# Patient Record
Sex: FEMALE | Race: WHITE | Employment: OTHER | URBAN - METROPOLITAN AREA
[De-identification: names, ages, dates, MRNs, and addresses within clinical notes are randomized per-mention and may not be internally consistent; named-entity substitution may affect disease eponyms.]

---

## 2017-02-27 ENCOUNTER — OFFICE VISIT (OUTPATIENT)
Dept: FAMILY MEDICINE CLINIC | Age: 57
End: 2017-02-27

## 2017-02-27 VITALS
HEART RATE: 74 BPM | BODY MASS INDEX: 32.24 KG/M2 | DIASTOLIC BLOOD PRESSURE: 85 MMHG | SYSTOLIC BLOOD PRESSURE: 138 MMHG | RESPIRATION RATE: 14 BRPM | HEIGHT: 62 IN | TEMPERATURE: 98.1 F | WEIGHT: 175.2 LBS

## 2017-02-27 DIAGNOSIS — E11.9 TYPE 2 DIABETES MELLITUS WITHOUT COMPLICATION, WITHOUT LONG-TERM CURRENT USE OF INSULIN (HCC): Primary | ICD-10-CM

## 2017-02-27 DIAGNOSIS — F17.208 NICOTINE DEPENDENCE WITH OTHER NICOTINE-INDUCED DISORDER, UNSPECIFIED NICOTINE PRODUCT TYPE: ICD-10-CM

## 2017-02-27 DIAGNOSIS — Z71.6 ENCOUNTER FOR SMOKING CESSATION COUNSELING: ICD-10-CM

## 2017-02-27 DIAGNOSIS — R11.0 NAUSEA: ICD-10-CM

## 2017-02-27 DIAGNOSIS — J44.9 COPD, SEVERE (HCC): ICD-10-CM

## 2017-02-27 LAB — HBA1C MFR BLD: 5.7 %

## 2017-02-27 PROCEDURE — 83036 HEMOGLOBIN GLYCOSYLATED A1C: CPT | Performed by: NURSE PRACTITIONER

## 2017-02-27 PROCEDURE — 99214 OFFICE O/P EST MOD 30 MIN: CPT | Performed by: NURSE PRACTITIONER

## 2017-02-27 RX ORDER — GABAPENTIN 300 MG/1
300 CAPSULE ORAL NIGHTLY PRN
Qty: 90 CAPSULE | Refills: 0 | Status: SHIPPED | OUTPATIENT
Start: 2017-02-27 | End: 2017-05-31 | Stop reason: SDUPTHER

## 2017-02-27 RX ORDER — ONDANSETRON 4 MG/1
4 TABLET, ORALLY DISINTEGRATING ORAL EVERY 8 HOURS PRN
Qty: 90 TABLET | Refills: 1 | Status: SHIPPED | OUTPATIENT
Start: 2017-02-27 | End: 2018-02-06 | Stop reason: SDUPTHER

## 2017-02-27 RX ORDER — VARENICLINE TARTRATE 25 MG
KIT ORAL
Qty: 53 TABLET | Refills: 0 | Status: SHIPPED | OUTPATIENT
Start: 2017-02-27 | End: 2017-05-25

## 2017-02-27 ASSESSMENT — ENCOUNTER SYMPTOMS
ABDOMINAL DISTENTION: 0
VOMITING: 0
TROUBLE SWALLOWING: 0
EYE DISCHARGE: 0
WHEEZING: 1
COUGH: 1
PHOTOPHOBIA: 0
DIARRHEA: 0
CONSTIPATION: 0
NAUSEA: 0
EYE PAIN: 0
ABDOMINAL PAIN: 0
SORE THROAT: 0
BLOOD IN STOOL: 0
SHORTNESS OF BREATH: 1
BACK PAIN: 1

## 2017-03-07 ENCOUNTER — OFFICE VISIT (OUTPATIENT)
Dept: CARDIOLOGY | Age: 57
End: 2017-03-07

## 2017-03-07 VITALS
DIASTOLIC BLOOD PRESSURE: 64 MMHG | BODY MASS INDEX: 32.02 KG/M2 | WEIGHT: 174 LBS | HEART RATE: 72 BPM | HEIGHT: 62 IN | SYSTOLIC BLOOD PRESSURE: 124 MMHG

## 2017-03-07 DIAGNOSIS — I10 ESSENTIAL HYPERTENSION: ICD-10-CM

## 2017-03-07 DIAGNOSIS — E78.00 PURE HYPERCHOLESTEROLEMIA: ICD-10-CM

## 2017-03-07 DIAGNOSIS — Z01.810 PREOP CARDIOVASCULAR EXAM: Primary | ICD-10-CM

## 2017-03-07 PROCEDURE — 99213 OFFICE O/P EST LOW 20 MIN: CPT | Performed by: INTERNAL MEDICINE

## 2017-03-14 ENCOUNTER — TELEPHONE (OUTPATIENT)
Dept: FAMILY MEDICINE CLINIC | Age: 57
End: 2017-03-14

## 2017-03-15 ENCOUNTER — OFFICE VISIT (OUTPATIENT)
Dept: FAMILY MEDICINE CLINIC | Age: 57
End: 2017-03-15

## 2017-03-15 VITALS
DIASTOLIC BLOOD PRESSURE: 78 MMHG | HEART RATE: 73 BPM | TEMPERATURE: 97.8 F | BODY MASS INDEX: 30.62 KG/M2 | WEIGHT: 172.8 LBS | SYSTOLIC BLOOD PRESSURE: 132 MMHG | HEIGHT: 63 IN | RESPIRATION RATE: 12 BRPM

## 2017-03-15 DIAGNOSIS — K57.92 DIVERTICULITIS OF INTESTINE WITHOUT PERFORATION OR ABSCESS WITHOUT BLEEDING, UNSPECIFIED PART OF INTESTINAL TRACT: Primary | ICD-10-CM

## 2017-03-15 PROCEDURE — 99214 OFFICE O/P EST MOD 30 MIN: CPT | Performed by: NURSE PRACTITIONER

## 2017-03-15 RX ORDER — FLUCONAZOLE 200 MG/1
TABLET ORAL
Qty: 2 TABLET | Refills: 0 | Status: SHIPPED | OUTPATIENT
Start: 2017-03-15 | End: 2017-05-25

## 2017-03-15 RX ORDER — METRONIDAZOLE 500 MG/1
500 TABLET ORAL 3 TIMES DAILY
Qty: 30 TABLET | Refills: 0 | Status: SHIPPED | OUTPATIENT
Start: 2017-03-15 | End: 2017-03-25

## 2017-03-15 RX ORDER — CIPROFLOXACIN 500 MG/1
500 TABLET, FILM COATED ORAL 2 TIMES DAILY
Qty: 20 TABLET | Refills: 0 | Status: SHIPPED | OUTPATIENT
Start: 2017-03-15 | End: 2017-03-25

## 2017-03-15 ASSESSMENT — ENCOUNTER SYMPTOMS
EYE PAIN: 0
COUGH: 0
CONSTIPATION: 0
ABDOMINAL DISTENTION: 0
ABDOMINAL PAIN: 1
DIARRHEA: 1
SORE THROAT: 0
RECTAL PAIN: 0
VOMITING: 0
EYE DISCHARGE: 0
SHORTNESS OF BREATH: 0
TROUBLE SWALLOWING: 0
NAUSEA: 0
BLOOD IN STOOL: 0
PHOTOPHOBIA: 0

## 2017-04-07 ENCOUNTER — OFFICE VISIT (OUTPATIENT)
Dept: ENDOCRINOLOGY | Age: 57
End: 2017-04-07

## 2017-04-07 VITALS
WEIGHT: 175 LBS | BODY MASS INDEX: 31.01 KG/M2 | SYSTOLIC BLOOD PRESSURE: 123 MMHG | DIASTOLIC BLOOD PRESSURE: 74 MMHG | HEART RATE: 86 BPM | HEIGHT: 63 IN | RESPIRATION RATE: 22 BRPM

## 2017-04-07 DIAGNOSIS — E78.00 PURE HYPERCHOLESTEROLEMIA: ICD-10-CM

## 2017-04-07 DIAGNOSIS — E11.9 TYPE 2 DIABETES MELLITUS WITHOUT COMPLICATION, WITHOUT LONG-TERM CURRENT USE OF INSULIN (HCC): ICD-10-CM

## 2017-04-07 DIAGNOSIS — Z86.39 H/O HYPERTHYROIDISM: Primary | ICD-10-CM

## 2017-04-07 DIAGNOSIS — R10.84 GENERALIZED ABDOMINAL PAIN: ICD-10-CM

## 2017-04-07 PROCEDURE — 99214 OFFICE O/P EST MOD 30 MIN: CPT | Performed by: INTERNAL MEDICINE

## 2017-04-12 LAB
ALBUMIN SERPL-MCNC: 4.5 G/DL (ref 3.2–5.3)
ALK PHOSPHATASE: 128 IU/L (ref 35–121)
ALT SERPL-CCNC: 21 IU/L (ref 5–59)
AMYLASE: 34 IU/L (ref 35–120)
AST SERPL-CCNC: 18 IU/L (ref 10–42)
BILIRUB SERPL-MCNC: 0.5 MG/DL (ref 0.2–1.3)
BILIRUBIN DIRECT: 0.1 MG/DL (ref 0–0.2)
CHOLESTEROL/HDL RATIO: 3.8
CHOLESTEROL: 218 MG/DL
CREATINE, URINE: 90 MG/DL
HDLC SERPL-MCNC: 58 MG/DL (ref 40–60)
LDL CHOLESTEROL CALCULATED: 130 MG/DL
LDL/HDL RATIO: 2.2
LIPASE: 24 IU/L (ref 11–82)
MICROALBUMIN/CREAT 24H UR: 1.1 MG/DL
MICROALBUMIN/CREAT UR-RTO: 12 UG/MG
T4 FREE: 0.95 NG/DL (ref 0.8–1.8)
TOTAL PROTEIN: 6.9 G/DL (ref 5.8–8)
TRIGL SERPL-MCNC: 148 MG/DL
TSH SERPL DL<=0.05 MIU/L-ACNC: 2.89 UIU/ML (ref 0.4–4.4)
VLDLC SERPL CALC-MCNC: 30 MG/DL

## 2017-05-10 ENCOUNTER — TELEPHONE (OUTPATIENT)
Dept: CARDIOLOGY | Age: 57
End: 2017-05-10

## 2017-05-11 DIAGNOSIS — R06.02 SHORTNESS OF BREATH: ICD-10-CM

## 2017-05-11 RX ORDER — ALBUTEROL SULFATE 90 UG/1
2 AEROSOL, METERED RESPIRATORY (INHALATION) EVERY 6 HOURS PRN
Qty: 1 INHALER | Refills: 3 | Status: SHIPPED | OUTPATIENT
Start: 2017-05-11 | End: 2017-05-25 | Stop reason: SDUPTHER

## 2017-05-25 ENCOUNTER — OFFICE VISIT (OUTPATIENT)
Dept: PULMONOLOGY | Age: 57
End: 2017-05-25

## 2017-05-25 VITALS
HEART RATE: 83 BPM | OXYGEN SATURATION: 94 % | DIASTOLIC BLOOD PRESSURE: 78 MMHG | TEMPERATURE: 98.3 F | WEIGHT: 170.6 LBS | SYSTOLIC BLOOD PRESSURE: 117 MMHG | HEIGHT: 62 IN | BODY MASS INDEX: 31.39 KG/M2

## 2017-05-25 DIAGNOSIS — R06.02 SHORTNESS OF BREATH: ICD-10-CM

## 2017-05-25 PROCEDURE — 99213 OFFICE O/P EST LOW 20 MIN: CPT | Performed by: INTERNAL MEDICINE

## 2017-05-25 RX ORDER — ALBUTEROL SULFATE 90 UG/1
2 AEROSOL, METERED RESPIRATORY (INHALATION) EVERY 6 HOURS PRN
Qty: 1 INHALER | Refills: 11 | Status: SHIPPED | OUTPATIENT
Start: 2017-05-25 | End: 2018-02-06 | Stop reason: SDUPTHER

## 2017-05-31 ENCOUNTER — OFFICE VISIT (OUTPATIENT)
Dept: FAMILY MEDICINE CLINIC | Age: 57
End: 2017-05-31

## 2017-05-31 VITALS
HEART RATE: 84 BPM | SYSTOLIC BLOOD PRESSURE: 118 MMHG | TEMPERATURE: 97.9 F | WEIGHT: 168.5 LBS | DIASTOLIC BLOOD PRESSURE: 76 MMHG | HEIGHT: 63 IN | BODY MASS INDEX: 29.86 KG/M2 | RESPIRATION RATE: 10 BRPM

## 2017-05-31 DIAGNOSIS — I10 ESSENTIAL HYPERTENSION: ICD-10-CM

## 2017-05-31 DIAGNOSIS — F32.1 MODERATE SINGLE CURRENT EPISODE OF MAJOR DEPRESSIVE DISORDER (HCC): ICD-10-CM

## 2017-05-31 DIAGNOSIS — E11.9 WELL CONTROLLED TYPE 2 DIABETES MELLITUS (HCC): Primary | ICD-10-CM

## 2017-05-31 DIAGNOSIS — E78.00 HYPERCHOLESTEROLEMIA: ICD-10-CM

## 2017-05-31 LAB — HBA1C MFR BLD: 5.9 %

## 2017-05-31 PROCEDURE — 83036 HEMOGLOBIN GLYCOSYLATED A1C: CPT | Performed by: FAMILY MEDICINE

## 2017-05-31 PROCEDURE — 99214 OFFICE O/P EST MOD 30 MIN: CPT | Performed by: FAMILY MEDICINE

## 2017-05-31 RX ORDER — AMLODIPINE BESYLATE 10 MG/1
10 TABLET ORAL DAILY
Qty: 30 TABLET | Refills: 5 | Status: SHIPPED | OUTPATIENT
Start: 2017-05-31 | End: 2017-12-13 | Stop reason: SDUPTHER

## 2017-05-31 RX ORDER — DULOXETIN HYDROCHLORIDE 30 MG/1
CAPSULE, DELAYED RELEASE ORAL
Qty: 90 CAPSULE | Refills: 5 | Status: SHIPPED | OUTPATIENT
Start: 2017-05-31 | End: 2018-02-06 | Stop reason: SDUPTHER

## 2017-05-31 RX ORDER — DULOXETIN HYDROCHLORIDE 60 MG/1
60 CAPSULE, DELAYED RELEASE ORAL DAILY
Qty: 30 CAPSULE | Refills: 5 | Status: CANCELLED | OUTPATIENT
Start: 2017-05-31

## 2017-05-31 RX ORDER — ARIPIPRAZOLE 2 MG/1
2 TABLET ORAL DAILY
Qty: 30 TABLET | Refills: 5 | Status: SHIPPED | OUTPATIENT
Start: 2017-05-31 | End: 2018-02-09 | Stop reason: SDUPTHER

## 2017-05-31 RX ORDER — CARVEDILOL 25 MG/1
25 TABLET ORAL 2 TIMES DAILY WITH MEALS
Qty: 60 TABLET | Refills: 5 | Status: SHIPPED | OUTPATIENT
Start: 2017-05-31 | End: 2018-02-06 | Stop reason: SDUPTHER

## 2017-05-31 RX ORDER — GLUCOSAMINE HCL/CHONDROITIN SU 500-400 MG
CAPSULE ORAL
Qty: 60 STRIP | Refills: 5 | Status: SHIPPED | OUTPATIENT
Start: 2017-05-31 | End: 2018-08-10 | Stop reason: CLARIF

## 2017-05-31 RX ORDER — GABAPENTIN 300 MG/1
300 CAPSULE ORAL NIGHTLY PRN
Qty: 90 CAPSULE | Refills: 5 | Status: SHIPPED | OUTPATIENT
Start: 2017-05-31 | End: 2018-02-06 | Stop reason: SDUPTHER

## 2017-05-31 RX ORDER — UBIDECARENONE 200 MG
1 CAPSULE ORAL DAILY
Qty: 30 CAPSULE | Refills: 5 | Status: SHIPPED | OUTPATIENT
Start: 2017-05-31 | End: 2017-07-11 | Stop reason: ALTCHOICE

## 2017-05-31 RX ORDER — LANCETS 30 GAUGE
EACH MISCELLANEOUS
Qty: 100 EACH | Refills: 5 | Status: SHIPPED | OUTPATIENT
Start: 2017-05-31 | End: 2018-08-10 | Stop reason: CLARIF

## 2017-05-31 ASSESSMENT — PATIENT HEALTH QUESTIONNAIRE - PHQ9
SUM OF ALL RESPONSES TO PHQ QUESTIONS 1-9: 1
1. LITTLE INTEREST OR PLEASURE IN DOING THINGS: 0
2. FEELING DOWN, DEPRESSED OR HOPELESS: 1
SUM OF ALL RESPONSES TO PHQ9 QUESTIONS 1 & 2: 1

## 2017-07-11 ENCOUNTER — OFFICE VISIT (OUTPATIENT)
Dept: CARDIOLOGY | Age: 57
End: 2017-07-11

## 2017-07-11 VITALS
HEIGHT: 62 IN | DIASTOLIC BLOOD PRESSURE: 68 MMHG | SYSTOLIC BLOOD PRESSURE: 132 MMHG | WEIGHT: 171.6 LBS | HEART RATE: 88 BPM | BODY MASS INDEX: 31.58 KG/M2

## 2017-07-11 DIAGNOSIS — R07.82 INTERCOSTAL PAIN: Primary | ICD-10-CM

## 2017-07-11 PROCEDURE — 99213 OFFICE O/P EST LOW 20 MIN: CPT | Performed by: INTERNAL MEDICINE

## 2017-07-24 ENCOUNTER — HOSPITAL ENCOUNTER (OUTPATIENT)
Dept: NON INVASIVE DIAGNOSTICS | Age: 57
Discharge: HOME | End: 2017-07-24
Payer: COMMERCIAL

## 2017-07-24 DIAGNOSIS — I10 ESSENTIAL HYPERTENSION: ICD-10-CM

## 2017-07-24 DIAGNOSIS — R06.02 SHORTNESS OF BREATH: ICD-10-CM

## 2017-07-24 DIAGNOSIS — E78.00 HYPERCHOLESTEROLEMIA: ICD-10-CM

## 2017-07-24 DIAGNOSIS — R07.82 INTERCOSTAL PAIN: ICD-10-CM

## 2017-07-24 DIAGNOSIS — Z01.818 PRE-OP EVALUATION: ICD-10-CM

## 2017-07-24 LAB
LV EF: 65 %
LV EF: 65 %
LVEF MODALITY: NORMAL
LVEF MODALITY: NORMAL

## 2017-07-24 PROCEDURE — 93017 CV STRESS TEST TRACING ONLY: CPT | Performed by: INTERNAL MEDICINE

## 2017-07-24 PROCEDURE — 93306 TTE W/DOPPLER COMPLETE: CPT

## 2017-07-24 PROCEDURE — 6360000002 HC RX W HCPCS

## 2017-07-24 PROCEDURE — A9500 TC99M SESTAMIBI: HCPCS | Performed by: INTERNAL MEDICINE

## 2017-07-24 PROCEDURE — 78452 HT MUSCLE IMAGE SPECT MULT: CPT

## 2017-07-24 PROCEDURE — 3430000000 HC RX DIAGNOSTIC RADIOPHARMACEUTICAL: Performed by: INTERNAL MEDICINE

## 2017-07-24 RX ADMIN — Medication 34.1 MILLICURIE: at 12:45

## 2017-07-24 RX ADMIN — Medication 9.4 MILLICURIE: at 11:50

## 2017-07-31 ENCOUNTER — OFFICE VISIT (OUTPATIENT)
Dept: FAMILY MEDICINE CLINIC | Age: 57
End: 2017-07-31
Payer: COMMERCIAL

## 2017-07-31 VITALS
HEART RATE: 76 BPM | BODY MASS INDEX: 31.65 KG/M2 | DIASTOLIC BLOOD PRESSURE: 76 MMHG | TEMPERATURE: 98.9 F | HEIGHT: 62 IN | SYSTOLIC BLOOD PRESSURE: 130 MMHG | RESPIRATION RATE: 16 BRPM | WEIGHT: 172 LBS

## 2017-07-31 DIAGNOSIS — Z00.00 WELL ADULT EXAM: Primary | ICD-10-CM

## 2017-07-31 PROCEDURE — 99396 PREV VISIT EST AGE 40-64: CPT | Performed by: FAMILY MEDICINE

## 2017-07-31 ASSESSMENT — ENCOUNTER SYMPTOMS
WHEEZING: 0
SORE THROAT: 0
CHOKING: 0
APNEA: 0
FACIAL SWELLING: 0
ANAL BLEEDING: 0
COUGH: 0
NAUSEA: 0
RHINORRHEA: 0
ABDOMINAL DISTENTION: 0
EYE ITCHING: 0
EYE DISCHARGE: 0
BLOOD IN STOOL: 0
EYE REDNESS: 0
TROUBLE SWALLOWING: 0
CHEST TIGHTNESS: 0
PHOTOPHOBIA: 0
CONSTIPATION: 0
VOMITING: 0
DIARRHEA: 0
ABDOMINAL PAIN: 0
STRIDOR: 0
SINUS PRESSURE: 0
SHORTNESS OF BREATH: 0
BACK PAIN: 0
COLOR CHANGE: 0
EYE PAIN: 0
RECTAL PAIN: 0
VOICE CHANGE: 0

## 2017-08-03 ENCOUNTER — OFFICE VISIT (OUTPATIENT)
Dept: ENDOCRINOLOGY | Age: 57
End: 2017-08-03
Payer: COMMERCIAL

## 2017-08-03 VITALS
WEIGHT: 175.5 LBS | SYSTOLIC BLOOD PRESSURE: 121 MMHG | RESPIRATION RATE: 18 BRPM | DIASTOLIC BLOOD PRESSURE: 78 MMHG | HEART RATE: 83 BPM | HEIGHT: 62 IN | BODY MASS INDEX: 32.3 KG/M2

## 2017-08-03 DIAGNOSIS — I10 ESSENTIAL HYPERTENSION: ICD-10-CM

## 2017-08-03 DIAGNOSIS — E78.00 PURE HYPERCHOLESTEROLEMIA: ICD-10-CM

## 2017-08-03 DIAGNOSIS — R53.83 FATIGUE, UNSPECIFIED TYPE: ICD-10-CM

## 2017-08-03 DIAGNOSIS — E11.9 TYPE 2 DIABETES MELLITUS WITHOUT COMPLICATION, WITHOUT LONG-TERM CURRENT USE OF INSULIN (HCC): Primary | ICD-10-CM

## 2017-08-03 PROCEDURE — 99214 OFFICE O/P EST MOD 30 MIN: CPT | Performed by: INTERNAL MEDICINE

## 2017-08-03 RX ORDER — EZETIMIBE 10 MG/1
10 TABLET ORAL DAILY
Qty: 30 TABLET | Refills: 3 | Status: SHIPPED | OUTPATIENT
Start: 2017-08-03 | End: 2018-08-10 | Stop reason: CLARIF

## 2017-08-04 LAB
T4 FREE: 1.1 NG/DL (ref 0.8–1.8)
TSH SERPL DL<=0.05 MIU/L-ACNC: 0.82 UIU/ML (ref 0.4–4.4)

## 2017-08-07 ENCOUNTER — TELEPHONE (OUTPATIENT)
Dept: ENDOCRINOLOGY | Age: 57
End: 2017-08-07

## 2017-08-07 RX ORDER — ROSUVASTATIN CALCIUM 10 MG/1
TABLET, COATED ORAL
Qty: 30 TABLET | Refills: 5 | Status: SHIPPED | OUTPATIENT
Start: 2017-08-07 | End: 2018-01-31

## 2017-08-07 RX ORDER — DULOXETIN HYDROCHLORIDE 60 MG/1
CAPSULE, DELAYED RELEASE ORAL
Qty: 30 CAPSULE | Refills: 5 | Status: SHIPPED | OUTPATIENT
Start: 2017-08-07 | End: 2018-01-31

## 2017-09-07 ENCOUNTER — TELEPHONE (OUTPATIENT)
Dept: FAMILY MEDICINE CLINIC | Age: 57
End: 2017-09-07

## 2017-10-04 LAB
ALBUMIN SERPL-MCNC: 4.4 G/DL (ref 3.2–5.3)
ALK PHOSPHATASE: 121 IU/L (ref 35–121)
ALT SERPL-CCNC: 17 IU/L (ref 5–59)
AST SERPL-CCNC: 16 IU/L (ref 10–42)
BILIRUB SERPL-MCNC: 0.5 MG/DL (ref 0.2–1.3)
BILIRUBIN DIRECT: 0.1 MG/DL (ref 0–0.2)
CHOLESTEROL/HDL RATIO: 3.9
CHOLESTEROL: 205 MG/DL
HDLC SERPL-MCNC: 52 MG/DL (ref 40–60)
LDL CHOLESTEROL CALCULATED: 115 MG/DL
LDL/HDL RATIO: 2.2
TOTAL PROTEIN: 6.8 G/DL (ref 5.8–8)
TRIGL SERPL-MCNC: 191 MG/DL
VLDLC SERPL CALC-MCNC: 38 MG/DL

## 2017-10-05 ENCOUNTER — HOSPITAL ENCOUNTER (OUTPATIENT)
Dept: CT IMAGING | Age: 57
Discharge: HOME | End: 2017-10-05
Payer: COMMERCIAL

## 2017-10-05 DIAGNOSIS — R10.32 LLQ PAIN: ICD-10-CM

## 2017-10-05 DIAGNOSIS — K57.93 DIVERTICULITIS OF INTESTINE WITHOUT PERFORATION OR ABSCESS WITH BLEEDING, UNSPECIFIED PART OF INTESTINAL TRACT: ICD-10-CM

## 2017-10-05 LAB — POC CREATININE WHOLE BLOOD: 0.7 MG/DL (ref 0.5–1.2)

## 2017-10-05 PROCEDURE — 6360000004 HC RX CONTRAST MEDICATION: Performed by: INTERNAL MEDICINE

## 2017-10-05 PROCEDURE — 82565 ASSAY OF CREATININE: CPT

## 2017-10-05 PROCEDURE — 74177 CT ABD & PELVIS W/CONTRAST: CPT

## 2017-10-05 RX ADMIN — IOPAMIDOL 85 ML: 755 INJECTION, SOLUTION INTRAVENOUS at 10:50

## 2017-10-05 RX ADMIN — IOHEXOL 50 ML: 240 INJECTION, SOLUTION INTRATHECAL; INTRAVASCULAR; INTRAVENOUS; ORAL at 10:00

## 2017-10-25 ENCOUNTER — TELEPHONE (OUTPATIENT)
Dept: FAMILY MEDICINE CLINIC | Age: 57
End: 2017-10-25

## 2017-10-25 LAB — C DIFFICILE TOXIN, EIA: NORMAL

## 2017-11-07 ENCOUNTER — OFFICE VISIT (OUTPATIENT)
Dept: ENDOCRINOLOGY | Age: 57
End: 2017-11-07
Payer: MEDICARE

## 2017-11-07 ENCOUNTER — TELEPHONE (OUTPATIENT)
Dept: FAMILY MEDICINE CLINIC | Age: 57
End: 2017-11-07

## 2017-11-07 VITALS
DIASTOLIC BLOOD PRESSURE: 100 MMHG | WEIGHT: 178.5 LBS | SYSTOLIC BLOOD PRESSURE: 152 MMHG | BODY MASS INDEX: 32.85 KG/M2 | RESPIRATION RATE: 16 BRPM | HEART RATE: 80 BPM | HEIGHT: 62 IN

## 2017-11-07 DIAGNOSIS — E11.9 TYPE 2 DIABETES MELLITUS WITHOUT COMPLICATION, WITHOUT LONG-TERM CURRENT USE OF INSULIN (HCC): Primary | ICD-10-CM

## 2017-11-07 DIAGNOSIS — E78.2 MIXED HYPERLIPIDEMIA: ICD-10-CM

## 2017-11-07 DIAGNOSIS — E05.00 GRAVES DISEASE: ICD-10-CM

## 2017-11-07 PROCEDURE — G8417 CALC BMI ABV UP PARAM F/U: HCPCS | Performed by: CLINICAL NURSE SPECIALIST

## 2017-11-07 PROCEDURE — 3044F HG A1C LEVEL LT 7.0%: CPT | Performed by: CLINICAL NURSE SPECIALIST

## 2017-11-07 PROCEDURE — G8484 FLU IMMUNIZE NO ADMIN: HCPCS | Performed by: CLINICAL NURSE SPECIALIST

## 2017-11-07 PROCEDURE — G8427 DOCREV CUR MEDS BY ELIG CLIN: HCPCS | Performed by: CLINICAL NURSE SPECIALIST

## 2017-11-07 PROCEDURE — 3017F COLORECTAL CA SCREEN DOC REV: CPT | Performed by: CLINICAL NURSE SPECIALIST

## 2017-11-07 PROCEDURE — 3014F SCREEN MAMMO DOC REV: CPT | Performed by: CLINICAL NURSE SPECIALIST

## 2017-11-07 PROCEDURE — 99214 OFFICE O/P EST MOD 30 MIN: CPT | Performed by: CLINICAL NURSE SPECIALIST

## 2017-11-07 PROCEDURE — 4004F PT TOBACCO SCREEN RCVD TLK: CPT | Performed by: CLINICAL NURSE SPECIALIST

## 2017-11-07 ASSESSMENT — ENCOUNTER SYMPTOMS
BACK PAIN: 1
NAUSEA: 0
EYE REDNESS: 0
DIARRHEA: 0
WHEEZING: 0
COUGH: 0
CHEST TIGHTNESS: 0
SHORTNESS OF BREATH: 0
CONSTIPATION: 0
ABDOMINAL PAIN: 0
VOICE CHANGE: 0

## 2017-11-07 NOTE — PROGRESS NOTES
reactive to light. Right eye exhibits no discharge. Left eye exhibits no discharge. No scleral icterus. Neck: Normal range of motion. Neck supple. No thyromegaly present. Cardiovascular: Normal rate, regular rhythm, normal heart sounds and intact distal pulses. No murmur heard. Pulmonary/Chest: Effort normal and breath sounds normal. No stridor. No respiratory distress. She has no wheezes. She has no rales. Abdominal: Soft. Bowel sounds are normal. There is no tenderness. Musculoskeletal: Normal range of motion. She exhibits no edema or tenderness. Lymphadenopathy:     She has no cervical adenopathy. Neurological: She is alert and oriented to person, place, and time. No cranial nerve deficit. Coordination normal.   Skin: Skin is warm and dry. She is not diaphoretic. Psychiatric: She has a normal mood and affect. Her behavior is normal. Judgment and thought content normal.     Assessment:      BP (!) 152/100 (Site: Right Arm, Position: Sitting, Cuff Size: Medium Adult) Comment: MANUAL RECHECK  Pulse 80   Resp 16   Ht 5' 1.81\" (1.57 m)   Wt 178 lb 8 oz (81 kg)   BMI 32.85 kg/m²   1. Type 2 diabetes mellitus without complication, without long-term current use of insulin (HCC) - BS in goals & tolerating DPP-4 without adverse effects - to continue. Discussed glycemic goal, monitoring, therapeutic lifestyle for weight loss  4/2017 malb/creat 12  1/2017 BUN 11, creat 0.6, GFR > 90     2. Mixed hyperlipidemia - treated & tolerating - crestor 10 mg daily, zetai 10 mg daily - 10/2017 chol 205, trig 191, HDL 52, , liver panel normal - patient did not want to increase due to risk of muscle aches. Repeat levels on follow up     3. Graves disease - in remission 8/2017 TSH 0.8, FT4 1. 10. Will recheck thyroid function on follow up     4.  Hypertension - above goal on medication - asymptomatic - staff to contact patient's PCP - reviewed goals; advised patient to monitor at home and report to PCP    Greater than 50% of visit discussing glycemic management, diagnoses as above, reviewing/ordering labs, changing/addressing medication, answering questions    Plan:      Continue Januvia 100 mg daily  Check blood sugar once daily alternate fasting with two hours after a meal    Labs before next visit      Return in about 3 months (around 2/7/2018).        Electronically signed by FRANCISCO Valentine on 11/7/2017 at 12:45 PM

## 2017-11-09 ENCOUNTER — OFFICE VISIT (OUTPATIENT)
Dept: CARDIOLOGY CLINIC | Age: 57
End: 2017-11-09
Payer: MEDICARE

## 2017-11-09 VITALS
HEART RATE: 80 BPM | WEIGHT: 180.2 LBS | HEIGHT: 62 IN | SYSTOLIC BLOOD PRESSURE: 136 MMHG | DIASTOLIC BLOOD PRESSURE: 78 MMHG | BODY MASS INDEX: 33.16 KG/M2

## 2017-11-09 DIAGNOSIS — E78.00 PURE HYPERCHOLESTEROLEMIA: Primary | ICD-10-CM

## 2017-11-09 DIAGNOSIS — R07.82 INTERCOSTAL PAIN: ICD-10-CM

## 2017-11-09 PROCEDURE — G8427 DOCREV CUR MEDS BY ELIG CLIN: HCPCS | Performed by: INTERNAL MEDICINE

## 2017-11-09 PROCEDURE — 3014F SCREEN MAMMO DOC REV: CPT | Performed by: INTERNAL MEDICINE

## 2017-11-09 PROCEDURE — 99213 OFFICE O/P EST LOW 20 MIN: CPT | Performed by: INTERNAL MEDICINE

## 2017-11-09 PROCEDURE — G8417 CALC BMI ABV UP PARAM F/U: HCPCS | Performed by: INTERNAL MEDICINE

## 2017-11-09 PROCEDURE — 4004F PT TOBACCO SCREEN RCVD TLK: CPT | Performed by: INTERNAL MEDICINE

## 2017-11-09 PROCEDURE — G8484 FLU IMMUNIZE NO ADMIN: HCPCS | Performed by: INTERNAL MEDICINE

## 2017-11-09 PROCEDURE — 3017F COLORECTAL CA SCREEN DOC REV: CPT | Performed by: INTERNAL MEDICINE

## 2017-11-09 NOTE — PROGRESS NOTES
Devonte Do is a 56 y.o. female is here for   risk for atherosclerotic disease and has had no chest pains . Intensity of the pain none  provocation of the pain none  what relieves the pain none , where does  the pain migrates to none  and no nausea no fever and chills and no sob with and without activity.  No evidence of swelling in legs no palpitations and no syncopal episodes and no dizziness ,no bleeding ,or urination  Problems ,no double visions ,no speech problems and no bowel problems or diarrhea and tolerating medications     Patient Active Problem List   Diagnosis    Type II or unspecified type diabetes mellitus without mention of complication, not stated as uncontrolled    Graves disease    [facility]    Essential hypertension    Hyperlipidemia    Lumbar disc prolapse with compression radiculopathy    H/O hyperthyroidism    Preop cardiovascular exam    Intercostal pain     Past Medical History:   Diagnosis Date    [facility]chronic obstructive [facility] disease) [facility]     Degenerative disc disease 1996    Depression 07/2016    Diverticulitis     Graves disease 2011    Hyperlipidemia 2010    Hypertension 2011    Spinal stenosis 04/2015    Type II or unspecified type diabetes mellitus without mention of complication, not stated as uncontrolled 2014    Vitamin D deficiency 2012     Social History   Substance Use Topics    Smoking status: Current Every Day Smoker     Packs/day: 1.00     Years: 38.00     Types: Cigarettes     Start date: 5/25/1979    Smokeless tobacco: Never Used      Comment: pt smokes 22-23 cigs a day     Alcohol use No     Allergies   Allergen Reactions    Codeine      Vomiting     Current Outpatient Prescriptions   Medication Sig Dispense Refill    SITagliptin (JANUVIA) 100 MG tablet Take 1 tablet by mouth daily 30 tablet 5    DULoxetine (CYMBALTA) 60 MG extended release capsule take 1 capsule by mouth once daily 30 capsule 5  rosuvastatin (CRESTOR) 10 MG tablet take 1 tablet by mouth at bedtime 30 tablet 5    ezetimibe (ZETIA) 10 MG tablet Take 1 tablet by mouth daily 30 tablet 3    amLODIPine (NORVASC) 10 MG tablet Take 1 tablet by mouth daily 30 tablet 5    ARIPiprazole (ABILIFY) 2 MG tablet Take 1 tablet by mouth daily 30 tablet 5    carvedilol (COREG) 25 MG tablet Take 1 tablet by mouth 2 times daily (with meals) 60 tablet 5    gabapentin (NEURONTIN) 300 MG capsule Take 1 capsule by mouth nightly as needed (nerve pain) 90 capsule 5    Glucose Blood (BLOOD GLUCOSE TEST STRIPS) STRP Check Blood sugars BID 60 strip 5    Lancets MISC Check bid 100 each 5    DULoxetine (CYMBALTA) 30 MG extended release capsule Take 3 capsules PO every morning 90 capsule 5    umeclidinium-vilanterol (ANORO ELLIPTA) 62.5-25 MCG/INH AEPB inhaler Inhale 1 puff into the lungs daily 1 each 11    albuterol sulfate HFA (PROAIR HFA) 108 (90 BASE) MCG/ACT inhaler Inhale 2 puffs into the lungs every 6 hours as needed for Wheezing 1 Inhaler 11    ondansetron (ZOFRAN ODT) 4 MG disintegrating tablet Take 1 tablet by mouth every 8 hours as needed for Nausea or Vomiting 90 tablet 1    glucose monitoring kit (FREESTYLE) monitoring kit 1 kit by Does not apply route daily 1 kit 0     No current facility-administered medications for this visit. REVIEW OF SYSTEM  Constitutional  symptoms such as fever chills and malaise and weakness  Are negative   HE ENT negative  HEART all negative  LUNGS all negative   ABDOMEN all negative  GENITAL URINARY all within normal limits  NEUROLOGY all negative  NECK all negative   SKIN all negative  MUSCULOSKELETAL negative  HEMATOLOGICAL negative  PSYCHIATRIC  negative    Vitals:    11/09/17 1512   BP: 136/78   Pulse: 80   Weight: 180 lb 3.2 oz (81.7 kg)   Height: 5' 2\" (1.575 m)       EXAMINATION   Gen.  Appearance is reflective of nutritional normal nutrition and well-groomed   Appears  stated age    Carotid the amplitude of  carotid artery  And up stroke are present or diminished and bruits are absent   Thyroid palpation appears to be  Normal    MARINE  And evaluated and within normal limits  And size of pupils is normal  HEENT  teeth appears to be symmetrical without poor dentition and gums  and palate appears to be healthy and  head is normal cephalic  Without signs of trauma and eyes are without trauma no conjunctiva and Iids retracting and not retracted ptosis and no ptosis and without  erythematous changes and  Nose is symmetrical  without drainage  and ears are  without tinnitus  and throat is clear   JUGULAR VEINS  are not elevated and tongue is mid line no masses presence and no foster A waves present   LYMPHATICS are without adenopathies at least on exam   CHEST  No biventricular heaves and thrills and no right ventricular heaves and examination without signs of trauma and lesions  HEART not distant and regular rate and rhythm without   gallop signs and and no murmurs and systolic crescendo  Decrescendo  normal s1 and s2 sounds and no s3 and s4 split   Point of maximum intensity of the heartbeat  is at or displaced from the fifth intercostal space  LUNGS no   presence of intercostal retractions and no  use of the accessory muscles with a diaphragmatic movement present and not present pectus and pigeon chest and without wheezes and rhonchi and non diminished in all posterior lungs  and without rales  ABDOMEN abdominal bruit not present  And  No  Presency of  morbid obesity and with no    non distended without organomegaly and no rebound tenderness and bowel sound are present  all quadrants   NEUROLOGY all the cranial nerves are intact and no motor deficits  and no sensory deficits  MUSCULAR SKELETAL without signs of trauma and kyphosis and scoliosis and normal range of motion for all extremities  INTEGUMENTARY without tenting and skin rashes indentation and  dryness  NECK without lymph adenopathy   NEUROPSYCHIATRIC has no anxiety, no mood swings and depression  VASCULAR EXAM for carotid ,radial femoral arteries are  steady  and not diminished   Extremities no evidence of peripheral edema  And pulses are  normal    Assessment and plans  1. Pure hypercholesterolemia     2. Intercostal pain resolved       Advised patient about risk factor modifications including exercise 30 mins a day and blood pressure control and other pertinent cardiac risk factors and patient accepts to modify them. Patient was advised to return in 6 to 10 months for follow up or sooner if there is any change in care.     We Re assured  And educated the  patient  On their  medical status  And the treatment plans  And the patient  is willing  to be complaint with care  And follow up and  All their question  answered to their  Satisfaction    also we reviewed with patient about the test result and echocardiogram and stress test that are favorable and with no guarantees and if patient condition changes need to return for reevaluation     NORMAL STRESS CANNOT EXCLUDE REMOTE PROBABILITY OF ONE HAVING A HEART ATTACK / MI AND PATIENT UNDERSTANDS     Electronically signed by Sudhir Garcia DO on 11/9/2017 at 3:41 PM

## 2017-12-13 RX ORDER — AMLODIPINE BESYLATE 10 MG/1
10 TABLET ORAL DAILY
Qty: 30 TABLET | Refills: 5 | Status: SHIPPED | OUTPATIENT
Start: 2017-12-13 | End: 2018-02-06 | Stop reason: SDUPTHER

## 2017-12-13 NOTE — TELEPHONE ENCOUNTER
12/13/17   Jackson Hinkle called requesting a refill on the following medications:  Requested Prescriptions     Pending Prescriptions Disp Refills    amLODIPine (NORVASC) 10 MG tablet 30 tablet 5     Sig: Take 1 tablet by mouth daily     Pharmacy verified:  [email or web address]      Date of last visit:  7/31/17  Date of next visit (if applicable): 4/19/32  blm

## 2018-01-31 ENCOUNTER — OFFICE VISIT (OUTPATIENT)
Dept: FAMILY MEDICINE CLINIC | Age: 58
End: 2018-01-31
Payer: MEDICARE

## 2018-01-31 VITALS
WEIGHT: 181 LBS | SYSTOLIC BLOOD PRESSURE: 122 MMHG | OXYGEN SATURATION: 97 % | HEIGHT: 62 IN | DIASTOLIC BLOOD PRESSURE: 78 MMHG | HEART RATE: 80 BPM | BODY MASS INDEX: 33.31 KG/M2 | TEMPERATURE: 98.7 F

## 2018-01-31 DIAGNOSIS — F17.208 NICOTINE DEPENDENCE WITH OTHER NICOTINE-INDUCED DISORDER, UNSPECIFIED NICOTINE PRODUCT TYPE: ICD-10-CM

## 2018-01-31 DIAGNOSIS — F32.4 MAJOR DEPRESSIVE DISORDER WITH SINGLE EPISODE, IN PARTIAL REMISSION (HCC): ICD-10-CM

## 2018-01-31 DIAGNOSIS — I10 ESSENTIAL HYPERTENSION: ICD-10-CM

## 2018-01-31 DIAGNOSIS — E78.00 HYPERCHOLESTEROLEMIA: ICD-10-CM

## 2018-01-31 DIAGNOSIS — E11.9 TYPE 2 DIABETES MELLITUS WITHOUT COMPLICATION, WITHOUT LONG-TERM CURRENT USE OF INSULIN (HCC): Primary | ICD-10-CM

## 2018-01-31 LAB — HBA1C MFR BLD: 5.6 %

## 2018-01-31 PROCEDURE — 83036 HEMOGLOBIN GLYCOSYLATED A1C: CPT | Performed by: FAMILY MEDICINE

## 2018-01-31 PROCEDURE — 99214 OFFICE O/P EST MOD 30 MIN: CPT | Performed by: FAMILY MEDICINE

## 2018-01-31 RX ORDER — AMLODIPINE BESYLATE 10 MG/1
10 TABLET ORAL DAILY
Qty: 30 TABLET | Refills: 5 | Status: CANCELLED | OUTPATIENT
Start: 2018-01-31

## 2018-01-31 RX ORDER — GABAPENTIN 300 MG/1
300 CAPSULE ORAL NIGHTLY PRN
Qty: 90 CAPSULE | Refills: 5 | Status: CANCELLED | OUTPATIENT
Start: 2018-01-31

## 2018-01-31 RX ORDER — ROSUVASTATIN CALCIUM 10 MG/1
TABLET, COATED ORAL
Qty: 30 TABLET | Refills: 5 | Status: CANCELLED | OUTPATIENT
Start: 2018-01-31

## 2018-01-31 RX ORDER — DULOXETIN HYDROCHLORIDE 60 MG/1
CAPSULE, DELAYED RELEASE ORAL
Qty: 30 CAPSULE | Refills: 5 | Status: CANCELLED | OUTPATIENT
Start: 2018-01-31

## 2018-01-31 RX ORDER — CARVEDILOL 25 MG/1
25 TABLET ORAL 2 TIMES DAILY WITH MEALS
Qty: 60 TABLET | Refills: 5 | Status: CANCELLED | OUTPATIENT
Start: 2018-01-31

## 2018-01-31 RX ORDER — DULOXETIN HYDROCHLORIDE 30 MG/1
CAPSULE, DELAYED RELEASE ORAL
Qty: 90 CAPSULE | Refills: 5 | Status: CANCELLED | OUTPATIENT
Start: 2018-01-31

## 2018-01-31 RX ORDER — ARIPIPRAZOLE 2 MG/1
2 TABLET ORAL DAILY
Qty: 30 TABLET | Refills: 5 | Status: CANCELLED | OUTPATIENT
Start: 2018-01-31

## 2018-01-31 RX ORDER — GLUCOSAMINE HCL/CHONDROITIN SU 500-400 MG
CAPSULE ORAL
Qty: 60 STRIP | Refills: 5 | Status: CANCELLED | OUTPATIENT
Start: 2018-01-31

## 2018-01-31 RX ORDER — LANCETS 30 GAUGE
EACH MISCELLANEOUS
Qty: 100 EACH | Refills: 5 | Status: CANCELLED | OUTPATIENT
Start: 2018-01-31

## 2018-01-31 NOTE — PROGRESS NOTES
Chief Complaint   Patient presents with    6 Month Follow-Up    Hypertension    Hyperlipidemia    Diabetes    Discuss Medications     all medications need changed to due to [employer], will only have medicare as of Feb 18    Other     multiple bowel movements multiple times a day-has been tested for C.diff twice-negative results    Other     thumb on right hand is cold to touch x 2 weeks       HPI:    Mrs. Tana Mukherjee is a 56year old female who comes today to the office for follow-up of hypertension, diabetes mellitus type 2,  hyperlipidemia, and depresion. In the last 12 months she has had 2 episode of acute diverticulitis. Mona has been seeing Dr. Tanya Bullock. She was diagnosed with Clostridium difficile and referred to ID. She continues to have daily multiple episodes of diarrhea or loose stools, associated with accidents. Hypertension:  She is taking Amlodipine 10 mg 1 tablet PO daily and Coreg 25 mg 1 tablet PO BID. She denies any side effects from the medications. She is not exercising due to her chronic back pain. She  is adherent to low salt diet. Her blood pressure is well controlled yet. She denies claudication, dyspnea, irregular heart beat, lower extremity edema, near-syncope, orthopnea and palpitations. She has some shortness of breath with mild physical activity. She has COPD. Cardiovascular risk factors: diabetes mellitus, dyslipidemia, hypertension, obesity (BMI >= 30 kg/m2), sedentary lifestyle and smoking/ tobacco exposure. Use of agents associated with hypertension: thyroid hormones. Diabetes Mellitus: She is taking Januvia 100 mg 1 tablet PO daily and she denies side effects from the medicine. She denies foot ulcerations, hyperglycemia, paresthesia of the feet, polydipsia, polyuria, visual disturbances and weight loss. Home sugars: patient does not check sugars. HgbA1c was 5.6 today. [facility]  She is up to date on and constipation. No diarrhea or nausea/vomiting  Endocrine ROS: no polyuria, polydipsia, or increased apetite  Musculoskeletal ROS: negative for - muscle pain  Neurological ROS: negative for - dizziness. Skin ROS: no rashes    Physical Examination:     Blood pressure 122/78, pulse 80, temperature 98.7 °F (37.1 °C), temperature source Oral, height 5' 2\" (1.575 m), weight 181 lb (82.1 kg), SpO2 97 %, not currently breastfeeding. General appearance - alert, well appearing, and in no distress. HEENT - NC, AT, PERRLA, EOMI, Anicteric sclerae  Ears - normal tympanic membranes bilaterally  Nose - normal turbinates, no drainage  Throat - no erythema or lesions, moist mucosas  Neck - supple, no significant adenopathy  Chest - decreased breath sounds  Heart - normal rate, regular rhythm  Abdomen - soft, nontender, nondistended, no masses or organomegaly  Extremities - no pedal edema, no clubbing or cyanosis. Skin - normal coloration and turgor, no rashes, no suspicious skin lesions noted  Monofilament test negative or diabetic neuropathy. Assessment:    1. Type 2 diabetes mellitus without complication, without long-term current use of insulin ([facility])  POCT glycosylated hemoglobin (Hb A1C)   2. Essential hypertension     3. Hypercholesterolemia     4. Nicotine dependence with other nicotine-induced disorder, unspecified nicotine product type     5. Major depressive disorder with single episode, in partial remission ([facility])         Plan:    Continue Coreg 25 mg 1 tablet PO BID  Continue Amlodipine 10 mg 1 tablet PO daily  Continue Januvia 100 mg 1 tablet PO daily  Start  Coenzyme Q 10 100 mg PO daily  Continue Crestor 5 mg mg 1 tablet PO start twice a week and titrate up to pain  Lipid profile in 3 months  Next visit she will bring the $4 list drugs to switch to them if at all possible  We talked about the importance to quit smoking  Decrease Abilify to half dose for 4 weeks then stop.           Orders Placed This Encounter   Procedures    POCT glycosylated hemoglobin (Hb A1C)         Return in about 3 months (around 4/30/2018).     Pedro Turk MD

## 2018-02-06 ENCOUNTER — TELEPHONE (OUTPATIENT)
Dept: FAMILY MEDICINE CLINIC | Age: 58
End: 2018-02-06

## 2018-02-06 DIAGNOSIS — R06.02 SHORTNESS OF BREATH: ICD-10-CM

## 2018-02-06 DIAGNOSIS — R11.0 NAUSEA: ICD-10-CM

## 2018-02-06 DIAGNOSIS — F17.208 NICOTINE DEPENDENCE WITH OTHER NICOTINE-INDUCED DISORDER, UNSPECIFIED NICOTINE PRODUCT TYPE: ICD-10-CM

## 2018-02-06 RX ORDER — CARVEDILOL 25 MG/1
25 TABLET ORAL 2 TIMES DAILY WITH MEALS
Qty: 60 TABLET | Refills: 5 | Status: SHIPPED | OUTPATIENT
Start: 2018-02-06 | End: 2018-08-10 | Stop reason: SDUPTHER

## 2018-02-06 RX ORDER — DULOXETIN HYDROCHLORIDE 30 MG/1
CAPSULE, DELAYED RELEASE ORAL
Qty: 90 CAPSULE | Refills: 5 | Status: SHIPPED | OUTPATIENT
Start: 2018-02-06 | End: 2018-08-10 | Stop reason: SDUPTHER

## 2018-02-06 RX ORDER — AMLODIPINE BESYLATE 10 MG/1
10 TABLET ORAL DAILY
Qty: 30 TABLET | Refills: 5 | Status: SHIPPED | OUTPATIENT
Start: 2018-02-06 | End: 2018-08-10 | Stop reason: SDUPTHER

## 2018-02-06 RX ORDER — GABAPENTIN 300 MG/1
300 CAPSULE ORAL NIGHTLY PRN
Qty: 90 CAPSULE | Refills: 5 | Status: SHIPPED | OUTPATIENT
Start: 2018-02-06 | End: 2019-05-22 | Stop reason: SDUPTHER

## 2018-02-06 RX ORDER — ALBUTEROL SULFATE 90 UG/1
2 AEROSOL, METERED RESPIRATORY (INHALATION) EVERY 6 HOURS PRN
Qty: 1 INHALER | Refills: 11 | Status: SHIPPED | OUTPATIENT
Start: 2018-02-06 | End: 2019-05-22 | Stop reason: SDUPTHER

## 2018-02-06 RX ORDER — ONDANSETRON 4 MG/1
4 TABLET, ORALLY DISINTEGRATING ORAL EVERY 8 HOURS PRN
Qty: 30 TABLET | Refills: 0 | Status: SHIPPED | OUTPATIENT
Start: 2018-02-06 | End: 2018-08-10

## 2018-02-06 NOTE — TELEPHONE ENCOUNTER
Alondra Cerna called for a refill of her Zofran 4 mg, however, is not helping much and is asking if she can get a stronger mg? Please advise.     [facility] in [place]

## 2018-02-09 RX ORDER — ARIPIPRAZOLE 2 MG/1
2 TABLET ORAL DAILY
Qty: 30 TABLET | Refills: 5 | Status: SHIPPED | OUTPATIENT
Start: 2018-02-09 | End: 2018-08-10 | Stop reason: CLARIF

## 2018-02-22 LAB
ALBUMIN SERPL-MCNC: 4.8 G/DL (ref 3.5–5.2)
ALK PHOSPHATASE: 140 U/L (ref 30–111)
ALT SERPL-CCNC: 23 U/L (ref 5–40)
ANION GAP SERPL CALCULATED.3IONS-SCNC: 15 MEQ/L (ref 10–19)
AST SERPL-CCNC: 20 U/L (ref 9–40)
AVERAGE GLUCOSE: 123 MG/DL (ref 66–114)
BILIRUB SERPL-MCNC: 0.2 MG/DL
BILIRUBIN DIRECT: <0.2 MG/DL
BUN BLDV-MCNC: 10 MG/DL (ref 8–23)
CALCIUM SERPL-MCNC: 9.3 MG/DL (ref 8.5–10.5)
CHLORIDE BLD-SCNC: 100 MEQ/L (ref 95–107)
CHOLESTEROL/HDL RATIO: 4.4
CHOLESTEROL: 229 MG/DL
CO2: 26 MEQ/L (ref 19–31)
CREAT SERPL-MCNC: 0.7 MG/DL (ref 0.6–1.3)
EGFR AFRICAN AMERICAN: 113 ML/MIN/1.73 M2
EGFR IF NONAFRICAN AMERICAN: 97.5 ML/MIN/1.73 M2
GLUCOSE: 104 MG/DL (ref 70–99)
HBA1C MFR BLD: 5.9 % (ref 4.2–5.8)
HDLC SERPL-MCNC: 52 MG/DL
LDL CHOLESTEROL CALCULATED: 154 MG/DL
LDL/HDL RATIO: 3
POTASSIUM SERPL-SCNC: 4.6 MEQ/L (ref 3.5–5.4)
SODIUM BLD-SCNC: 141 MEQ/L (ref 135–146)
T4 FREE: 1.04 NG/DL (ref 0.8–1.9)
TOTAL PROTEIN: 7.6 G/DL (ref 6.1–8.3)
TRIGL SERPL-MCNC: 113 MG/DL
TSH SERPL DL<=0.05 MIU/L-ACNC: 1.82 UIU/ML (ref 0.4–4.1)
VLDLC SERPL CALC-MCNC: 23 MG/DL

## 2018-02-23 LAB — T3 FREE: 3.3 PG/ML (ref 2.2–4.2)

## 2018-04-03 ENCOUNTER — TELEPHONE (OUTPATIENT)
Dept: ENDOCRINOLOGY | Age: 58
End: 2018-04-03

## 2018-04-03 DIAGNOSIS — E05.00 GRAVES DISEASE: Primary | ICD-10-CM

## 2018-08-10 ENCOUNTER — OFFICE VISIT (OUTPATIENT)
Dept: FAMILY MEDICINE CLINIC | Age: 58
End: 2018-08-10
Payer: MEDICARE

## 2018-08-10 VITALS
HEIGHT: 62 IN | SYSTOLIC BLOOD PRESSURE: 122 MMHG | HEART RATE: 78 BPM | DIASTOLIC BLOOD PRESSURE: 77 MMHG | TEMPERATURE: 98 F | BODY MASS INDEX: 34.23 KG/M2 | WEIGHT: 186 LBS | OXYGEN SATURATION: 97 %

## 2018-08-10 DIAGNOSIS — R19.7 DIARRHEA, UNSPECIFIED TYPE: Primary | ICD-10-CM

## 2018-08-10 DIAGNOSIS — E11.9 TYPE 2 DIABETES MELLITUS WITHOUT COMPLICATION, WITHOUT LONG-TERM CURRENT USE OF INSULIN (HCC): ICD-10-CM

## 2018-08-10 DIAGNOSIS — R06.02 SHORTNESS OF BREATH: ICD-10-CM

## 2018-08-10 DIAGNOSIS — M54.2 NECK PAIN ON LEFT SIDE: ICD-10-CM

## 2018-08-10 DIAGNOSIS — R20.2 TINGLING OF LEFT UPPER EXTREMITY: ICD-10-CM

## 2018-08-10 PROCEDURE — 4004F PT TOBACCO SCREEN RCVD TLK: CPT | Performed by: NURSE PRACTITIONER

## 2018-08-10 PROCEDURE — G8427 DOCREV CUR MEDS BY ELIG CLIN: HCPCS | Performed by: NURSE PRACTITIONER

## 2018-08-10 PROCEDURE — 3017F COLORECTAL CA SCREEN DOC REV: CPT | Performed by: NURSE PRACTITIONER

## 2018-08-10 PROCEDURE — 2022F DILAT RTA XM EVC RTNOPTHY: CPT | Performed by: NURSE PRACTITIONER

## 2018-08-10 PROCEDURE — 99214 OFFICE O/P EST MOD 30 MIN: CPT | Performed by: NURSE PRACTITIONER

## 2018-08-10 PROCEDURE — G8417 CALC BMI ABV UP PARAM F/U: HCPCS | Performed by: NURSE PRACTITIONER

## 2018-08-10 PROCEDURE — 96372 THER/PROPH/DIAG INJ SC/IM: CPT | Performed by: NURSE PRACTITIONER

## 2018-08-10 PROCEDURE — 3044F HG A1C LEVEL LT 7.0%: CPT | Performed by: NURSE PRACTITIONER

## 2018-08-10 RX ORDER — AMLODIPINE BESYLATE 10 MG/1
10 TABLET ORAL DAILY
Qty: 90 TABLET | Refills: 1 | Status: SHIPPED | OUTPATIENT
Start: 2018-08-10 | End: 2019-01-14 | Stop reason: SDUPTHER

## 2018-08-10 RX ORDER — DULOXETIN HYDROCHLORIDE 30 MG/1
CAPSULE, DELAYED RELEASE ORAL
Qty: 90 CAPSULE | Refills: 5 | Status: SHIPPED | OUTPATIENT
Start: 2018-08-10 | End: 2019-01-14 | Stop reason: SDUPTHER

## 2018-08-10 RX ORDER — ONDANSETRON 4 MG/1
4 TABLET, ORALLY DISINTEGRATING ORAL EVERY 8 HOURS PRN
Qty: 20 TABLET | Refills: 0 | Status: SHIPPED | OUTPATIENT
Start: 2018-08-10 | End: 2019-01-14 | Stop reason: SDUPTHER

## 2018-08-10 RX ORDER — CARVEDILOL 25 MG/1
25 TABLET ORAL 2 TIMES DAILY WITH MEALS
Qty: 90 TABLET | Refills: 2 | Status: SHIPPED | OUTPATIENT
Start: 2018-08-10 | End: 2019-01-14 | Stop reason: SDUPTHER

## 2018-08-10 RX ORDER — KETOROLAC TROMETHAMINE 30 MG/ML
60 INJECTION, SOLUTION INTRAMUSCULAR; INTRAVENOUS ONCE
Status: COMPLETED | OUTPATIENT
Start: 2018-08-10 | End: 2018-08-10

## 2018-08-10 RX ADMIN — KETOROLAC TROMETHAMINE 60 MG: 30 INJECTION, SOLUTION INTRAMUSCULAR; INTRAVENOUS at 16:30

## 2018-08-10 NOTE — PATIENT INSTRUCTIONS
learn more about \"High-Fiber Diet: Care Instructions. \"     If you do not have an account, please click on the \"Sign Up Now\" link. Current as of: May 12, 2017  Content Version: 11.7  © 0985-0500 [employer]. Care instructions adapted under license by [facility] ([facility]). If you have questions about a medical condition or this instruction, always ask your healthcare professional. [address] any warranty or liability for your use of this information.

## 2018-08-10 NOTE — PROGRESS NOTES
left side     3. Tingling of left upper extremity     4. Type 2 diabetes mellitus without complication, without long-term current use of insulin (HCC)  SITagliptin (JANUVIA) 100 MG tablet   5. Shortness of breath         Plan:  Orders Placed This Encounter   Procedures    [facility] ([facility])     Referral Priority:   Routine     Referral Type:   Eval and Treat     Referral Reason:   Specialty Services Required     Referred to Provider:   Chelsea Delong MD     Requested Specialty:   Gastroenterology     Number of Visits Requested:   1     Orders Placed This Encounter   Medications    SITagliptin (JANUVIA) 100 MG tablet     Sig: Take 1 tablet by mouth daily     Dispense:  90 tablet     Refill:  1    amLODIPine (NORVASC) 10 MG tablet     Sig: Take 1 tablet by mouth daily     Dispense:  90 tablet     Refill:  1    carvedilol (COREG) 25 MG tablet     Sig: Take 1 tablet by mouth 2 times daily (with meals)     Dispense:  90 tablet     Refill:  2    DULoxetine (CYMBALTA) 30 MG extended release capsule     Sig: Take 3 capsules PO every morning     Dispense:  90 capsule     Refill:  5    ketorolac (TORADOL) injection 60 mg    ondansetron (ZOFRAN ODT) 4 MG disintegrating tablet     Sig: Take 1 tablet by mouth every 8 hours as needed for Nausea or Vomiting     Dispense:  20 tablet     Refill:  0       Follow Up:  Return in about 4 months (around 12/10/2018) for welless.     Jason Whittington CNP

## 2018-08-14 ENCOUNTER — OFFICE VISIT (OUTPATIENT)
Dept: PULMONOLOGY | Age: 58
End: 2018-08-14
Payer: MEDICARE

## 2018-08-14 VITALS
SYSTOLIC BLOOD PRESSURE: 120 MMHG | BODY MASS INDEX: 34.12 KG/M2 | TEMPERATURE: 97.4 F | HEIGHT: 62 IN | OXYGEN SATURATION: 92 % | DIASTOLIC BLOOD PRESSURE: 68 MMHG | HEART RATE: 81 BPM | WEIGHT: 185.4 LBS

## 2018-08-14 DIAGNOSIS — J43.8 OTHER EMPHYSEMA (HCC): ICD-10-CM

## 2018-08-14 DIAGNOSIS — J44.9 CHRONIC OBSTRUCTIVE PULMONARY DISEASE, UNSPECIFIED COPD TYPE (HCC): Primary | ICD-10-CM

## 2018-08-14 DIAGNOSIS — Z87.891 PERSONAL HISTORY OF TOBACCO USE: ICD-10-CM

## 2018-08-14 DIAGNOSIS — F17.218 CIGARETTE NICOTINE DEPENDENCE WITH OTHER NICOTINE-INDUCED DISORDER: ICD-10-CM

## 2018-08-14 PROCEDURE — G8926 SPIRO NO PERF OR DOC: HCPCS | Performed by: NURSE PRACTITIONER

## 2018-08-14 PROCEDURE — G8417 CALC BMI ABV UP PARAM F/U: HCPCS | Performed by: NURSE PRACTITIONER

## 2018-08-14 PROCEDURE — 3023F SPIROM DOC REV: CPT | Performed by: NURSE PRACTITIONER

## 2018-08-14 PROCEDURE — G8428 CUR MEDS NOT DOCUMENT: HCPCS | Performed by: NURSE PRACTITIONER

## 2018-08-14 PROCEDURE — 4004F PT TOBACCO SCREEN RCVD TLK: CPT | Performed by: NURSE PRACTITIONER

## 2018-08-14 PROCEDURE — 3017F COLORECTAL CA SCREEN DOC REV: CPT | Performed by: NURSE PRACTITIONER

## 2018-08-14 PROCEDURE — G0296 VISIT TO DETERM LDCT ELIG: HCPCS | Performed by: NURSE PRACTITIONER

## 2018-08-14 PROCEDURE — 99214 OFFICE O/P EST MOD 30 MIN: CPT | Performed by: NURSE PRACTITIONER

## 2018-08-14 NOTE — PROGRESS NOTES
with Chantix and no success with NRT. I asked her to discuss Wellbutrin with her PCP. She may need to stop Cymbalta. She was instructed to combine treatment with NRT initially if needed. Will see Suzanna Flood back in: 3 months    Electronically signed by FRANCISCO Aquino CNP on 8/14/2018 at 2:35 PM'      Low Dose CT (LDCT) Lung Screening criteria met   Age 50-69   Pack year smoking >30   Still smoking or less than 15 year since quit   No sign or symptoms of lung cancer   > 11 months since last LDCT     Risks and benefits of lung cancer screening with LDCT scans discussed:    Significance of positive screen - False-positive LDCT results often occur. 95% of all positive results do not lead to a diagnosis of cancer. Usually further imaging can resolve most false-positive results; however, some patients may require invasive procedures. Over diagnosis risk - 10% to 12% of screen-detected lung cancer cases are over diagnosed--that is, the cancer would not have been detected in the patient's lifetime without the screening. Need for follow up screens annually to continue lung cancer screening effectiveness     Risks associated with radiation from annual LDCT- Radiation exposure is about the same as for a mammogram, which is about 1/3 of the annual background radiation exposure from everyday life. Starting screening at age 54 is not likely to increase cancer risk from radiation exposure. Patients with comorbidities resulting in life expectancy of < 10 years, or that would preclude treatment of an abnormality identified on CT, should not be screened due to lack of benefit.     To obtain maximal benefit from this screening, smoking cessation and long-term abstinence from smoking is critical

## 2018-08-14 NOTE — PATIENT INSTRUCTIONS
Patient Education        Stopping Smoking: Care Instructions  Your Care Instructions  Cigarette smokers crave the nicotine in cigarettes. Giving it up is much harder than simply changing a habit. Your body has to stop craving the nicotine. It is hard to quit, but you can do it. There are many tools that people use to quit smoking. You may find that combining tools works best for you. There are several steps to quitting. First you get ready to quit. Then you get support to help you. After that, you learn new skills and behaviors to become a nonsmoker. For many people, a necessary step is getting and using medicine. Your doctor will help you set up the plan that best meets your needs. You may want to attend a smoking cessation program to help you quit smoking. When you choose a program, look for one that has proven success. Ask your doctor for ideas. You will greatly increase your chances of success if you take medicine as well as get counseling or join a cessation program.  Some of the changes you feel when you first quit tobacco are uncomfortable. Your body will miss the nicotine at first, and you may feel short-tempered and grumpy. You may have trouble sleeping or concentrating. Medicine can help you deal with these symptoms. You may struggle with changing your smoking habits and rituals. The last step is the tricky one: Be prepared for the smoking urge to continue for a time. This is a lot to deal with, but keep at it. You will feel better. Follow-up care is a key part of your treatment and safety. Be sure to make and go to all appointments, and call your doctor if you are having problems. It's also a good idea to know your test results and keep a list of the medicines you take. How can you care for yourself at home? · Ask your family, friends, and coworkers for support. You have a better chance of quitting if you have help and support.   · Join a support group, such as Nicotine Anonymous, for people who are limit your life expectancy to less than 10 years, you should not undergo lung cancer screening with LDCT. The chance is 20%-60% that the LDCT result will show abnormalities. This would require additional testing which could include repeat imaging or even invasive procedures. Most (about 95%) of \"abnormal\" LDCT results are false in the sense that no lung cancer is ultimately found. Additionally, some (about 10%) of the cancers found would not affect your life expectancy, even if undetected and untreated. If you are still smoking, the single most important thing that you can do to reduce your risk of dying of lung cancer is to quit. For this screening to be covered by Medicare and most other insurers, strict criteria must be met. If you do not meet these criteria, but still wish to undergo LDCT testing, you will be required to sign a waiver indicating your willingness to pay for the scan.

## 2018-09-10 ENCOUNTER — HOSPITAL ENCOUNTER (OUTPATIENT)
Age: 58
Discharge: HOME | End: 2018-09-10
Payer: MEDICARE

## 2018-09-10 DIAGNOSIS — Z86.19 HISTORY OF CLOSTRIDIUM DIFFICILE COLITIS: ICD-10-CM

## 2018-09-10 DIAGNOSIS — K21.9 GASTROESOPHAGEAL REFLUX DISEASE WITHOUT ESOPHAGITIS: ICD-10-CM

## 2018-09-10 DIAGNOSIS — R19.7 DIARRHEA OF PRESUMED INFECTIOUS ORIGIN: ICD-10-CM

## 2018-09-10 LAB
ERYTHROCYTE [DISTWIDTH] IN BLOOD BY AUTOMATED COUNT: 12.4 % (ref 11.5–14.5)
ERYTHROCYTE [DISTWIDTH] IN BLOOD BY AUTOMATED COUNT: 40 FL (ref 35–45)
HCT VFR BLD CALC: 46.8 % (ref 37–47)
HEMOGLOBIN: 17 GM/DL (ref 12–16)
MCH RBC QN AUTO: 32 PG (ref 26–33)
MCHC RBC AUTO-ENTMCNC: 36.3 GM/DL (ref 32.2–35.5)
MCV RBC AUTO: 88 FL (ref 81–99)
PLATELET # BLD: 304 THOU/MM3 (ref 130–400)
PMV BLD AUTO: 10.2 FL (ref 9.4–12.4)
RBC # BLD: 5.32 MILL/MM3 (ref 4.2–5.4)
WBC # BLD: 13.2 THOU/MM3 (ref 4.8–10.8)

## 2018-09-10 PROCEDURE — 36415 COLL VENOUS BLD VENIPUNCTURE: CPT

## 2018-09-10 PROCEDURE — 85027 COMPLETE CBC AUTOMATED: CPT

## 2018-09-16 ENCOUNTER — HOSPITAL ENCOUNTER (OUTPATIENT)
Age: 58
Setting detail: SPECIMEN
Discharge: HOME | End: 2018-09-16
Attending: INTERNAL MEDICINE
Payer: MEDICARE

## 2018-09-16 PROCEDURE — 87045 FECES CULTURE AEROBIC BACT: CPT

## 2018-09-16 PROCEDURE — 89055 LEUKOCYTE ASSESSMENT FECAL: CPT

## 2018-09-16 PROCEDURE — 87329 GIARDIA AG IA: CPT

## 2018-09-16 PROCEDURE — 87427 SHIGA-LIKE TOXIN AG IA: CPT

## 2018-09-16 PROCEDURE — 87425 ROTAVIRUS AG IA: CPT

## 2018-09-16 PROCEDURE — 87493 C DIFF AMPLIFIED PROBE: CPT

## 2018-09-19 DIAGNOSIS — R19.7 DIARRHEA OF PRESUMED INFECTIOUS ORIGIN: ICD-10-CM

## 2018-09-19 DIAGNOSIS — Z86.19 HISTORY OF CLOSTRIDIUM DIFFICILE COLITIS: ICD-10-CM

## 2018-09-19 DIAGNOSIS — K21.9 GASTROESOPHAGEAL REFLUX DISEASE WITHOUT ESOPHAGITIS: ICD-10-CM

## 2018-09-19 LAB
CLOSTRIDIUM DIFFICILE, PCR: POSITIVE
FECAL LEUKOCYTES: NORMAL
GIARDIA ANTIGEN STOOL: NEGATIVE
ROTAVIRUS ANTIGEN: NEGATIVE

## 2018-09-19 PROCEDURE — 87177 OVA AND PARASITES SMEARS: CPT

## 2018-09-20 LAB — OVA AND PARASITES: NORMAL

## 2018-09-22 LAB — CULTURE, STOOL: NORMAL

## 2018-11-02 ENCOUNTER — TELEPHONE (OUTPATIENT)
Dept: PULMONOLOGY | Age: 58
End: 2018-11-02

## 2019-01-14 ENCOUNTER — OFFICE VISIT (OUTPATIENT)
Dept: FAMILY MEDICINE CLINIC | Age: 59
End: 2019-01-14
Payer: MEDICARE

## 2019-01-14 VITALS
BODY MASS INDEX: 33.13 KG/M2 | WEIGHT: 180 LBS | TEMPERATURE: 98.2 F | HEIGHT: 62 IN | RESPIRATION RATE: 16 BRPM | HEART RATE: 69 BPM | DIASTOLIC BLOOD PRESSURE: 84 MMHG | SYSTOLIC BLOOD PRESSURE: 123 MMHG

## 2019-01-14 DIAGNOSIS — F32.89 OTHER DEPRESSION: ICD-10-CM

## 2019-01-14 DIAGNOSIS — E11.9 TYPE 2 DIABETES MELLITUS WITHOUT COMPLICATION, WITHOUT LONG-TERM CURRENT USE OF INSULIN (HCC): ICD-10-CM

## 2019-01-14 DIAGNOSIS — Z23 NEED FOR PROPHYLACTIC VACCINATION AND INOCULATION AGAINST INFLUENZA: ICD-10-CM

## 2019-01-14 DIAGNOSIS — Z23 NEED FOR PROPHYLACTIC VACCINATION AND INOCULATION AGAINST VARICELLA: ICD-10-CM

## 2019-01-14 DIAGNOSIS — I10 ESSENTIAL HYPERTENSION: ICD-10-CM

## 2019-01-14 DIAGNOSIS — Z00.00 ROUTINE GENERAL MEDICAL EXAMINATION AT A HEALTH CARE FACILITY: Primary | ICD-10-CM

## 2019-01-14 DIAGNOSIS — Z12.31 ENCOUNTER FOR SCREENING MAMMOGRAM FOR BREAST CANCER: ICD-10-CM

## 2019-01-14 PROCEDURE — G8417 CALC BMI ABV UP PARAM F/U: HCPCS | Performed by: NURSE PRACTITIONER

## 2019-01-14 PROCEDURE — 3046F HEMOGLOBIN A1C LEVEL >9.0%: CPT | Performed by: NURSE PRACTITIONER

## 2019-01-14 PROCEDURE — G0444 DEPRESSION SCREEN ANNUAL: HCPCS | Performed by: NURSE PRACTITIONER

## 2019-01-14 PROCEDURE — 4004F PT TOBACCO SCREEN RCVD TLK: CPT | Performed by: NURSE PRACTITIONER

## 2019-01-14 PROCEDURE — G8427 DOCREV CUR MEDS BY ELIG CLIN: HCPCS | Performed by: NURSE PRACTITIONER

## 2019-01-14 PROCEDURE — G8482 FLU IMMUNIZE ORDER/ADMIN: HCPCS | Performed by: NURSE PRACTITIONER

## 2019-01-14 PROCEDURE — 2022F DILAT RTA XM EVC RTNOPTHY: CPT | Performed by: NURSE PRACTITIONER

## 2019-01-14 PROCEDURE — 99214 OFFICE O/P EST MOD 30 MIN: CPT | Performed by: NURSE PRACTITIONER

## 2019-01-14 PROCEDURE — G0008 ADMIN INFLUENZA VIRUS VAC: HCPCS | Performed by: NURSE PRACTITIONER

## 2019-01-14 PROCEDURE — 3017F COLORECTAL CA SCREEN DOC REV: CPT | Performed by: NURSE PRACTITIONER

## 2019-01-14 PROCEDURE — 90688 IIV4 VACCINE SPLT 0.5 ML IM: CPT | Performed by: NURSE PRACTITIONER

## 2019-01-14 RX ORDER — DULOXETIN HYDROCHLORIDE 30 MG/1
CAPSULE, DELAYED RELEASE ORAL
Qty: 90 CAPSULE | Refills: 5 | Status: SHIPPED | OUTPATIENT
Start: 2019-01-14 | End: 2019-05-22 | Stop reason: SDUPTHER

## 2019-01-14 RX ORDER — AMLODIPINE BESYLATE 10 MG/1
10 TABLET ORAL DAILY
Qty: 90 TABLET | Refills: 1 | Status: SHIPPED | OUTPATIENT
Start: 2019-01-14 | End: 2019-05-22 | Stop reason: SDUPTHER

## 2019-01-14 RX ORDER — CARVEDILOL 25 MG/1
25 TABLET ORAL 2 TIMES DAILY WITH MEALS
Qty: 180 TABLET | Refills: 1 | Status: SHIPPED | OUTPATIENT
Start: 2019-01-14 | End: 2019-05-22 | Stop reason: SDUPTHER

## 2019-01-14 RX ORDER — ONDANSETRON 4 MG/1
4 TABLET, ORALLY DISINTEGRATING ORAL EVERY 8 HOURS PRN
Qty: 20 TABLET | Refills: 0 | Status: SHIPPED | OUTPATIENT
Start: 2019-01-14 | End: 2019-05-22 | Stop reason: ALTCHOICE

## 2019-01-14 ASSESSMENT — ANXIETY QUESTIONNAIRES: GAD7 TOTAL SCORE: 4

## 2019-01-14 ASSESSMENT — LIFESTYLE VARIABLES
HOW OFTEN DURING THE LAST YEAR HAVE YOU HAD A FEELING OF GUILT OR REMORSE AFTER DRINKING: 0
AUDIT TOTAL SCORE: 1
AUDIT-C TOTAL SCORE: 1
HOW OFTEN DURING THE LAST YEAR HAVE YOU BEEN UNABLE TO REMEMBER WHAT HAPPENED THE NIGHT BEFORE BECAUSE YOU HAD BEEN DRINKING: 0
HOW OFTEN DURING THE LAST YEAR HAVE YOU FAILED TO DO WHAT WAS NORMALLY EXPECTED FROM YOU BECAUSE OF DRINKING: 0
HAVE YOU OR SOMEONE ELSE BEEN INJURED AS A RESULT OF YOUR DRINKING: 0
HOW OFTEN DURING THE LAST YEAR HAVE YOU NEEDED AN ALCOHOLIC DRINK FIRST THING IN THE MORNING TO GET YOURSELF GOING AFTER A NIGHT OF HEAVY DRINKING: 0
HOW OFTEN DO YOU HAVE A DRINK CONTAINING ALCOHOL: 1
HOW OFTEN DURING THE LAST YEAR HAVE YOU FOUND THAT YOU WERE NOT ABLE TO STOP DRINKING ONCE YOU HAD STARTED: 0
HOW MANY STANDARD DRINKS CONTAINING ALCOHOL DO YOU HAVE ON A TYPICAL DAY: 0
HOW OFTEN DO YOU HAVE SIX OR MORE DRINKS ON ONE OCCASION: 0
HAS A RELATIVE, FRIEND, DOCTOR, OR ANOTHER HEALTH PROFESSIONAL EXPRESSED CONCERN ABOUT YOUR DRINKING OR SUGGESTED YOU CUT DOWN: 0

## 2019-01-14 ASSESSMENT — PATIENT HEALTH QUESTIONNAIRE - PHQ9: SUM OF ALL RESPONSES TO PHQ QUESTIONS 1-9: 10

## 2019-05-22 ENCOUNTER — OFFICE VISIT (OUTPATIENT)
Dept: FAMILY MEDICINE CLINIC | Age: 59
End: 2019-05-22
Payer: MEDICARE

## 2019-05-22 VITALS
BODY MASS INDEX: 34.08 KG/M2 | DIASTOLIC BLOOD PRESSURE: 76 MMHG | WEIGHT: 185.2 LBS | OXYGEN SATURATION: 98 % | HEART RATE: 72 BPM | TEMPERATURE: 98 F | HEIGHT: 62 IN | SYSTOLIC BLOOD PRESSURE: 126 MMHG

## 2019-05-22 DIAGNOSIS — I10 ESSENTIAL HYPERTENSION: Primary | ICD-10-CM

## 2019-05-22 DIAGNOSIS — R19.7 DIARRHEA, UNSPECIFIED TYPE: ICD-10-CM

## 2019-05-22 DIAGNOSIS — Z86.19 HX OF CLOSTRIDIUM DIFFICILE INFECTION: ICD-10-CM

## 2019-05-22 DIAGNOSIS — E78.00 HYPERCHOLESTEROLEMIA: ICD-10-CM

## 2019-05-22 DIAGNOSIS — E11.9 TYPE 2 DIABETES MELLITUS WITHOUT COMPLICATION, WITHOUT LONG-TERM CURRENT USE OF INSULIN (HCC): ICD-10-CM

## 2019-05-22 DIAGNOSIS — F32.89 OTHER DEPRESSION: ICD-10-CM

## 2019-05-22 LAB — HBA1C MFR BLD: 5.9 %

## 2019-05-22 PROCEDURE — 4004F PT TOBACCO SCREEN RCVD TLK: CPT | Performed by: FAMILY MEDICINE

## 2019-05-22 PROCEDURE — 83036 HEMOGLOBIN GLYCOSYLATED A1C: CPT | Performed by: FAMILY MEDICINE

## 2019-05-22 PROCEDURE — 3044F HG A1C LEVEL LT 7.0%: CPT | Performed by: FAMILY MEDICINE

## 2019-05-22 PROCEDURE — G8427 DOCREV CUR MEDS BY ELIG CLIN: HCPCS | Performed by: FAMILY MEDICINE

## 2019-05-22 PROCEDURE — 3017F COLORECTAL CA SCREEN DOC REV: CPT | Performed by: FAMILY MEDICINE

## 2019-05-22 PROCEDURE — G8417 CALC BMI ABV UP PARAM F/U: HCPCS | Performed by: FAMILY MEDICINE

## 2019-05-22 PROCEDURE — 99214 OFFICE O/P EST MOD 30 MIN: CPT | Performed by: FAMILY MEDICINE

## 2019-05-22 PROCEDURE — 2022F DILAT RTA XM EVC RTNOPTHY: CPT | Performed by: FAMILY MEDICINE

## 2019-05-22 RX ORDER — DULOXETIN HYDROCHLORIDE 30 MG/1
CAPSULE, DELAYED RELEASE ORAL
Qty: 270 CAPSULE | Refills: 1 | Status: SHIPPED | OUTPATIENT
Start: 2019-05-22 | End: 2020-04-29

## 2019-05-22 RX ORDER — CARVEDILOL 25 MG/1
25 TABLET ORAL 2 TIMES DAILY WITH MEALS
Qty: 180 TABLET | Refills: 1 | Status: SHIPPED | OUTPATIENT
Start: 2019-05-22 | End: 2019-10-30 | Stop reason: SDUPTHER

## 2019-05-22 RX ORDER — GABAPENTIN 300 MG/1
300 CAPSULE ORAL NIGHTLY PRN
Qty: 90 CAPSULE | Refills: 1 | Status: SHIPPED | OUTPATIENT
Start: 2019-05-22 | End: 2020-04-29 | Stop reason: SDUPTHER

## 2019-05-22 RX ORDER — ALBUTEROL SULFATE 90 UG/1
2 AEROSOL, METERED RESPIRATORY (INHALATION) EVERY 6 HOURS PRN
Qty: 1 INHALER | Refills: 11 | Status: SHIPPED | OUTPATIENT
Start: 2019-05-22 | End: 2022-11-03 | Stop reason: SDUPTHER

## 2019-05-22 RX ORDER — AMLODIPINE BESYLATE 10 MG/1
10 TABLET ORAL DAILY
Qty: 90 TABLET | Refills: 1 | Status: SHIPPED | OUTPATIENT
Start: 2019-05-22 | End: 2019-10-30 | Stop reason: SDUPTHER

## 2019-05-22 NOTE — PROGRESS NOTES
[address] [facility]  [facility] [address] [facility], [address]  Ph:   [phone]  Fax: [phone]    Provider:  Dr. Pressley Shone    Patient:  Lizzie Ugalde  YOB: 1960      Visit Date:  5/22/2019     Reason For Visit:   Chief Complaint   Patient presents with    Diabetes    Back Pain     Patient would like to get an injection here today for back.  Other     Patient states that she is going to start going the alternative medicine route. Cannot afford to go to pulmonary doctor anymore either.  Medication Refill     Patient wants a medication cheaper than Januvia. Bhavana Gonzalez is a 64 y.o. female who comes today to the office for follow-up of hypertension, diabetes mellitus type 2,  hyperlipidemia, and depresion. Since I saw her last time she has been having a lot of episodes of clostridium difficile diarrhea. It started in 2018 and she saw Dr. Clifford Cuellar in first place. Then she switched to Dr. Ngoc Gyoal who did a colonoscopy and he flushed the colon with antibiotics, but the subsequent culture was still positive for C. Difficile. She was treated with 2 rounds of Vancomycin, one for 7 days and 1 for 14 days. Her symptoms got better for a while, but now they are coming back again. Hypertension:  She is taking Amlodipine 10 mg 1 tablet PO daily and Coreg 25 mg 1 tablet PO BID. She denies any side effects from the medications. She is is not exercising due to back pain. She  is adherent to low salt diet. Her blood pressure is well controlled yet. She denies claudication, dyspnea, irregular heart beat, lower extremity edema, near-syncope, orthopnea and palpitations. Cardiovascular risk factors: diabetes mellitus, dyslipidemia, hypertension, obesity (BMI >= 30 kg/m2), sedentary lifestyle and smoking/ tobacco exposure. Use of agents associated with hypertension: thyroid hormones.         Diabetes Mellitus: She is taking Januvia 100 mg 1 tablet PO daily and she denies side effects from the medicine. She denies foot ulcerations, hyperglycemia, paresthesia of the feet, polydipsia, polyuria, visual disturbances and weight loss. Home sugars: patient does not check sugars. HgbA1c was 5.9% today. She is not going to the [facility]. She is up to date on Prevnar, Influenza, 3 hepatitis  and Pneumovax. She is not up to date with his Ophthalmologist or Podiatrist visit. Hyperlipidemia:  She is not taking anything at this point. She tried Crestor 10 mg and Zetia bt she had muscle aching. There is not a family history of hyperlipidemia. There is not a family history of early ischemic heart disease. She is adherent to low cholesterol diet. Blood test done in February 2018 showed a total cholesterol 229, triglycerides 113, HDL 52 and     Depression:  She is taking Cymbalta 90 mg PO QD. She was weaned off of the Abilify. She is stable. Significant Past Medical History:    Past Medical History:   Diagnosis Date    COPD (chronic obstructive pulmonary disease) ([facility] [address]     Degenerative disc disease 1996    Depression 07/2016    Diverticulitis     Graves disease 2011    Hyperlipidemia 2010    Hypertension 2011    Spinal stenosis 04/2015    Type II or unspecified type diabetes mellitus without mention of complication, not stated as uncontrolled 2014    Vitamin D deficiency 2012           Allergies:  is allergic to codeine.     Medications:   Current Outpatient Medications   Medication Sig Dispense Refill    carvedilol (COREG) 25 MG tablet Take 1 tablet by mouth 2 times daily (with meals) 180 tablet 1    amLODIPine (NORVASC) 10 MG tablet Take 1 tablet by mouth daily 90 tablet 1    DULoxetine (CYMBALTA) 30 MG extended release capsule Take 3 capsules PO every morning 270 capsule 1    albuterol sulfate HFA (PROAIR HFA) 108 (90 Base) MCG/ACT inhaler Inhale 2 puffs into the lungs every 6 hours as needed for Wheezing 1 Inhaler 11    SITagliptin (JANUVIA) 100 MG tablet Take 1 tablet by mouth daily 90 tablet 1    gabapentin (NEURONTIN) 300 MG capsule Take 1 capsule by mouth nightly as needed (nerve pain). 90 capsule 1    umeclidinium-vilanterol (ANORO ELLIPTA) 62.5-25 MCG/INH AEPB inhaler Inhale 1 puff into the lungs daily 1 each 11     No current facility-administered medications for this visit.         Review of systems:  Review of Systems - History obtained from chart review and the patient  General ROS: negative for - chills, fatigue or fever  Psychological ROS: negative for - anxiety, depression or sleep disturbances  ENT ROS: negative for - headaches, nasal congestion or nasal discharge  Allergy and Immunology ROS: negative for - seasonal allergies  Hematological and Lymphatic ROS: negative for - bruising  Endocrine ROS: negative for - malaise/lethargy, polydipsia/polyuria or temperature intolerance  Respiratory ROS: negative for - cough,  shortness of breath or wheezing  Cardiovascular ROS: negative for - chest pain or edema  Gastrointestinal ROS: positive for -  diarrhea   Musculoskeletal ROS: negative for - joint pain or muscle pain  Neurological ROS: negative for - dizziness  Dermatological ROS: negative for - rash    Physical Examination:  /76 (Site: Left Upper Arm, Position: Sitting, Cuff Size: Medium Adult)   Pulse 72   Temp 98 °F (36.7 °C) (Oral)   Ht 5' 2\" (1.575 m)   Wt 185 lb 3.2 oz (84 kg)   SpO2 98%   BMI 33.87 kg/m²     General-  Alert and oriented x 3, NAD  HEENT: NC, AT, PERRLA, EOMI, anicteric sclerae  Ears: Normal tympanic membranes bilaterally  Nose: patent, no lesions  Mouth: no lesions, moist mucosas  Neck - supple, no significant adenopathy  Chest - clear to auscultation, no wheezes, rales or rhonchi, symmetric air entry  Heart - normal rate, regular rhythm, normal S1, S2, no murmurs, rubs, clicks or gallops  Abdomen - soft, nontender, nondistended, no masses or organomegaly  Extremities - peripheral pulses normal, no pedal edema, no clubbing or cyanosis    Impression:   Diagnosis Orders   1. Essential hypertension  Comprehensive Metabolic Panel    Lipid Panel    carvedilol (COREG) 25 MG tablet    amLODIPine (NORVASC) 10 MG tablet   2. Type 2 diabetes mellitus without complication, without long-term current use of insulin (HCC)  Microalbumin, Ur    Comprehensive Metabolic Panel    POCT glycosylated hemoglobin (Hb A1C)    SITagliptin (JANUVIA) 100 MG tablet   3. Hypercholesterolemia  TSH   4. Other depression  DULoxetine (CYMBALTA) 30 MG extended release capsule   5. Diarrhea, unspecified type     6. Hx of Clostridium difficile infection  C Diff Toxin B By Rt PCR       Plan:  Continue Norvasc 10 mg 1 PO daily  Continue Coreg 25 mg 1 PO BID  Continue Cymbalta 30 mg PO daily  Continue Januvia 100 mg PO daily      Orders Placed This Encounter   Medications    carvedilol (COREG) 25 MG tablet     Sig: Take 1 tablet by mouth 2 times daily (with meals)     Dispense:  180 tablet     Refill:  1    amLODIPine (NORVASC) 10 MG tablet     Sig: Take 1 tablet by mouth daily     Dispense:  90 tablet     Refill:  1    DULoxetine (CYMBALTA) 30 MG extended release capsule     Sig: Take 3 capsules PO every morning     Dispense:  270 capsule     Refill:  1    albuterol sulfate HFA (PROAIR HFA) 108 (90 Base) MCG/ACT inhaler     Sig: Inhale 2 puffs into the lungs every 6 hours as needed for Wheezing     Dispense:  1 Inhaler     Refill:  11    SITagliptin (JANUVIA) 100 MG tablet     Sig: Take 1 tablet by mouth daily     Dispense:  90 tablet     Refill:  1    gabapentin (NEURONTIN) 300 MG capsule     Sig: Take 1 capsule by mouth nightly as needed (nerve pain). Dispense:  90 capsule     Refill:  1       Follow Up:  Return in about 5 months (around 10/22/2019).     Chapincito Fuller MD

## 2019-05-29 LAB
ALBUMIN SERPL-MCNC: 4.3 G/DL (ref 3.2–5.3)
ALK PHOSPHATASE: 114 U/L (ref 39–130)
ALT SERPL-CCNC: 15 U/L (ref 0–31)
ANION GAP SERPL CALCULATED.3IONS-SCNC: 9 MMOL/L (ref 4–12)
AST SERPL-CCNC: 14 U/L (ref 0–41)
BILIRUB SERPL-MCNC: 0.5 MG/DL (ref 0.3–1.2)
BUN BLDV-MCNC: 10 MG/DL (ref 5–23)
CALCIUM SERPL-MCNC: 9.3 MG/DL (ref 8.5–10.5)
CHLORIDE BLD-SCNC: 106 MMOL/L (ref 98–109)
CHOLESTEROL/HDL RATIO: 5 (ref 1–5)
CHOLESTEROL: 215 MG/DL (ref 150–200)
CO2: 27 MMOL/L (ref 22–32)
CREAT SERPL-MCNC: 0.68 MG/DL (ref 0.4–1)
EGFR AFRICAN AMERICAN: >60 ML/MIN/1.73SQ.M
EGFR IF NONAFRICAN AMERICAN: >60 ML/MIN/1.73SQ.M
GLUCOSE: 99 MG/DL (ref 65–99)
HDLC SERPL-MCNC: 43 MG/DL
LDL CHOLESTEROL CALCULATED: 146 MG/DL
LDL/HDL RATIO: 3.4
POTASSIUM SERPL-SCNC: 4.2 MMOL/L (ref 3.5–5)
SODIUM BLD-SCNC: 142 MMOL/L (ref 134–146)
TOTAL PROTEIN: 6.8 G/DL (ref 6–8)
TRIGL SERPL-MCNC: 128 MG/DL (ref 27–150)
TSH SERPL DL<=0.05 MIU/L-ACNC: 1.37 UIU/ML (ref 0.49–4.67)
VLDLC SERPL CALC-MCNC: 26 MG/DL (ref 0–30)

## 2019-05-30 LAB — C DIFFICILE TOXIN, EIA: NORMAL

## 2019-06-06 ENCOUNTER — TELEPHONE (OUTPATIENT)
Dept: FAMILY MEDICINE CLINIC | Age: 59
End: 2019-06-06

## 2019-06-06 DIAGNOSIS — R19.7 DIARRHEA, UNSPECIFIED TYPE: Primary | ICD-10-CM

## 2019-06-06 DIAGNOSIS — Z86.19 HX OF CLOSTRIDIUM DIFFICILE INFECTION: ICD-10-CM

## 2019-06-06 NOTE — TELEPHONE ENCOUNTER
Called pt and advised her of results. She states that she feels she still has c-diff. She did say she has good and bad days. The metamucil helps with some formation but not completely.

## 2019-06-06 NOTE — TELEPHONE ENCOUNTER
----- Message from Brittney Arredondo MD sent at 6/5/2019  6:22 PM EDT -----  The Clostridium difficile test was not done because it can no be performed on formed stools. TSH was normal.  Lipid profile showed total cholesterol 215, triglycerides 128, HDL 43 (ideally should be more than 60), and bad cholesterol  LDL of 146.   Recommended to follow a low saturated fat diet reach on vegetables

## 2019-06-10 NOTE — TELEPHONE ENCOUNTER
Pended c diff order. Couldn't find the same order that was done last. Please advise if correct order. Called pt and advised of this. She wants order faxed to [facility] path on [facility].

## 2019-06-17 LAB — C DIFFICILE TOXIN, EIA: NORMAL

## 2019-06-18 ENCOUNTER — TELEPHONE (OUTPATIENT)
Dept: FAMILY MEDICINE CLINIC | Age: 59
End: 2019-06-18

## 2019-06-18 NOTE — TELEPHONE ENCOUNTER
----- Message from Karma Medina MD sent at 6/17/2019  5:43 PM EDT -----  Blood test within acceptable ranges

## 2019-06-18 NOTE — TELEPHONE ENCOUNTER
Pt voiced understanding of results and states she feels like she still has c-diff. Pt states she is still having slimy bowel movements all the time and does not leave the house 99% of the time because of this. I advised this patient that I would route dr Chidi Mortensen a message to be sure that she knows about this and I asked this patient are there any further questions that she has, pt stated no and states [facility] is not willing to go to the hospital for anymore testing at this time. \" Pt voiced understanding to visit ER if her symptoms continue to worsen and also to contact this office if she has any more questions or concerns. Dr Germain Camp, do you have any other recommendations for this patient?

## 2019-10-30 ENCOUNTER — OFFICE VISIT (OUTPATIENT)
Dept: FAMILY MEDICINE CLINIC | Age: 59
End: 2019-10-30
Payer: MEDICARE

## 2019-10-30 VITALS
HEIGHT: 62 IN | RESPIRATION RATE: 12 BRPM | HEART RATE: 84 BPM | BODY MASS INDEX: 33.2 KG/M2 | TEMPERATURE: 98.1 F | SYSTOLIC BLOOD PRESSURE: 114 MMHG | DIASTOLIC BLOOD PRESSURE: 77 MMHG | WEIGHT: 180.4 LBS

## 2019-10-30 DIAGNOSIS — E11.9 TYPE 2 DIABETES MELLITUS WITHOUT COMPLICATION, WITHOUT LONG-TERM CURRENT USE OF INSULIN (HCC): Primary | ICD-10-CM

## 2019-10-30 DIAGNOSIS — Z23 NEED FOR PROPHYLACTIC VACCINATION AND INOCULATION AGAINST INFLUENZA: ICD-10-CM

## 2019-10-30 DIAGNOSIS — I10 ESSENTIAL HYPERTENSION: ICD-10-CM

## 2019-10-30 DIAGNOSIS — J43.9 PULMONARY EMPHYSEMA, UNSPECIFIED EMPHYSEMA TYPE (HCC): ICD-10-CM

## 2019-10-30 DIAGNOSIS — J43.8 OTHER EMPHYSEMA (HCC): ICD-10-CM

## 2019-10-30 DIAGNOSIS — F32.89 OTHER DEPRESSION: ICD-10-CM

## 2019-10-30 DIAGNOSIS — Z87.891 PERSONAL HISTORY OF TOBACCO USE: ICD-10-CM

## 2019-10-30 LAB — HBA1C MFR BLD: 5.6 %

## 2019-10-30 PROCEDURE — G0008 ADMIN INFLUENZA VIRUS VAC: HCPCS | Performed by: NURSE PRACTITIONER

## 2019-10-30 PROCEDURE — G8417 CALC BMI ABV UP PARAM F/U: HCPCS | Performed by: NURSE PRACTITIONER

## 2019-10-30 PROCEDURE — 3017F COLORECTAL CA SCREEN DOC REV: CPT | Performed by: NURSE PRACTITIONER

## 2019-10-30 PROCEDURE — 3023F SPIROM DOC REV: CPT | Performed by: NURSE PRACTITIONER

## 2019-10-30 PROCEDURE — G8926 SPIRO NO PERF OR DOC: HCPCS | Performed by: NURSE PRACTITIONER

## 2019-10-30 PROCEDURE — G0296 VISIT TO DETERM LDCT ELIG: HCPCS | Performed by: NURSE PRACTITIONER

## 2019-10-30 PROCEDURE — 2022F DILAT RTA XM EVC RTNOPTHY: CPT | Performed by: NURSE PRACTITIONER

## 2019-10-30 PROCEDURE — 99214 OFFICE O/P EST MOD 30 MIN: CPT | Performed by: NURSE PRACTITIONER

## 2019-10-30 PROCEDURE — G8427 DOCREV CUR MEDS BY ELIG CLIN: HCPCS | Performed by: NURSE PRACTITIONER

## 2019-10-30 PROCEDURE — G8482 FLU IMMUNIZE ORDER/ADMIN: HCPCS | Performed by: NURSE PRACTITIONER

## 2019-10-30 PROCEDURE — 4004F PT TOBACCO SCREEN RCVD TLK: CPT | Performed by: NURSE PRACTITIONER

## 2019-10-30 PROCEDURE — 83036 HEMOGLOBIN GLYCOSYLATED A1C: CPT | Performed by: NURSE PRACTITIONER

## 2019-10-30 PROCEDURE — 3044F HG A1C LEVEL LT 7.0%: CPT | Performed by: NURSE PRACTITIONER

## 2019-10-30 PROCEDURE — 90688 IIV4 VACCINE SPLT 0.5 ML IM: CPT | Performed by: NURSE PRACTITIONER

## 2019-10-30 RX ORDER — BUPROPION HYDROCHLORIDE 150 MG/1
150 TABLET, EXTENDED RELEASE ORAL 2 TIMES DAILY
Qty: 180 TABLET | Refills: 1 | Status: SHIPPED | OUTPATIENT
Start: 2019-10-30 | End: 2020-04-29 | Stop reason: SDUPTHER

## 2019-10-30 RX ORDER — AMLODIPINE BESYLATE 10 MG/1
10 TABLET ORAL DAILY
Qty: 90 TABLET | Refills: 1 | Status: SHIPPED | OUTPATIENT
Start: 2019-10-30 | End: 2020-04-29 | Stop reason: SDUPTHER

## 2019-10-30 RX ORDER — CARVEDILOL 25 MG/1
25 TABLET ORAL 2 TIMES DAILY WITH MEALS
Qty: 180 TABLET | Refills: 1 | Status: SHIPPED | OUTPATIENT
Start: 2019-10-30 | End: 2020-04-29 | Stop reason: SDUPTHER

## 2020-01-15 ENCOUNTER — TELEPHONE (OUTPATIENT)
Dept: PULMONOLOGY | Age: 60
End: 2020-01-15

## 2020-01-15 NOTE — TELEPHONE ENCOUNTER
Patient called in stating she hasn't been seen since 2018 ( 8/14/18) She was suppose to have a CT lung screening and spirometry done. She was having issues at the time and was unable to complete them. She wants to have the testing done now, can you place an order for those 2 tests, and after scheduling patient will call to schedule a f/u. Please advise.

## 2020-01-28 ENCOUNTER — HOSPITAL ENCOUNTER (OUTPATIENT)
Dept: CT IMAGING | Age: 60
Discharge: HOME | End: 2020-01-28
Payer: MEDICARE

## 2020-01-28 PROCEDURE — G0297 LDCT FOR LUNG CA SCREEN: HCPCS

## 2020-04-29 ENCOUNTER — TELEPHONE (OUTPATIENT)
Dept: FAMILY MEDICINE CLINIC | Age: 60
End: 2020-04-29

## 2020-04-29 ENCOUNTER — VIRTUAL VISIT (OUTPATIENT)
Dept: FAMILY MEDICINE CLINIC | Age: 60
End: 2020-04-29
Payer: MEDICARE

## 2020-04-29 PROBLEM — F32.4 MAJOR DEPRESSIVE DISORDER WITH SINGLE EPISODE, IN PARTIAL REMISSION (HCC): Status: ACTIVE | Noted: 2020-04-29

## 2020-04-29 PROCEDURE — 99443 PR PHYS/QHP TELEPHONE EVALUATION 21-30 MIN: CPT | Performed by: NURSE PRACTITIONER

## 2020-04-29 RX ORDER — AMLODIPINE BESYLATE 10 MG/1
10 TABLET ORAL DAILY
Qty: 90 TABLET | Refills: 1 | Status: SHIPPED | OUTPATIENT
Start: 2020-04-29 | End: 2020-11-09 | Stop reason: SDUPTHER

## 2020-04-29 RX ORDER — BUPROPION HYDROCHLORIDE 150 MG/1
150 TABLET, EXTENDED RELEASE ORAL 2 TIMES DAILY
Qty: 180 TABLET | Refills: 1 | Status: SHIPPED | OUTPATIENT
Start: 2020-04-29 | End: 2020-11-09

## 2020-04-29 RX ORDER — CARVEDILOL 25 MG/1
25 TABLET ORAL 2 TIMES DAILY WITH MEALS
Qty: 180 TABLET | Refills: 1 | Status: SHIPPED | OUTPATIENT
Start: 2020-04-29 | End: 2020-11-09 | Stop reason: SDUPTHER

## 2020-04-29 RX ORDER — GABAPENTIN 300 MG/1
300 CAPSULE ORAL NIGHTLY PRN
Qty: 90 CAPSULE | Refills: 1 | Status: SHIPPED | OUTPATIENT
Start: 2020-04-29 | End: 2022-01-14 | Stop reason: SDUPTHER

## 2020-04-29 NOTE — PATIENT INSTRUCTIONS
Patient Education        Learning About COPD and Clearing Your Lungs  How does clearing your lungs affect COPD? When you have COPD, you may have too much mucus in your lungs. Learning to clear your lungs may help you save energy and improves your breathing. It may also help prevent lung infections. There are three ways to clear your lungs:  · Controlled coughing  · Postural drainage  · Chest percussion  How do you do controlled coughing? Coughing is how your body tries to get rid of mucus. But the kind of coughing you cannot control makes things worse. It causes your airways to close. It also traps the mucus in your lungs. Controlled coughing comes from deep in your lungs. It loosens mucus and moves it though your airways. It is best to do it after you use your inhaler or other medicine. 1. Sit on the edge of a chair. Keep both feet on the floor. 2. Lean forward a little. Relax. 3. Breathe in slowly through your nose. Fold your arms over your belly. 4. Lean forward as you breathe out. Push your arms against your belly. 5. Cough 2 or 3 times as you exhale with your mouth slightly open. Make the coughs short and sharp. Push on your belly with your arms as you cough. The first cough brings the mucus through the lung airways. The next coughs bring it up and out. 6. Inhale again, but do it slowly and gently through your nose. Do not take quick or deep breaths through your mouth. It can block the mucus coming out of the lungs. It also can cause uncontrolled coughing. 7. Rest, and repeat if you need to. How do you do postural drainage? Postural drainage means lying down in different positions to help drain mucus from your lungs. Hold each position for 5 minutes. Do it about 30 minutes after you use your inhaler. Make sure you have an empty stomach. If you need to cough, sit up and do controlled coughing. 1. To drain the front of your lungs: Make sure your chest is lower than your hips.  Put two pillows under your hips. Use a small pillow under your head. Keep your arms at your sides. Then follow these instructions for breathing:  ? Breathe in: With one hand on your belly and the other on your chest, breathe in. Push your belly out as far as possible. You should be able to feel the hand on your belly move out, while the hand on your chest should not move. ? Breathe out: When you breathe out, you should be able to feel the hand on your belly move in. This is called diaphragmatic breathing. You will use it in the other drainage positions too. 2. To drain the sides of your lungs: Place two or three pillows under your hips. Use a small pillow under your head. Make sure your chest is lower than your hips. Use diaphragmatic breathing. After 5 or 10 minutes, switch sides. 3. To drain the back of your lungs: Place two or three pillows under your hips. Use a small pillow under your head. Kneel over the pillows. Place your arms by your head. Use diaphragmatic breathing. How do you do chest percussion? Chest percussion means that you lightly tap your chest and back. The tapping loosens the mucus in your lungs. · Cup your hand, and lightly tap your chest and back. · Ask your doctor where the best spots are to tap. Avoid your spine and breastbone. · It may be easier to have someone do the tapping for you. Follow-up care is a key part of your treatment and safety. Be sure to make and go to all appointments, and call your doctor if you are having problems. It's also a good idea to know your test results and keep a list of the medicines you take. Where can you learn more? Go to https://Gentel Biosciencespepiceweb.DirectAdoptions.com. org and sign in to your Anametrix account. Enter I292 in the Prom[employer] Energy box to learn more about \"Learning About COPD and Clearing Your Lungs. \"     If you do not have an account, please click on the \"Sign Up Now\" link.   Current as of: June 9, 2019Content Version: 12.4  © 9053-7900 Healthwise, Incorporated. Care instructions adapted under license by [facility] [address] If you have questions about a medical condition or this instruction, always ask your healthcare professional. [place] [address] any warranty or liability for your use of this information. Patient Education        Bladder Training: Care Instructions  Your Care Instructions    Bladder training is used to treat urge incontinence and stress incontinence. Urge incontinence means that the need to urinate comes on so fast that you can't get to a toilet in time. Stress incontinence means that you leak urine because of pressure on your bladder. For example, it may happen when you laugh, cough, or lift something heavy. Bladder training can increase how long you can wait before you have to urinate. It can also help your bladder hold more urine. And it can give you better control over the urge to urinate. It is important to remember that bladder training takes a few weeks to a few months to make a difference. You may not see results right away, but don't give up. Follow-up care is a key part of your treatment and safety. Be sure to make and go to all appointments, and call your doctor if you are having problems. It's also a good idea to know your test results and keep a list of the medicines you take. How can you care for yourself at home? Work with your doctor to come up with a bladder training program that is right for you. You may use one or more of the following methods. Delayed urination  · In the beginning, try to keep from urinating for 5 minutes after you first feel the need to go. · While you wait, take deep, slow breaths to relax. Kegel exercises can also help you delay the need to go to the bathroom. · After some practice, when you can easily wait 5 minutes to urinate, try to wait 10 minutes before you urinate. · Slowly increase the waiting period until you are able to control when you have to urinate.   Scheduled urination  · Empty your bladder when you first wake up in the morning. · Schedule times throughout the day when you will urinate. · Start by going to the bathroom every hour, even if you don't need to go. · Slowly increase the time between trips to the bathroom. · When you have found a schedule that works well for you, keep doing it. · If you wake up during the night and have to urinate, do it. Apply your schedule to waking hours only. Kegel exercises  These tighten and strengthen pelvic muscles, which can help you control the flow of urine. To do Kegel exercises:  · Squeeze the same muscles you would use to stop your urine. Your belly and thighs should not move. · Hold the squeeze for 3 seconds, and then relax for 3 seconds. · Start with 3 seconds. Then add 1 second each week until you are able to squeeze for 10 seconds. · Repeat the exercise 10 to 15 times a session. Do three or more sessions a day. When should you call for help? Watch closely for changes in your health, and be sure to contact your doctor if:    · Your incontinence is getting worse.     · You do not get better as expected. Where can you learn more? Go to https://INNOBIpeAnvato.Carambola Media. org and sign in to your Adknowledge account. Enter W940 in the Massively Parallel Technologies box to learn more about \"Bladder Training: Care Instructions. \"     If you do not have an account, please click on the \"Sign Up Now\" link. Current as of: August 21, 2019Content Version: 12.4  © 7594-7921 Healthwise, Incorporated. Care instructions adapted under license by [facility] ([facility]). If you have questions about a medical condition or this instruction, always ask your healthcare professional. [place] [address] any warranty or liability for your use of this information. Patient Education        Kegel Exercises: Care Instructions  Your Care Instructions    Kegel exercises strengthen muscles around the bladder.  These muscles control the flow back.  · Avoid douches, bubble baths, and feminine hygiene sprays. And avoid other feminine hygiene products that have deodorants. When should you call for help? Call your doctor now or seek immediate medical care if:    · You have new symptoms, such as fever, nausea, or vomiting.     · You have new or worse symptoms of a urinary problem. For example:  ? You have blood or pus in your urine. ? You have chills or body aches. ? It hurts to urinate. ? You have groin or belly pain. ? You have pain in your back just below your rib cage (the flank area).    Watch closely for changes in your health, and be sure to contact your doctor if you feel thirstier than usual.  Where can you learn more? Go to https://Confluence Technologiespeleigheb.siXis. org and sign in to your Visualnet account. Enter 262 [id number] in the Sanswire box to learn more about \"Frequent Urination: Care Instructions. \"     If you do not have an account, please click on the \"Sign Up Now\" link. Current as of: August 21, 2019Content Version: 12.4  © 0745-7267 DeckDAQ. Care instructions adapted under license by [facility] ([facility]). If you have questions about a medical condition or this instruction, always ask your healthcare professional. [address] any warranty or liability for your use of this information. Patient Education        Upper and Middle Back (Thoracic) Strain: Care Instructions  Overview    A back strain happens when you overstretch, or pull, a muscle in your back. You may hurt your back in an accident or when you exercise or lift something. Sometimes you may not know how you hurt your back. Most back pain will get better with rest and time. You can take care of yourself at home to help your back heal.  Follow-up care is a key part of your treatment and safety. Be sure to make and go to all appointments, and call your doctor if you are having problems.  It's also a good idea to know your test results If you do not have an account, please click on the \"Sign Up Now\" link. Current as of: June 26, 2019Content Version: 12.4  © 9125-5214 Healthwise, Incorporated. Care instructions adapted under license by [facility] ([facility]). If you have questions about a medical condition or this instruction, always ask your healthcare professional. [address] any warranty or liability for your use of this information. Patient Education        Healthy Upper Back: Exercises  Introduction  Here are some examples of exercises for your upper back. Start each exercise slowly. Ease off the exercise if you start to have pain. Your doctor or physical therapist will tell you when you can start these exercises and which ones will work best for you. How to do the exercises  Lower neck and upper back stretch   1. Stretch your arms out in front of your body. Clasp one hand on top of your other hand. 2. Gently reach out so that you feel your shoulder blades stretching away from each other. 3. Gently bend your head forward. 4. Hold for 15 to 30 seconds. 5. Repeat 2 to 4 times. Midback stretch   1. Kneel on the floor, and sit back on your ankles. 2. Lean forward, place your hands on the floor, and stretch your arms out in front of you. Rest your head between your arms. 3. Gently push your chest toward the floor, reaching as far in front of you as possible. 4. Hold for 15 to 30 seconds. 5. Repeat 2 to 4 times. Shoulder rolls   1. Sit comfortably with your feet shoulder-width apart. You can also do this exercise while standing. 2. Roll your shoulders up, then back, and then down in a smooth, circular motion. 3. Repeat 2 to 4 times. Wall push-up   1. Stand against a wall with your feet about 12 to 24 inches back from the wall. If you feel any pain when you do this exercise, stand closer to the wall. 2. Place your hands on the wall slightly wider apart than your shoulders, and lean forward.   3. Gently lean your body toward the wall. Then push back to your starting position. Keep the motion smooth and controlled. 4. Repeat 8 to 12 times. Resisted shoulder blade squeeze   1. Sit or stand, holding the band in both hands in front of you. Keep your elbows close to your sides, bent at a 90-degree angle. Your palms should face up. 2. Squeeze your shoulder blades together, and move your arms to the outside, stretching the band. Be sure to keep your elbows at your sides while you do this. 3. Relax. 4. Repeat 8 to 12 times. Resisted rows   1. Put the band around a solid object, such as a bedpost, at about waist level. Hold one end of the band in each hand. 2. With your elbows at your sides and bent to 90 degrees, pull the band back to move your shoulder blades toward each other. Return to the starting position. 3. Repeat 8 to 12 times. Follow-up care is a key part of your treatment and safety. Be sure to make and go to all appointments, and call your doctor if you are having problems. It's also a good idea to know your test results and keep a list of the medicines you take. Where can you learn more? Go to https://SnagstapeWaysGoeb.Youxigu. org and sign in to your Groove [employer]arma account. Enter Y386 in the [employer]amb[employer] [place] box to learn more about \"Healthy Upper Back: Exercises. \"     If you do not have an account, please click on the \"Sign Up Now\" link. Current as of: June 26, 2019Content Version: 12.4  © 9471-5718 Healthwise, Incorporated. Care instructions adapted under license by [facility] ([facility]). If you have questions about a medical condition or this instruction, always ask your healthcare professional. [place] [address] any warranty or liability for your use of this information. Patient Education        Acute Low Back Pain: Exercises  Introduction  Here are some examples of typical rehabilitation exercises for your condition. Start each exercise slowly.  Ease off the exercise if you start to have pain. Your doctor or physical therapist will tell you when you can start these exercises and which ones will work best for you. When you are not being active, find a comfortable position for rest. Some people are comfortable on the floor or a medium-firm bed with a small pillow under their head and another under their knees. Some people prefer to lie on their side with a pillow between their knees. Don't stay in one position for too long. Take short walks (10 to 20 minutes) every 2 to 3 hours. Avoid slopes, hills, and stairs until you feel better. Walk only distances you can manage without pain, especially leg pain. How to do the exercises  Back stretches   6. Get down on your hands and knees on the floor. 7. Relax your head and allow it to droop. Round your back up toward the ceiling until you feel a nice stretch in your upper, middle, and lower back. Hold this stretch for as long as it feels comfortable, or about 15 to 30 seconds. 8. Return to the starting position with a flat back while you are on your hands and knees. 9. Let your back sway by pressing your stomach toward the floor. Lift your buttocks toward the ceiling. 10. Hold this position for 15 to 30 seconds. 11. Repeat 2 to 4 times. Follow-up care is a key part of your treatment and safety. Be sure to make and go to all appointments, and call your doctor if you are having problems. It's also a good idea to know your test results and keep a list of the medicines you take. Where can you learn more? Go to https://My Hoodsandra.Helical IT Solutions. org and sign in to your Sensorly account. Enter X692 in the mAPPn box to learn more about \"Acute Low Back Pain: Exercises. \"     If you do not have an account, please click on the \"Sign Up Now\" link. Current as of: June 26, 2019Content Version: 12.4  © 4487-9149 Healthwise, Incorporated. Care instructions adapted under license by [address]  If you have questions about

## 2020-04-29 NOTE — TELEPHONE ENCOUNTER
Patient will need lab orders mailed as she gets them through [employer]. Also, please print and mail patient instructions (back pain, kegels, etc) that I put in AVS instructions. Thanks!

## 2020-04-29 NOTE — TELEPHONE ENCOUNTER
Pt returned call from 04/14, pt states she doesn't have Internet or a cell phone to do visit. Pt denied VV and to be seen. Please advise.

## 2020-11-09 ENCOUNTER — OFFICE VISIT (OUTPATIENT)
Dept: FAMILY MEDICINE CLINIC | Age: 60
End: 2020-11-09
Payer: MEDICARE

## 2020-11-09 VITALS
SYSTOLIC BLOOD PRESSURE: 91 MMHG | WEIGHT: 177.6 LBS | TEMPERATURE: 98 F | DIASTOLIC BLOOD PRESSURE: 61 MMHG | RESPIRATION RATE: 12 BRPM | HEART RATE: 76 BPM | BODY MASS INDEX: 31.47 KG/M2 | HEIGHT: 63 IN

## 2020-11-09 LAB — HBA1C MFR BLD: 6 %

## 2020-11-09 PROCEDURE — 83036 HEMOGLOBIN GLYCOSYLATED A1C: CPT | Performed by: FAMILY MEDICINE

## 2020-11-09 PROCEDURE — 90686 IIV4 VACC NO PRSV 0.5 ML IM: CPT | Performed by: FAMILY MEDICINE

## 2020-11-09 PROCEDURE — 99214 OFFICE O/P EST MOD 30 MIN: CPT | Performed by: FAMILY MEDICINE

## 2020-11-09 PROCEDURE — G0008 ADMIN INFLUENZA VIRUS VAC: HCPCS | Performed by: FAMILY MEDICINE

## 2020-11-09 RX ORDER — BUPROPION HYDROCHLORIDE 200 MG/1
200 TABLET, EXTENDED RELEASE ORAL 2 TIMES DAILY
Qty: 180 TABLET | Refills: 1 | Status: SHIPPED | OUTPATIENT
Start: 2020-11-09 | End: 2021-06-22

## 2020-11-09 RX ORDER — AMLODIPINE BESYLATE 10 MG/1
10 TABLET ORAL DAILY
Qty: 90 TABLET | Refills: 1 | Status: SHIPPED | OUTPATIENT
Start: 2020-11-09 | End: 2021-06-22 | Stop reason: SDUPTHER

## 2020-11-09 RX ORDER — CARVEDILOL 25 MG/1
25 TABLET ORAL 2 TIMES DAILY WITH MEALS
Qty: 180 TABLET | Refills: 1 | Status: SHIPPED | OUTPATIENT
Start: 2020-11-09 | End: 2021-06-22 | Stop reason: SDUPTHER

## 2020-11-09 NOTE — PROGRESS NOTES
Immunizations Administered     Name Date Dose Route    Influenza, Quadv, IM, PF (6 mo and older Fluzone, Flulaval, Fluarix, and 3 yrs and older Afluria) 11/9/2020 0.5 mL Intramuscular    Site: Dorsogluteal- Left    Lot: [id number]    [address]acharia[address]: [id number]

## 2020-11-09 NOTE — PROGRESS NOTES
[address] [place]  [address] [facility], [address]  Ph:   [phone]  Fax: [phone]    Provider:  Dr. Melanie Fuller    Patient:  Murray Krishnan  YOB: 1960      Visit Date:  11/9/2020     Reason For Visit:   Chief Complaint   Patient presents with    Follow-up    Hypertension    Hyperlipidemia    Diabetes        Eri Aguirre is a 64 y.o. female who comes today to the office for follow-up of hypertension, diabetes mellitus type 2,  hyperlipidemia, and depresion. Today she complains of bilateral shoulder pain for about 9 months. With certain movement she can hear a grinding nose. Pain is aggravated by elevating the arms about the shoulder level. No hx of injury. She worked in a [address] for several years that required her to put \"vents\" in a higher place above her head. Hypertension:  She is taking Amlodipine 10 mg 1 tablet PO daily and Carvedilol 25 mg 1 tablet PO BID. She denies any side effects from the medications. She is trying to exercise with 2 pound weights, sit up, 20 in the morning. She is not adherent to low salt diet. Her blood pressure is well controlled at home. She denies dyspnea, irregular heart beat, lower extremity edema, near-syncope, orthopnea and palpitations. Cardiovascular risk factors: diabetes mellitus, dyslipidemia, hypertension, obesity (BMI >= 30 kg/m2), sedentary lifestyle and smoking/ tobacco exposure. Use of agents associated with hypertension: thyroid hormones. She still smokes 1 cigarette an hour, 12 a day. Diabetes Mellitus: She is taking Januvia 100 mg 1 tablet PO daily and she denies side effects from the medicine. She denies hyperglycemia, paresthesia of the feet, polydipsia, polyuria, visual disturbances and weight loss. She does not check sugars. HgbA1c was 6.0% today. She is not going to the [facility]. She is up to date on Prevnar, Influenza, 3 hepatitis B  and Pneumovax.   She is not up to date with his Ophthalmologist or Podiatrist visit. Hyperlipidemia:  She is not taking anything at this point. She tried Crestor 10 mg and Zetia but she had muscle aching. She takes fish oil OTC. There is not a family history of hyperlipidemia. There is not a family history of early ischemic heart disease. She is adherent to low cholesterol diet. Blood test done in May 30, 2019 showed a total cholesterol 215, triglycerides 128, HDL 43 and     Depression:  She is taking Wellbutrin  mg 1 PO BID and she feels that it can be increased. She is feeling down since the pandemia. Significant Past Medical History:    Past Medical History:   Diagnosis Date    [facility]chronic obstructive pulmonary disease) ([facility] [address]     Degenerative disc disease 1996    Depression 07/2016    Diverticulitis     Graves disease 2011    Hyperlipidemia 2010    Hypertension 2011    Spinal stenosis 04/2015    Type II or unspecified type diabetes mellitus without mention of complication, not stated as uncontrolled 2014    Vitamin D deficiency 2012           Allergies:  is allergic to codeine. Medications:   Current Outpatient Medications   Medication Sig Dispense Refill    SITagliptin (JANUVIA) 100 MG tablet Take 1 tablet by mouth daily 90 tablet 1    carvedilol (COREG) 25 MG tablet Take 1 tablet by mouth 2 times daily (with meals) 180 tablet 1    amLODIPine (NORVASC) 10 MG tablet Take 1 tablet by mouth daily 90 tablet 1    buPROPion (WELLBUTRIN SR) 200 MG extended release tablet Take 1 tablet by mouth 2 times daily 180 tablet 1    blood glucose monitor kit and supplies Test 1 times a day & as needed for symptoms of irregular blood glucose. 1 kit 0    gabapentin (NEURONTIN) 300 MG capsule Take 1 capsule by mouth nightly as needed (nerve pain).  90 capsule 1    albuterol sulfate HFA (PROAIR HFA) 108 (90 Base) MCG/ACT inhaler Inhale 2 puffs into the lungs every 6 hours as needed for Wheezing 1 Inhaler 11    umeclidinium-vilanterol (ANORO ELLIPTA) 62.5-25 MCG/INH AEPB inhaler Inhale 1 puff into the lungs daily 1 each 11     No current facility-administered medications for this visit.         Review of systems:  Review of Systems - History obtained from chart review and the patient  General ROS: negative for - chills, fatigue or fever  Psychological ROS: negative for - anxiety, depression or sleep disturbances  ENT ROS: negative for - headaches, nasal congestion or nasal discharge  Allergy and Immunology ROS: negative for - seasonal allergies  Hematological and Lymphatic ROS: negative for - bruising  Endocrine ROS: negative for - malaise/lethargy, polydipsia/polyuria or temperature intolerance  Respiratory ROS: negative for - cough,  shortness of breath or wheezing  Cardiovascular ROS: negative for - chest pain or edema  Gastrointestinal ROS: positive for -  diarrhea   Musculoskeletal ROS: negative for - joint pain or muscle pain  Neurological ROS: negative for - dizziness  Dermatological ROS: negative for - rash    Physical Examination:  BP 91/61 (Site: Right Upper Arm, Position: Sitting, Cuff Size: Large Adult)   Pulse 76   Temp 98 °F (36.7 °C) (Temporal)   Resp 12   Ht 5' 2.5\" (1.588 m)   Wt 177 lb 9.6 oz (80.6 kg)   BMI 31.97 kg/m²     General-  Alert and oriented x 3, NAD  HEENT: NC, AT, PERRLA, EOMI, anicteric sclerae  Ears: Normal tympanic membranes bilaterally  Nose: patent, no lesions  Mouth: no lesions, moist mucosas  Neck - supple, no significant adenopathy  Chest - clear to auscultation, no wheezes, rales or rhonchi, symmetric air entry  Heart - normal rate, regular rhythm, normal S1, S2, no murmurs, rubs, clicks or gallops  Abdomen - soft, nontender, nondistended, no masses or organomegaly  Extremities - peripheral pulses normal, no pedal edema, no clubbing or cyanosis  Visual inspection:  Deformity/amputation: absent  Skin lesions/pre-ulcerative calluses: absent  Edema: right- trace, left- trace  Sensory exam:  Monofilament sensation: normal  (minimum of 5 random plantar locations tested, avoiding callused areas - > 1 area with absence of sensation is + for neuropathy)  Plus at least one of the following:  Pulses: normal,   Pinprick: Intact  Proprioception: Intact  Vibration (128 Hz): N/A    Impression:     Diagnosis Orders   1. Hypercholesterolemia     2. Type 2 diabetes mellitus without complication, without long-term current use of insulin ([facility])  SITagliptin (JANUVIA) 100 MG tablet    POCT glycosylated hemoglobin (Hb A1C)   3. Essential hypertension  carvedilol (COREG) 25 MG tablet    amLODIPine (NORVASC) 10 MG tablet   4. Major depressive disorder with single episode, in partial remission ([facility] [address]     5. Chronic pain of both shoulders         Plan:  Continue Norvasc 10 mg 1 tablet PO daily  Continue Coreg 25 mg 1 tablet PO BID  Change Wellbutrin SR to 200 mg 1 tablet PO BID  Continue Januvia 100 mg 1 tablet PO daily      Orders Placed This Encounter   Medications    SITagliptin (JANUVIA) 100 MG tablet     Sig: Take 1 tablet by mouth daily     Dispense:  90 tablet     Refill:  1    carvedilol (COREG) 25 MG tablet     Sig: Take 1 tablet by mouth 2 times daily (with meals)     Dispense:  180 tablet     Refill:  1    amLODIPine (NORVASC) 10 MG tablet     Sig: Take 1 tablet by mouth daily     Dispense:  90 tablet     Refill:  1    buPROPion (WELLBUTRIN SR) 200 MG extended release tablet     Sig: Take 1 tablet by mouth 2 times daily     Dispense:  180 tablet     Refill:  1       Follow Up:  Return in about 29 days (around 12/8/2020) for Medicare AWV.     Yazmin Zayas MD

## 2020-11-10 LAB
ABSOLUTE BASO #: 0.1 X10E9/L (ref 0–0.2)
ABSOLUTE EOS #: 0.4 X10E9/L (ref 0–0.4)
ABSOLUTE LYMPH #: 3 X10E9/L (ref 1–3.5)
ABSOLUTE MONO #: 1 X10E9/L (ref 0–0.9)
ABSOLUTE NEUT #: 6.7 X10E9/L (ref 1.5–6.6)
ALBUMIN SERPL-MCNC: 4.4 G/DL (ref 3.2–5.3)
ALK PHOSPHATASE: 100 U/L (ref 39–130)
ALT SERPL-CCNC: 20 U/L (ref 0–31)
ANION GAP SERPL CALCULATED.3IONS-SCNC: 9 MMOL/L (ref 5–15)
APPEARANCE: CLEAR
AST SERPL-CCNC: 16 U/L (ref 0–41)
BASOPHILS RELATIVE PERCENT: 0.8 %
BILIRUB SERPL-MCNC: 0.5 MG/DL (ref 0.3–1.2)
BILIRUBIN: NEGATIVE
BUN BLDV-MCNC: 9 MG/DL (ref 5–23)
CALCIUM SERPL-MCNC: 9.7 MG/DL (ref 8.5–10.5)
CHLORIDE BLD-SCNC: 103 MMOL/L (ref 98–109)
CHOLESTEROL/HDL RATIO: 4.7 (ref 1–5)
CHOLESTEROL: 199 MG/DL (ref 150–200)
CO2: 31 MMOL/L (ref 22–32)
COLOR: YELLOW
CREAT SERPL-MCNC: 0.69 MG/DL (ref 0.4–1)
CREATINE, URINE: 71.16 MG/DL
EGFR AFRICAN AMERICAN: >60 ML/MIN/1.73SQ.M
EGFR IF NONAFRICAN AMERICAN: >60 ML/MIN/1.73SQ.M
EOSINOPHILS RELATIVE PERCENT: 3.7 %
GLUCOSE BLD-MCNC: NEGATIVE MG/DL
GLUCOSE: 100 MG/DL (ref 65–99)
HCT VFR BLD CALC: 45.5 % (ref 35–47)
HDLC SERPL-MCNC: 42 MG/DL
HEMOGLOBIN: 15 G/DL (ref 11.7–15.5)
KETONES, URINE: NEGATIVE MG/DL
LDL CHOLESTEROL CALCULATED: 126 MG/DL
LDL/HDL RATIO: 3
LEUKOCYTE ESTERASE, URINE: NEGATIVE
LYMPHOCYTE %: 26.8 %
MCH RBC QN AUTO: 30.2 PG (ref 27–34)
MCHC RBC AUTO-ENTMCNC: 33.1 G/DL (ref 32–36)
MCV RBC AUTO: 92 FL (ref 80–100)
MICROALBUMIN/CREAT 24H UR: <0.7 MG/DL (ref 0–1.9)
MICROALBUMIN/CREAT UR-RTO: <9.8 MG/G CREAT (ref 0–30)
MONOCYTES # BLD: 9.2 %
NEUTROPHILS RELATIVE PERCENT: 59.5 %
NITRITE, URINE: NEGATIVE
OCCULT BLOOD,URINE: NEGATIVE
OTHER MICROSCOPIC ELEMENTS: NORMAL
PDW BLD-RTO: 13.3 % (ref 11.5–15)
PH: 7 (ref 5–8.5)
PLATELETS: 271 X10E9/L (ref 150–450)
PMV BLD AUTO: 8.5 FL (ref 7–12)
POTASSIUM SERPL-SCNC: 4.2 MMOL/L (ref 3.5–5)
PROTEIN, URINE: NEGATIVE MG/DL
RBC: 4.97 X10E12/L (ref 3.8–5.2)
RBC: <1 /HPF (ref 0–5)
SODIUM BLD-SCNC: 143 MMOL/L (ref 134–146)
SP GRAVITY MISCELLANEOUS: 1.01 (ref 1–1.03)
TOTAL PROTEIN: 7.4 G/DL (ref 6–8)
TRIGL SERPL-MCNC: 156 MG/DL (ref 27–150)
TSH SERPL DL<=0.05 MIU/L-ACNC: 2.12 UIU/ML (ref 0.49–4.67)
UROBILINOGEN, URINE: <1.1 EU/DL
VLDLC SERPL CALC-MCNC: 31 MG/DL (ref 0–30)
WBC: 11.3 X10E9/L (ref 4–11)
WBC: 2 /HPF (ref 0–5)

## 2020-12-09 ENCOUNTER — OFFICE VISIT (OUTPATIENT)
Dept: FAMILY MEDICINE CLINIC | Age: 60
End: 2020-12-09
Payer: MEDICARE

## 2020-12-09 VITALS
OXYGEN SATURATION: 94 % | HEART RATE: 77 BPM | WEIGHT: 177.6 LBS | TEMPERATURE: 98 F | BODY MASS INDEX: 31.47 KG/M2 | HEIGHT: 63 IN | SYSTOLIC BLOOD PRESSURE: 139 MMHG | RESPIRATION RATE: 14 BRPM | DIASTOLIC BLOOD PRESSURE: 85 MMHG

## 2020-12-09 PROCEDURE — G0438 PPPS, INITIAL VISIT: HCPCS | Performed by: FAMILY MEDICINE

## 2020-12-09 ASSESSMENT — LIFESTYLE VARIABLES
AUDIT-C TOTAL SCORE: INCOMPLETE
HOW OFTEN DO YOU HAVE A DRINK CONTAINING ALCOHOL: 0
AUDIT TOTAL SCORE: INCOMPLETE
HOW OFTEN DO YOU HAVE A DRINK CONTAINING ALCOHOL: NEVER

## 2020-12-09 ASSESSMENT — PATIENT HEALTH QUESTIONNAIRE - PHQ9
2. FEELING DOWN, DEPRESSED OR HOPELESS: 0
SUM OF ALL RESPONSES TO PHQ QUESTIONS 1-9: 0
SUM OF ALL RESPONSES TO PHQ QUESTIONS 1-9: 0
SUM OF ALL RESPONSES TO PHQ9 QUESTIONS 1 & 2: 0
1. LITTLE INTEREST OR PLEASURE IN DOING THINGS: 0
SUM OF ALL RESPONSES TO PHQ QUESTIONS 1-9: 0

## 2020-12-09 NOTE — PROGRESS NOTES
Advance Care Planning   Advanced Care Planning: Discussed the patients choices for care and treatment in case of a health event that adversely affects decision-making abilities. Also discussed the patients long-term treatment options. Reviewed with the patient the [address] [place] [address] of [address] [place] [address] Declaration forms  Reviewed the process of designating a competent adult as an Agent (or -in-fact) that could take make health care decisions for the patient if incompetent. Patient was asked to complete the declaration forms, either acknowledge the forms by a public notary or an eligible witness and provide a signed copy to the practice office. Time spent (minutes): 2      Cardiovascular Disease Risk Counseling: Assessed the patient's risk to develop cardiovascular disease and reviewed main risk factors. Reviewed steps to reduce disease risk including:   · Quitting tobacco use, reducing amount smoked, or not starting the habit  · Making healthy food choices  · Being physically active and gradualy increasing activity levels   · Reduce weight and determine a healthy BMI goal  · Monitor blood pressure and treat if higher than 140/90 mmHg  · Maintain blood total cholesterol levels under 5 mmol/l or 190 mg/dl  · Maintain LDL cholesterol levels under 3.0 mmol/l or 115 mg/dl   · Control blood glucose levels  · Consider taking aspirin (75 mg daily), once blood pressure is controlled   Provided a follow up plan. Time spent (minutes): 5    Medicare Annual Wellness Visit    Name: Curtis Huang Date: 2020   MRN: [id number] Sex: Female   Age: 64 y.o. Ethnicity: Non-/Non    : 1960 Race: Renato Velasquez is here for Annual Exam and Other (wants shingles vaccine)    Screenings for behavioral, psychosocial and functional/safety risks, and cognitive dysfunction are all negative except as indicated below.  These results, as well as other patient data from the [facility] of Onset    Cancer Mother 67        Pancreatic Cancer    COPD Father     Diabetes Father     Diabetes Brother 27    Other Maternal Grandfather         Pneumonia    Rheum Arthritis Maternal Grandfather     Diabetes Paternal Grandmother     Other Paternal Grandmother         Enlarged Heart    Heart Disease Paternal Grandfather     Other Paternal Grandfather         Aorta Ruptured during surgery    Colon Cancer Neg Hx        CareTeam (Including outside providers/suppliers regularly involved in providing care):   Patient Care Team:  Abilio Pierce MD as PCP - General (Family Medicine)  Abilio Pierce MD as PCP - [facility] Empaneled Provider    Wt Readings from Last 3 Encounters:   12/09/20 177 lb 9.6 oz (80.6 kg)   11/09/20 177 lb 9.6 oz (80.6 kg)   10/30/19 180 lb 6.4 oz (81.8 kg)     Vitals:    12/09/20 1359   BP: 139/85   Site: Left Upper Arm   Position: Sitting   Cuff Size: Medium Adult   Pulse: 77   Resp: 14   Temp: 98 °F (36.7 °C)   TempSrc: Temporal   SpO2: 94%   Weight: 177 lb 9.6 oz (80.6 kg)   Height: 5' 2.5\" (1.588 m)     Body mass index is 31.97 kg/m². Based upon direct observation of the patient, evaluation of cognition reveals recent and remote memory intact. Patient's complete Health Risk Assessment and screening values have been reviewed and are found in Flowsheets. The following problems were reviewed today and where indicated follow up appointments were made and/or referrals ordered.     Positive Risk Factor Screenings with Interventions:     Substance History:  Social History     Tobacco History     Smoking Status  Current Every Day Smoker Smoking Start Date  5/25/1979 Smoking Frequency  1 pack/day for 42 years (42 pk yrs) Smoking Tobacco Type  Cigarettes    Smokeless Tobacco Use  Never Used    Tobacco Comment  pt smokes 22-23 cigs a day           Alcohol History     Alcohol Use Status  Yes Drinks/Week  1 Shots of liquor, 0 Standard drinks or equivalent per week Amount  1.0 standard drinks of No  Have you had an eye exam within the past year?: (!) No  Hearing/Vision Interventions:  · Vision concerns:  patient encouraged to make appointment with his/her eye specialist    Safety:  Safety  Do you have working smoke detectors?: Yes  Have all throw rugs been removed or fastened?: Yes  Do you have non-slip mats or surfaces in all bathtubs/showers?: Yes  Do all of your stairways have a railing or banister?: (!) No  Are your doorways, halls and stairs free of clutter?: Yes  Do you always fasten your seatbelt when you are in a car?: Yes  Safety Interventions:  · Home safety tips provided    Personalized Preventive Plan   Current Health Maintenance Status  Immunization History   Administered Date(s) Administered    Hepatitis B (Recombivax HB) 10/08/2015, 12/21/2015, 04/25/2016    Influenza Virus Vaccine 10/08/2015    Influenza, Darrall Spittle, IM, (6 mo and older Fluzone, Flulaval, Fluarix and 3 yrs and older Afluria) 09/21/2016, 01/14/2019, 10/30/2019    Influenza, Quadv, IM, PF (6 mo and older Fluzone, Flulaval, Fluarix, and 3 yrs and older Afluria) 11/09/2020    Pneumococcal Conjugate 13-valent ([id number]) 08/13/2015    Pneumococcal Polysaccharide ([id number]) 11/14/2016    Tdap (Boostrix, Adacel) 08/13/2015    Zoster Recombinant (Shingrix) 12/09/2020        Health Maintenance   Topic Date Due    Diabetic retinal exam  10/08/2016    Breast cancer screen  10/08/2017    Cervical cancer screen  10/08/2018    Annual Wellness Visit (AWV)  05/29/2019    Diabetic foot exam  10/30/2020    Low dose CT lung screening  01/28/2021    Shingles Vaccine (2 of 2) 02/03/2021    A1C test (Diabetic or Prediabetic)  11/09/2021    Diabetic microalbuminuria test  11/10/2021    Lipid screen  11/10/2021    TSH testing  11/10/2021    Potassium monitoring  11/10/2021    Creatinine monitoring  11/10/2021    DTaP/Tdap/Td vaccine (2 - Td) 08/13/2025    Colon cancer screen colonoscopy  10/12/2028    Hepatitis B vaccine Completed    Flu vaccine  Completed    Pneumococcal 0-64 years Vaccine  Completed    Hepatitis C screen  Addressed    HIV screen  Addressed    Hepatitis A vaccine  Aged Out    Hib vaccine  Aged Out    Meningococcal (ACWY) vaccine  Aged Out     Recommendations for [employer] Due: see orders and patient instructions/AVS.  . Recommended screening schedule for the next 5-10 years is provided to the patient in written form: see Patient Claudia Oconnor was seen today for annual exam and other. Diagnoses and all orders for this visit:    Encounter for screening mammogram for breast cancer  -     MATTHEW Digital Screen Bilateral [[id number]];  Future    Routine general medical examination at a health care facility    Other orders  -     zoster recombinant adjuvanted vaccine [facility]) injection 50 mcg          Debra Modi MD

## 2020-12-09 NOTE — PATIENT INSTRUCTIONS
p.m. Avoid getting sunburned. Sunburn can increase your risk of skin cancer. Talk to your doctor about taking a calcium plus vitamin D supplement. Ask about what type of calcium is right for you, and how much to take at a time. Adults ages 23 to 48 should not get more than 2,500 mg of calcium and 4,000 IU of vitamin D each day, whether it is from supplements and/or food. Adults ages 46 and older should not get more than 2,000 mg of calcium and 4,000 IU of vitamin D each day from supplements and/or food. Get regular bone-building exercise. Weight-bearing and resistance exercises keep bones healthy by working the muscles and bones against gravity. Start out at an exercise level that feels right for you. Add a little at a time until you can do the following:  Do 30 minutes of weight-bearing exercise on most days of the week. Walking, jogging, stair climbing, and dancing are good choices. Do resistance exercises with weights or elastic bands 2 to 3 days a week. Limit alcohol. Drink no more than 1 alcohol drink a day if you are a woman. Drink no more than 2 alcohol drinks a day if you are a man. Do not smoke. Smoking can make bones thin faster. If you need help quitting, talk to your doctor about stop-smoking programs and medicines. These can increase your chances of quitting for good. When should you call for help? Watch closely for changes in your health, and be sure to contact your doctor if:  You need help with a healthy eating plan. You need help with an exercise plan    © 5722-4250 [employer]. Care instructions adapted under license by [facility]. This care instruction is for use with your licensed healthcare professional. If you have questions about a medical condition or this instruction, always ask your healthcare professional. [place] [address] any warranty or liability for your use of this information. Content Version: 9.4.50902;  Last Revised: June 20, impair your mental function. For example, vitamin B12 deficiency can cause a range of symptoms, including confusion. But, what if your nutritional needs are being met? Can herbs and supplements still offer a benefit? Researchers have investigated a range of natural remedies, such as ginkgo , ginseng , and the supplement phosphatidylserine (PS). So far, though, the evidence is inconsistent as to whether these products can improve memory or thinking. If you are interested in taking herbs and supplements, talk to your doctor first because they may interact with other medicines that you are taking. Exercise Regularly    Among the many benefits of regular exercise are increased blood flow to the brain and decreased risk of certain diseases that can interfere with memory function. One study found that even moderate exercise has a beneficial effect. Examples of \"moderate\" exercise include:   Playing 18 holes of golf once a week, without a cart   Playing tennis twice a week   Walking one mile per day   Manage Stress    It can be tough to remember what is important when your mind is cluttered. Make time for relaxation. Choose activities that calm you down, and make it routine. Manage Chronic Conditions    Side effects of high blood pressure , diabetes, and heart disease can interfere with mental function. Many of the lifestyle steps discussed here can help manage these conditions. Strive to eat a healthy diet, exercise regularly, get stress under control, and follow your doctor's advice for your condition. Minimize Medications    Talk to your doctor about the medicines that you take. Some may be unnecessary. Also, healthy lifestyle habits may lower the need for certain drugs. Last Reviewed: April 2010 Lena Rivas MD   Updated: 4/13/2010   ·   Smoking Cessation  This document explains the best ways for you to quit smoking and new treatments to help.  It lists new medicines that can double or triple your chances of quitting and quitting for good. It also considers ways to avoid relapses and concerns you may have about quitting, including weight gain. NICOTINE: A POWERFUL ADDICTION  If you have tried to quit smoking, you know how hard it can be. It is hard because nicotine is a very addictive drug. For some people, it can be as addictive as heroin or cocaine. Usually, people make 2 or 3 tries, or more, before finally being able to quit. Each time you try to quit, you can learn about what helps and what hurts. Quitting takes hard work and a lot of effort, but you can quit smoking. QUITTING SMOKING IS ONE OF THE MOST IMPORTANT THINGS YOU WILL EVER DO. You will live longer, feel better, and live better. The impact on your body of quitting smoking is felt almost immediately:  Within 20 minutes, blood pressure decreases. Pulse returns to its normal level. After 8 hours, carbon monoxide levels in the blood return to normal. Oxygen level increases. After 24 hours, chance of heart attack starts to decrease. Breath, hair, and body stop smelling like smoke. After 48 hours, damaged nerve endings begin to recover. Sense of taste and smell improve. After 72 hours, the body is virtually free of nicotine. Bronchial tubes relax and breathing becomes easier. After 2 to 12 weeks, lungs can hold more air. Exercise becomes easier and circulation improves. Quitting will reduce your risk of having a heart attack, stroke, cancer, or lung disease:  After 1 year, the risk of coronary heart disease is cut in half. After 5 years, the risk of stroke falls to the same as a nonsmoker. After 10 years, the risk of lung cancer is cut in half and the risk of other cancers decreases significantly. After 15 years, the risk of coronary heart disease drops, usually to the level of a nonsmoker. If you are pregnant, quitting smoking will improve your chances of having a healthy baby.   The people you live with, especially your children, will be someone, go for a walk, or occupy your time with a task. When you first try to quit, change your routine. Take a different route to work. Drink tea instead of coffee. Eat breakfast in a different place. Do something to reduce your stress. Take a hot bath, exercise, or read a book. Plan something enjoyable to do every day. Reward yourself for not smoking. Explore interactive web-based programs that specialize in helping you quit. 4. GET MEDICINE AND USE IT CORRECTLY  Medicines can help you stop smoking and decrease the urge to smoke. Combining medicine with the above behavioral methods and support can quadruple your chances of successfully quitting smoking. The [place] Food and Drug Administration (FDA) has approved 7 medicines to help you quit smoking. These medicines fall into 3 categories. Nicotine replacement therapy (delivers nicotine to your body without the negative effects and risks of smoking):  Nicotine gum: Available over-the-counter. Nicotine lozenges: Available over-the-counter. Nicotine inhaler: Available by prescription. Nicotine nasal spray: Available by prescription. Nicotine skin patches (transdermal): Available by prescription and over-the-counter. Antidepressant medicine (helps people abstain from smoking, but how this works is unknown): Bupropion sustained-release (SR) tablets: Available by prescription. Nicotinic receptor partial agonist (simulates the effect of nicotine in your brain):  Varenicline tartrate tablets: Available by prescription. Ask your caregiver for advice about which medicines to use and how to use them. Carefully read the information on the package. Everyone who is trying to quit may benefit from using a medicine. If you are pregnant or trying to become pregnant, nursing an infant, you are under age 25, or you smoke fewer than 10 cigarettes per day, talk to your caregiver before taking any nicotine replacement medicines.   You should stop using a nicotine replacement product and call your caregiver if you experience nausea, dizziness, weakness, vomiting, fast or irregular heartbeat, mouth problems with the lozenge or gum, or redness or swelling of the skin around the patch that does not go away. Do not use any other product containing nicotine while using a nicotine replacement product. Talk to your caregiver before using these products if you have diabetes, heart disease, asthma, stomach ulcers, you had a recent heart attack, you have high blood pressure that is not controlled with medicine, a history of irregular heartbeat, or you have been prescribed medicine to help you quit smoking. 5. BE PREPARED FOR RELAPSE OR DIFFICULT SITUATIONS  Most relapses occur within the first 3 months after quitting. Do not be discouraged if you start smoking again. Remember, most people try several times before they finally quit. You may have symptoms of withdrawal because your body is used to nicotine. You may crave cigarettes, be irritable, feel very hungry, cough often, get headaches, or have difficulty concentrating. The withdrawal symptoms are only temporary. They are strongest when you first quit, but they will go away within 10 to 14 days. Here are some difficult situations to watch for:  Alcohol. Avoid drinking alcohol. Drinking lowers your chances of successfully quitting. Caffeine. Try to reduce the amount of caffeine you consume. It also lowers your chances of successfully quitting. Other smokers. Being around smoking can make you want to smoke. Avoid smokers. Weight gain. Many smokers will gain weight when they quit, usually less than 10 pounds. Eat a healthy diet and stay active. Do not let weight gain distract you from your main goal, quitting smoking. Some medicines that help you quit smoking may also help delay weight gain. You can always lose the weight gained after you quit. Bad mood or depression.  There are a lot of ways to improve your mood other than smoking. If you are having problems with any of these situations, talk to your caregiver. SPECIAL SITUATIONS AND CONDITIONS  Studies suggest that everyone can quit smoking. Your situation or condition can give you a special reason to quit. Pregnant women/new mothers: By quitting, you protect your baby's health and your own. Hospitalized patients: By quitting, you reduce health problems and help healing. Heart attack patients: By quitting, you reduce your risk of a second heart attack. Lung, head, and neck cancer patients: By quitting, you reduce your chance of a second cancer. Parents of children and adolescents: By quitting, you protect your children from illnesses caused by secondhand smoke. QUESTIONS TO THINK ABOUT  Think about the following questions before you try to stop smoking. You may want to talk about your answers with your caregiver. Why do you want to quit? If you tried to quit in the past, what helped and what did not? What will be the most difficult situations for you after you quit? How will you plan to handle them? Who can help you through the tough times? Your family? Friends? Caregiver? What pleasures do you get from smoking? What ways can you still get pleasure if you quit? Here are some questions to ask your caregiver: How can you help me to be successful at quitting? What medicine do you think would be best for me and how should I take it? What should I do if I need more help? What is smoking withdrawal like? How can I get information on withdrawal?  Quitting takes hard work and a lot of effort, but you can quit smoking. FOR MORE INFORMATION   Smokefree. gov ([employer]) provides free, accurate, evidence-based information and professional assistance to help support the immediate and long-term needs of people trying to quit smoking.   Document Released: 12/12/2002 Document Revised: 12/06/2012 Document Reviewed: 10/04/2010  ExitCare® Patient Information ©[id number] Marybel Sanchez. ·     Keeping Home a [facility]       As we get older, changes in balance, gait, strength, vision, hearing, and cognition make even the most youthful senior more prone to accidents. Falls are one of the leading health risks for older people. This increased risk of falling is related to:   Aging process (eg, decreased muscle strength, slowed reflexes)   Higher incidence of chronic health problems (eg, arthritis, diabetes) that may limit mobility, agility or sensory awareness   Side effects of medicine (eg, dizziness, blurred vision)especially medicines like prescription pain medicines and drugs used to treat mental health conditions   Depending on the brittleness of your bones, the consequences of a fall can be serious and long lasting. Home Life   Research by the Association of [facility]) shows that some home accidents among older adults can be prevented by making simple lifestyle changes and basic modifications and repairs to the home environment. Here are some lifestyle changes that experts recommend:   Have your hearing and vision checked regularly. Be sure to wear prescription glasses that are right for you. Speak to your doctor or pharmacist about the possible side effects of your medicines. A number of medicines can cause dizziness. If you have problems with sleep, talk to your doctor. Limit your intake of alcohol. If necessary, use a cane or walker to help maintain your balance. Wear supportive, rubber-soled shoes, even at home. If you live in a region that gets wintry weather, you may want to put special cleats on your shoes to prevent you from slipping on the snow and ice. Exercise regularly to help maintain muscle tone, agility, and balance. Always hold the banister when going up or down stairs. Also, use  bars when getting in or out of the bath or shower, or using the toilet. To avoid dizziness, get up slowly from a lying down position.  Sit up first, dangling your legs for a minute or two before rising to a standing position. Overall Home Safety Check   According to the Consumer Product Safety Commision's \"Older Consumer Home Safety Checklist,\" it is important to check for potential hazards in each room. And remember, proper lighting is an essential factor in home safety. If you cannot see clearly, you are more likely to fall. Important questions to ask yourself include:   Are lamp, electric, extension, and telephone cords placed out of the flow of traffic and maintained in good condition? Have frayed cords been replaced? Are all small rugs and runners slip resistant? If not, you can secure them to the floor with a special double-sided carpet tape. Are smoke detectors properly locatedone on every floor of your home and one outside of every sleeping area? Are they in good working order? Are batteries replaced at least once a year? Do you have a well-maintained carbon monoxide detector outside every sleeping are in your home? Does your furniture layout leave plenty of space to maneuver between and around chairs, tables, beds, and sofas? Are hallways, stairs and passages between rooms well lit? Can you reach a lamp without getting out of bed? Are floor surfaces well maintained? Shag rugs, high-pile carpeting, tile floors, and polished wood floors can be particularly slippery. Stairs should always have handrails and be carpeted or fitted with a non-skid tread. Is your telephone easily reachable. Is the cord safely tucked away? Room by Room   According to the Association of Aging, bathrooms and tr are the two most potentially hazardous rooms in your home. In the Kitchen    Be sure your stove is in proper working order and always make sure burners and the oven are off before you go out or go to sleep. Keep pots on the back burners, turn handles away from the front of the stove, and keep stove clean and free of grease build-up.     Kitchen ventilation systems and range exhausts should be working properly. Keep flammable objects such as towels and pot holders away from the cooking area except when in use. Make sure kitchen curtains are tied back. Move cords and appliances away from the sink and hot surfaces. If extension cords are needed, install wiring guides so they do not hang over the sink, range, or working areas. Look for coffee pots, kettles and toaster ovens with automatic shut-offs. Keep a mop handy in the kitchen so you can wipe up spills instantly. You should also have a small fire extinguisher. Arrange your kitchen with frequently used items on lower shelves to avoid the need to stand on a stepstool to reach them. Make sure countertops are well-lit to avoid injuries while cutting and preparing food. In the Bathroom    Use a non-slip mat or decals in the tub and shower, since wet, soapy tile or porcelain surfaces are extremely slippery. Make sure bathroom rugs are non-skid or tape them firmly to the floor. Bathtubs should have at least one, preferably two, grab bars, firmly attached to structural supports in the wall. (Do not use built-in soap holders or glass shower doors as grab bars.)    Tub seats fitted with non-slip material on the legs allow you to wash sitting down. For people with limited mobility, bathtub transfer benches allow you to slide safely into the tub. Raised toilet seats and toilet safety rails are helpful for those with knee or hip problems. In the [facility]    Make sure you use a nightlight and that the area around your bed is clear of potential obstacles. Be careful with electric blankets and never go to sleep with a heating pad, which can cause serious burns even if on a low setting. Use fire-resistant mattress covers and pillows, and NEVER smoke in bed. Keep a phone next to the bed that is programmed to dial 911 at the push of a button.       If you have a chronic condition, you may want to sign on with an automatic call-in service. Typically the system includes a small pendant that connects directly to an emergency medical voice-response system. You should also make arrangements to stay in contact with someonefriend, neighbor, family memberon a regular schedule. Fire Prevention   According to the [employer]. (Smoke Alarms for Every) [address] [place] [address], senior citizens are one of the two highest risk groups for death and serious injuries due to residential fires. When cooking, wear short-sleeved items, never a bulky long-sleeved robe. The [facility]'s Safety Checklist for Older Consumers emphasizes the importance of checking basements, garages, workshops and storage areas for fire hazards, such as volatile liquids, piles of old rags or clothing and overloaded circuits. Never smoke in bed or when lying down on a couch or recliner chair. Small portable electric or kerosene heaters are responsible for many home fires and should be used cautiously if at all. If you do use one, be sure to keep them away from flammable materials. In case of fire, make sure you have a pre-established emergency exit plan. Have a professional check your fireplace and other fuel-burning appliances yearly. [employer]   Baby boomers entering the wei years will continue to see the development of new products to help older adults live safely and independently in spite of age-related changes. Making Life More Livable  , by Brenda Lara, lists over 1,000 products for \"living well in the mature years,\" such as bathing and mobility aids, household security devices, ergonomically designed knives and peelers, and faucet valves and knobs for temperature control. Medical supply stores and organizations are good sources of information about products that improve your quality of life and insure your safety.      Last Reviewed: November 2009 Rea Opitz, MD   Updated: 3/7/2011     ·

## 2021-06-18 ENCOUNTER — TELEPHONE (OUTPATIENT)
Dept: FAMILY MEDICINE CLINIC | Age: 61
End: 2021-06-18

## 2021-06-18 ENCOUNTER — NURSE TRIAGE (OUTPATIENT)
Dept: OTHER | Facility: CLINIC | Age: 61
End: 2021-06-18

## 2021-06-18 NOTE — TELEPHONE ENCOUNTER
I spoke with Drew Herman on the phone. She has been having recurrent chest pains in the last several weeks. And she has other several complaints including leg pain, numbness and shoulder pain. Due to her strong history of diabetes, hypertension, hypercholesterolemia, and history of smoking I told her that she is a high risk for cardiovascular events. I have recommended her to go to emergency room if the symptoms are present. Otherwise we will bring her to see us at the office as soon as possible next week for outpatient work-up. She was asymptomatic while we were talking on the phone. She stated that she called the office to make an appointment to see me next week. But as soon as she mentioned that she has been having chest pain she was transferred to \"emergency room nurse\" and advised to go to the emergency room.

## 2021-06-22 ENCOUNTER — OFFICE VISIT (OUTPATIENT)
Dept: FAMILY MEDICINE CLINIC | Age: 61
End: 2021-06-22
Payer: MEDICARE

## 2021-06-22 ENCOUNTER — HOSPITAL ENCOUNTER (OUTPATIENT)
Dept: GENERAL RADIOLOGY | Age: 61
Discharge: HOME | End: 2021-06-22
Payer: MEDICARE

## 2021-06-22 ENCOUNTER — HOSPITAL ENCOUNTER (OUTPATIENT)
Age: 61
Discharge: HOME | End: 2021-06-22
Payer: MEDICARE

## 2021-06-22 VITALS
DIASTOLIC BLOOD PRESSURE: 64 MMHG | TEMPERATURE: 97.8 F | WEIGHT: 180.6 LBS | HEIGHT: 62 IN | BODY MASS INDEX: 33.23 KG/M2 | SYSTOLIC BLOOD PRESSURE: 108 MMHG | HEART RATE: 69 BPM | RESPIRATION RATE: 12 BRPM

## 2021-06-22 DIAGNOSIS — E78.00 HYPERCHOLESTEROLEMIA: Primary | ICD-10-CM

## 2021-06-22 DIAGNOSIS — R07.9 CHEST PAIN, UNSPECIFIED TYPE: ICD-10-CM

## 2021-06-22 DIAGNOSIS — M25.511 ACUTE PAIN OF RIGHT SHOULDER: ICD-10-CM

## 2021-06-22 DIAGNOSIS — Z87.891 PERSONAL HISTORY OF TOBACCO USE: ICD-10-CM

## 2021-06-22 DIAGNOSIS — E11.9 TYPE 2 DIABETES MELLITUS WITHOUT COMPLICATION, WITHOUT LONG-TERM CURRENT USE OF INSULIN (HCC): ICD-10-CM

## 2021-06-22 DIAGNOSIS — F32.4 MAJOR DEPRESSIVE DISORDER WITH SINGLE EPISODE, IN PARTIAL REMISSION (HCC): ICD-10-CM

## 2021-06-22 DIAGNOSIS — I10 ESSENTIAL HYPERTENSION: ICD-10-CM

## 2021-06-22 DIAGNOSIS — E78.00 HYPERCHOLESTEROLEMIA: ICD-10-CM

## 2021-06-22 LAB
ALBUMIN SERPL-MCNC: 4.6 G/DL (ref 3.5–5.1)
ALP BLD-CCNC: 100 U/L (ref 38–126)
ALT SERPL-CCNC: 14 U/L (ref 11–66)
ANION GAP SERPL CALCULATED.3IONS-SCNC: 9 MEQ/L (ref 8–16)
AST SERPL-CCNC: 16 U/L (ref 5–40)
BACTERIA: NORMAL
BASOPHILS # BLD: 0.7 %
BASOPHILS ABSOLUTE: 0.1 THOU/MM3 (ref 0–0.1)
BILIRUB SERPL-MCNC: 0.4 MG/DL (ref 0.3–1.2)
BILIRUBIN URINE: NEGATIVE
BLOOD, URINE: NEGATIVE
BUN BLDV-MCNC: 10 MG/DL (ref 7–22)
CALCIUM SERPL-MCNC: 10 MG/DL (ref 8.5–10.5)
CASTS: NORMAL /LPF
CASTS: NORMAL /LPF
CHARACTER, URINE: CLEAR
CHLORIDE BLD-SCNC: 102 MEQ/L (ref 98–111)
CHOLESTEROL, TOTAL: 209 MG/DL (ref 100–199)
CO2: 29 MEQ/L (ref 23–33)
COLOR: YELLOW
CREAT SERPL-MCNC: 0.6 MG/DL (ref 0.4–1.2)
CREATININE, URINE: 83.2 MG/DL
CRYSTALS: NORMAL
EOSINOPHIL # BLD: 2.7 %
EOSINOPHILS ABSOLUTE: 0.3 THOU/MM3 (ref 0–0.4)
EPITHELIAL CELLS, UA: NORMAL /HPF
ERYTHROCYTE [DISTWIDTH] IN BLOOD BY AUTOMATED COUNT: 12.2 % (ref 11.5–14.5)
ERYTHROCYTE [DISTWIDTH] IN BLOOD BY AUTOMATED COUNT: 41.6 FL (ref 35–45)
GFR SERPL CREATININE-BSD FRML MDRD: > 90 ML/MIN/1.73M2
GLUCOSE BLD-MCNC: 97 MG/DL (ref 70–108)
GLUCOSE, URINE: NEGATIVE MG/DL
HBA1C MFR BLD: 6.2 %
HCT VFR BLD CALC: 50 % (ref 37–47)
HDLC SERPL-MCNC: 45 MG/DL
HEMOGLOBIN: 15.9 GM/DL (ref 12–16)
IMMATURE GRANS (ABS): 0.04 THOU/MM3 (ref 0–0.07)
IMMATURE GRANULOCYTES: 0.3 %
KETONES, URINE: NEGATIVE
LDL CHOLESTEROL CALCULATED: 139 MG/DL
LEUKOCYTE ESTERASE, URINE: NEGATIVE
LYMPHOCYTES # BLD: 30.8 %
LYMPHOCYTES ABSOLUTE: 3.6 THOU/MM3 (ref 1–4.8)
MCH RBC QN AUTO: 29.6 PG (ref 26–33)
MCHC RBC AUTO-ENTMCNC: 31.8 GM/DL (ref 32.2–35.5)
MCV RBC AUTO: 92.9 FL (ref 81–99)
MICROALBUMIN UR-MCNC: < 1.2 MG/DL
MICROALBUMIN/CREAT UR-RTO: 14 MG/G (ref 0–30)
MISCELLANEOUS LAB TEST RESULT: NORMAL
MONOCYTES # BLD: 8.9 %
MONOCYTES ABSOLUTE: 1.1 THOU/MM3 (ref 0.4–1.3)
NITRITE, URINE: NEGATIVE
NUCLEATED RED BLOOD CELLS: 0 /100 WBC
PH UA: 5 (ref 5–9)
PLATELET # BLD: 276 THOU/MM3 (ref 130–400)
PMV BLD AUTO: 10.4 FL (ref 9.4–12.4)
POTASSIUM SERPL-SCNC: 4.3 MEQ/L (ref 3.5–5.2)
PROTEIN UA: NEGATIVE MG/DL
RBC # BLD: 5.38 MILL/MM3 (ref 4.2–5.4)
RBC URINE: NORMAL /HPF
RENAL EPITHELIAL, UA: NORMAL
SEG NEUTROPHILS: 56.6 %
SEGMENTED NEUTROPHILS ABSOLUTE COUNT: 6.7 THOU/MM3 (ref 1.8–7.7)
SODIUM BLD-SCNC: 140 MEQ/L (ref 135–145)
SPECIFIC GRAVITY UA: 1.01 (ref 1–1.03)
TOTAL PROTEIN: 7.6 G/DL (ref 6.1–8)
TRIGL SERPL-MCNC: 125 MG/DL (ref 0–199)
TSH SERPL DL<=0.05 MIU/L-ACNC: 1.3 UIU/ML (ref 0.4–4.2)
UROBILINOGEN, URINE: 0.2 EU/DL (ref 0–1)
VITAMIN B-12: 1921 PG/ML (ref 211–911)
WBC # BLD: 11.8 THOU/MM3 (ref 4.8–10.8)
WBC UA: NORMAL /HPF
YEAST: NORMAL

## 2021-06-22 PROCEDURE — 73030 X-RAY EXAM OF SHOULDER: CPT

## 2021-06-22 PROCEDURE — 99215 OFFICE O/P EST HI 40 MIN: CPT | Performed by: FAMILY MEDICINE

## 2021-06-22 PROCEDURE — 36415 COLL VENOUS BLD VENIPUNCTURE: CPT

## 2021-06-22 PROCEDURE — 71046 X-RAY EXAM CHEST 2 VIEWS: CPT

## 2021-06-22 PROCEDURE — 84443 ASSAY THYROID STIM HORMONE: CPT

## 2021-06-22 PROCEDURE — 80053 COMPREHEN METABOLIC PANEL: CPT

## 2021-06-22 PROCEDURE — 82607 VITAMIN B-12: CPT

## 2021-06-22 PROCEDURE — 85025 COMPLETE CBC W/AUTO DIFF WBC: CPT

## 2021-06-22 PROCEDURE — 81001 URINALYSIS AUTO W/SCOPE: CPT

## 2021-06-22 PROCEDURE — 82043 UR ALBUMIN QUANTITATIVE: CPT

## 2021-06-22 PROCEDURE — G0296 VISIT TO DETERM LDCT ELIG: HCPCS | Performed by: FAMILY MEDICINE

## 2021-06-22 PROCEDURE — 80061 LIPID PANEL: CPT

## 2021-06-22 PROCEDURE — 83036 HEMOGLOBIN GLYCOSYLATED A1C: CPT | Performed by: FAMILY MEDICINE

## 2021-06-22 RX ORDER — AMLODIPINE BESYLATE 10 MG/1
10 TABLET ORAL DAILY
Qty: 90 TABLET | Refills: 1 | Status: SHIPPED | OUTPATIENT
Start: 2021-06-22 | End: 2022-01-14 | Stop reason: SDUPTHER

## 2021-06-22 RX ORDER — CARVEDILOL 25 MG/1
25 TABLET ORAL 2 TIMES DAILY WITH MEALS
Qty: 180 TABLET | Refills: 1 | Status: SHIPPED | OUTPATIENT
Start: 2021-06-22 | End: 2022-01-14 | Stop reason: SDUPTHER

## 2021-06-22 SDOH — ECONOMIC STABILITY: FOOD INSECURITY: WITHIN THE PAST 12 MONTHS, YOU WORRIED THAT YOUR FOOD WOULD RUN OUT BEFORE YOU GOT MONEY TO BUY MORE.: PATIENT DECLINED

## 2021-06-22 SDOH — ECONOMIC STABILITY: FOOD INSECURITY: WITHIN THE PAST 12 MONTHS, THE FOOD YOU BOUGHT JUST DIDN'T LAST AND YOU DIDN'T HAVE MONEY TO GET MORE.: PATIENT DECLINED

## 2021-06-22 ASSESSMENT — SOCIAL DETERMINANTS OF HEALTH (SDOH): HOW HARD IS IT FOR YOU TO PAY FOR THE VERY BASICS LIKE FOOD, HOUSING, MEDICAL CARE, AND HEATING?: PATIENT DECLINED

## 2021-06-22 NOTE — PATIENT INSTRUCTIONS
You may receive a survey about your visit with us today. The feedback from our patients helps us identify what is working well and where the service to all patients can be enhanced. Thank you! What is lung cancer screening? Lung cancer screening is a way in which doctors check the lungs for early signs of cancer in people who have no symptoms of lung cancer. A low-dose CT scan uses much less radiation than a normal CT scan and shows a more detailed image of the lungs than a standard X-ray. The goal of lung cancer screening is to find cancer early, before it has a chance to grow, spread, or cause problems. One large study found that smokers who were screened with low-dose CT scans were less likely to die of lung cancer than those who were screened with standard X-ray. Below is a summary of the things you need to know regarding screening for lung cancer with low-dose computed tomography (LDCT). This is a screening program that involves routine annual screening with LDCT studies of the lung. The LDCTs are done using low-dose radiation that is not thought to increase your cancer risk. If you have other serious medical conditions (other cancers, congestive heart failure) that limit your life expectancy to less than 10 years, you should not undergo lung cancer screening with LDCT. The chance is 20%-60% that the LDCT result will show abnormalities. This would require additional testing which could include repeat imaging or even invasive procedures. Most (about 95%) of \"abnormal\" LDCT results are false in the sense that no lung cancer is ultimately found. Additionally, some (about 10%) of the cancers found would not affect your life expectancy, even if undetected and untreated. If you are still smoking, the single most important thing that you can do to reduce your risk of dying of lung cancer is to quit. For this screening to be covered by Medicare and most other insurers, strict criteria must be met. If you do not meet these criteria, but still wish to undergo LDCT testing, you will be required to sign a waiver indicating your willingness to pay for the scan.

## 2021-06-22 NOTE — PROGRESS NOTES
[address]  [address] [place], [address]  Ph:   [phone]  Fax: [phone]    Provider:  Dr. Emanuel De La Garza    Patient:  Alexus Lynn  YOB: 1960      Visit Date:  6/22/2021     Reason For Visit:   Chief Complaint   Patient presents with    Follow-up    Hypertension    Hyperlipidemia    Diabetes     a1c today in office    Nausea & Vomiting     increased episodes of nausea     Shoulder Pain     righ shoulder pain with increased activity     Leg Swelling     Leg swelling and numbness (feel like it goes to sleep)        Flores Mane is a 63 y.o. female who comes today to the office for follow-up of hypertension, diabetes mellitus type 2,  hyperlipidemia, and depresion. Today she complains of right shoulder pain and edema of ankles. Another conern today is chest pain off and on for the last month. She believes it is anxiety, but she is concern it may be her heart. It is a \"nagging ache\" in the left side of the chest, just in \" the heart area\" which comes 4-5 times a week and it may last few minutes, an hour or all day. She is always shot of breath, no change on it. She denies diaphoresis or claminess. He has hx of COPD and takes Anoro off and on, and Albuterol as needed. The chest pain can happen at rest when she is watching TV. In the morning she is having nausea, no vomiting. She has been having right shoulder pain for about 1 month. She has had pain in both arms for the last coupe of years. shoulder pain for about 9 months. With certain movement she can hear a grinding nose. Pain is aggravated by elevating the arms about the shoulder level. No hx of injury. She worked in a [address] for several years that required her to put \"vents\" in a higher place above her head. Hypertension:  She is taking Amlodipine 10 mg 1 tablet PO daily and Carvedilol 25 mg 1 tablet PO BID. She denies any side effects from the medications. She is not exercising.   She is not adherent to low salt diet. Her blood pressure is well controlled at home. She denies palpitations, irregular heart beat, near-syncope, orthopnea. Cardiovascular risk factors: diabetes mellitus, dyslipidemia, hypertension, obesity (BMI >= 30 kg/m2), sedentary lifestyle and smoking/ tobacco exposure. Use of agents associated with hypertension: thyroid hormones. Smoking:  She smokes 15 cigarettes a day. She started at 18-14. She has smked up to 2 PPDs at one point. Diabetes Mellitus: She is taking Januvia 100 mg 1 tablet PO daily and she denies side effects from the medicine. She denies hyperglycemia, paresthesia of the feet, polydipsia, polyuria, visual disturbances and weight loss. She does not check sugars. HgbA1c was 6.2% today. She is not going to the [facility]. She is up to date on Prevnar, Influenza [id number], hepatitis B x 3 and Pneumovax. Hyperlipidemia:  She is not taking any medication. She has tried Crestor 10 mg and Zetia but she had muscle aching. She takes fish oil OTC, olives and almonds instead. There is not a family history of hyperlipidemia. There is not a family history of early ischemic heart disease. She is adherent to low cholesterol diet. Blood test done in Novemebr 2020 showed a total cholesterol 199, triglycerides 156, HDL 42 and     Depression:  She is taking Wellbutrin  mg 1 PO daily. She could not tolerate the second dose, she got insomnia.  .        Significant Past Medical History:    Past Medical History:   Diagnosis Date    COPD [facility]chronic obstructive pulmonary disease) ([facility] [address]     Degenerative disc disease 1996    Depression 07/2016    Diverticulitis     Graves disease 2011    Hyperlipidemia 2010    Hypertension 2011    Spinal stenosis 04/2015    Type II or unspecified type diabetes mellitus without mention of complication, not stated as uncontrolled 2014    Vitamin D deficiency 2012           Allergies:  is allergic to codeine. Medications:   Current Outpatient Medications   Medication Sig Dispense Refill    SITagliptin (JANUVIA) 100 MG tablet Take 1 tablet by mouth daily 90 tablet 1    carvedilol (COREG) 25 MG tablet Take 1 tablet by mouth 2 times daily (with meals) 180 tablet 1    amLODIPine (NORVASC) 10 MG tablet Take 1 tablet by mouth daily 90 tablet 1    blood glucose monitor kit and supplies Test 1 times a day & as needed for symptoms of irregular blood glucose. 1 kit 0    gabapentin (NEURONTIN) 300 MG capsule Take 1 capsule by mouth nightly as needed (nerve pain). 90 capsule 1    albuterol sulfate HFA (PROAIR HFA) 108 (90 Base) MCG/ACT inhaler Inhale 2 puffs into the lungs every 6 hours as needed for Wheezing 1 Inhaler 11     No current facility-administered medications for this visit. Review of systems:  Review of Systems - History obtained from chart review and the patient  General ROS: negative for - chills, fatigue or fever  Psychological ROS: negative for - anxiety, depression or sleep disturbances  ENT ROS: negative for - headaches, nasal congestion or nasal discharge  Allergy and Immunology ROS: negative for - seasonal allergies  Hematological and Lymphatic ROS: negative for - bruising  Endocrine ROS: negative for - malaise/lethargy, polydipsia/polyuria or temperature intolerance  Respiratory ROS: negative for - cough,  shortness of breath or wheezing  Cardiovascular ROS: positive for - chest pain. Positive for mild edema  Gastrointestinal ROS: negative or abdominal pain, diarrhea, or constipation.     Musculoskeletal ROS: positive for - shoulder pain  Neurological ROS: negative for - dizziness  Dermatological ROS: negative for - rash    Physical Examination:  /64 (Site: Left Upper Arm, Position: Sitting, Cuff Size: Large Adult)   Pulse 69   Temp 97.8 °F (36.6 °C) (Temporal)   Resp 12   Ht 5' 2\" (1.575 m)   Wt 180 lb 9.6 oz (81.9 kg)   BMI 33.03 kg/m²     General-  Alert and oriented x 3, NAD  HEENT: NC, AT, PERRLA, EOMI, anicteric sclerae  Ears: Normal tympanic membranes bilaterally  Nose: patent, no lesions  Mouth: no lesions, moist mucosas  Neck - supple, no significant adenopathy  Chest - clear to auscultation, no wheezes, rales or rhonchi, symmetric air entry  Heart - normal rate, regular rhythm, normal S1, S2, no murmurs, rubs, clicks or gallops  Abdomen - soft, nontender, nondistended, no masses or organomegaly  Extremities - peripheral pulses normal, no pedal edema, no clubbing or cyanosis  Visual inspection:  Deformity/amputation: absent  Skin lesions/pre-ulcerative calluses: absent  Edema: right- trace, left- trace  Sensory exam:  Monofilament sensation: normal  (minimum of 5 random plantar locations tested, avoiding callused areas - > 1 area with absence of sensation is + for neuropathy)  Plus at least one of the following:  Pulses: normal,   Pinprick: Intact  Proprioception: Intact  Vibration (128 Hz): N/A    Impression:     Diagnosis Orders   1. Hypercholesterolemia  Stress test, lexiscan    Lipid Panel    TSH without Reflex   2. Type 2 diabetes mellitus without complication, without long-term current use of insulin (HCC)  SITagliptin (JANUVIA) 100 MG tablet    Stress test, lexiscan    Vitamin B12    Microalbumin / Creatinine Urine Ratio   3. Essential hypertension  carvedilol (COREG) 25 MG tablet    amLODIPine (NORVASC) 10 MG tablet    Stress test, lexiscan    CBC Auto Differential    Comprehensive Metabolic Panel    Urinalysis with Microscopic    Vitamin B12   4. Major depressive disorder with single episode, in partial remission (HCC)     5. Chest pain, unspecified type  Stress test, lexiscan    XR CHEST STANDARD (2 VW)   6. Adult BMI 33.0-33.9 kg/sq m     7.  Acute pain of right shoulder  XR SHOULDER RIGHT (MIN 2 VIEWS)   8. Personal history of tobacco use  [place] VISIT TO DISCUSS LUNG CA SCREEN W LDCT    CT Lung Screen (Annual)       Plan:  Continue Norvasc 10 mg 1 tablet PO daily  Continue Carvedilol  25 mg 1 tablet PO BID  Continue Januvia 100 mg 1 tablet PO daily  Change Wellbutrin SR to  mg 1 tablet PO daily. Orders Placed This Encounter   Medications    SITagliptin (JANUVIA) 100 MG tablet     Sig: Take 1 tablet by mouth daily     Dispense:  90 tablet     Refill:  1    carvedilol (COREG) 25 MG tablet     Sig: Take 1 tablet by mouth 2 times daily (with meals)     Dispense:  180 tablet     Refill:  1    amLODIPine (NORVASC) 10 MG tablet     Sig: Take 1 tablet by mouth daily     Dispense:  90 tablet     Refill:  1       Follow Up:  Return in about 3 weeks (around 7/13/2021). Hayes Martinez MD  Low Dose CT (LDCT) Lung Screening criteria met   Age 50-69   Pack year smoking >30   Still smoking or less than 15 year since quit   No sign or symptoms of lung cancer   > 11 months since last LDCT     Risks and benefits of lung cancer screening with LDCT scans discussed:    Significance of positive screen - False-positive LDCT results often occur. 95% of all positive results do not lead to a diagnosis of cancer. Usually further imaging can resolve most false-positive results; however, some patients may require invasive procedures. Over diagnosis risk - 10% to 12% of screen-detected lung cancer cases are over diagnosed--that is, the cancer would not have been detected in the patient's lifetime without the screening. Need for follow up screens annually to continue lung cancer screening effectiveness     Risks associated with radiation from annual LDCT- Radiation exposure is about the same as for a mammogram, which is about 1/3 of the annual background radiation exposure from everyday life. Starting screening at age 54 is not likely to increase cancer risk from radiation exposure. Patients with comorbidities resulting in life expectancy of < 10 years, or that would preclude treatment of an abnormality identified on CT, should not be screened due to lack of benefit.     To obtain maximal benefit from this screening, smoking cessation and long-term abstinence from smoking is critical

## 2021-06-23 ENCOUNTER — TELEPHONE (OUTPATIENT)
Dept: FAMILY MEDICINE CLINIC | Age: 61
End: 2021-06-23

## 2021-06-23 NOTE — TELEPHONE ENCOUNTER
Xray Shoulder      Henny Greer MD   6/22/2021  4:17 PM EDT       She has some arthritis in the shoulder.  If she decides I can refer her to see the orthopedic surgeon         Chest Stefanie WORTHY MD   6/22/2021  4:17 PM EDT       Normal X-ray results

## 2021-06-24 ENCOUNTER — TELEPHONE (OUTPATIENT)
Dept: FAMILY MEDICINE CLINIC | Age: 61
End: 2021-06-24

## 2021-06-24 NOTE — TELEPHONE ENCOUNTER
Spoke with patient regarding results. Patient does not want to proceed with referral to [facility] at this time. Patient requesting to know about lab results. Informed her that Mateus Carrero has not yet reviewed but a few things were elevated. We spoke about her b12 level. She admits to starting B12 2500 mg daily approx 1 month ago. Please advise.

## 2021-06-24 NOTE — TELEPHONE ENCOUNTER
----- Message from [facility] sent at 6/23/2021  4:24 PM EDT -----  Subject: Message to Provider    QUESTIONS  Information for Provider? Patient is returning a call from the office Dr. Jacob Rios and she do not know who called her or what its regarding. Patient   said she missed two calls from the office and not sure why they called. Please give patient a call back at [phone].  ---------------------------------------------------------------------------  --------------  CALL BACK INFO  What is the best way for the office to contact you? OK to leave message on   voicemail  Preferred Call Back Phone Number? [phone]  ---------------------------------------------------------------------------  --------------  SCRIPT ANSWERS  Relationship to Patient?  Self

## 2021-06-28 ENCOUNTER — TELEPHONE (OUTPATIENT)
Dept: FAMILY MEDICINE CLINIC | Age: 61
End: 2021-06-28

## 2021-06-28 NOTE — TELEPHONE ENCOUNTER
Patient states that she feels there is no reason for her to stop her B12 vitamins d/t to them being a water soluble vitamin that does no harm to take. Patient also states that white blood cell count is always high and would like this addressed because she thinks that she could be walking around with cancer and nobody is addressing it. Patient also states that she will address all of her concerns at her up coming visit on the 20th. I also explained that the physician would like her to repeat labs and I would be sending some to her. Patient states she did not want those labs sent to just sit in her house, she will just have labs ordered at next visit.

## 2021-06-28 NOTE — TELEPHONE ENCOUNTER
----- Message from Nguyen Brock, Nagi Be sent at 6/28/2021  9:03 AM EDT -----  Please Review   Agnieszka Miranda MD   6/27/2021  1:29 PM EDT       Most of blood test within acceptable ranges.  Vitamin B12 is very elevated.  If she is taking replacement she should hold on for couple of weeks and restart every other day.  Will recheck in 3 months again. Fish Richter his WBC count is elevated will repeat again in 3 months.  Diagnosis leukocytosis

## 2021-07-02 ENCOUNTER — HOSPITAL ENCOUNTER (OUTPATIENT)
Dept: NON INVASIVE DIAGNOSTICS | Age: 61
Discharge: HOME | End: 2021-07-02
Payer: MEDICARE

## 2021-07-02 ENCOUNTER — HOSPITAL ENCOUNTER (OUTPATIENT)
Dept: CT IMAGING | Age: 61
Discharge: HOME | End: 2021-07-02
Payer: MEDICARE

## 2021-07-02 ENCOUNTER — TELEPHONE (OUTPATIENT)
Dept: FAMILY MEDICINE CLINIC | Age: 61
End: 2021-07-02

## 2021-07-02 DIAGNOSIS — R07.9 CHEST PAIN, UNSPECIFIED TYPE: ICD-10-CM

## 2021-07-02 DIAGNOSIS — Z87.891 PERSONAL HISTORY OF TOBACCO USE: ICD-10-CM

## 2021-07-02 DIAGNOSIS — E78.00 HYPERCHOLESTEROLEMIA: ICD-10-CM

## 2021-07-02 DIAGNOSIS — I10 ESSENTIAL HYPERTENSION: ICD-10-CM

## 2021-07-02 DIAGNOSIS — E11.9 TYPE 2 DIABETES MELLITUS WITHOUT COMPLICATION, WITHOUT LONG-TERM CURRENT USE OF INSULIN (HCC): ICD-10-CM

## 2021-07-02 PROCEDURE — 71271 CT THORAX LUNG CANCER SCR C-: CPT

## 2021-07-20 ENCOUNTER — OFFICE VISIT (OUTPATIENT)
Dept: FAMILY MEDICINE CLINIC | Age: 61
End: 2021-07-20
Payer: MEDICARE

## 2021-07-20 VITALS
HEART RATE: 81 BPM | WEIGHT: 182 LBS | HEIGHT: 63 IN | BODY MASS INDEX: 32.25 KG/M2 | SYSTOLIC BLOOD PRESSURE: 97 MMHG | DIASTOLIC BLOOD PRESSURE: 71 MMHG | RESPIRATION RATE: 12 BRPM | TEMPERATURE: 97.9 F

## 2021-07-20 DIAGNOSIS — R07.9 CHEST PAIN, UNSPECIFIED TYPE: Primary | ICD-10-CM

## 2021-07-20 DIAGNOSIS — E78.00 HYPERCHOLESTEROLEMIA: ICD-10-CM

## 2021-07-20 DIAGNOSIS — I10 ESSENTIAL HYPERTENSION: ICD-10-CM

## 2021-07-20 DIAGNOSIS — F32.4 MAJOR DEPRESSIVE DISORDER WITH SINGLE EPISODE, IN PARTIAL REMISSION (HCC): ICD-10-CM

## 2021-07-20 DIAGNOSIS — E11.9 TYPE 2 DIABETES MELLITUS WITHOUT COMPLICATION, WITHOUT LONG-TERM CURRENT USE OF INSULIN (HCC): ICD-10-CM

## 2021-07-20 PROCEDURE — 99214 OFFICE O/P EST MOD 30 MIN: CPT | Performed by: FAMILY MEDICINE

## 2021-07-20 RX ORDER — ROSUVASTATIN CALCIUM 5 MG/1
5 TABLET, COATED ORAL NIGHTLY
Qty: 30 TABLET | Refills: 3 | Status: SHIPPED | OUTPATIENT
Start: 2021-07-20 | End: 2022-01-14 | Stop reason: SDUPTHER

## 2021-07-20 NOTE — PROGRESS NOTES
[address] [facility] [address] [facility], [address]  Ph:   [phone]  Fax: [phone]     Zak Schmitt MD    Patient:  David Stanton  YOB: 1960      Visit Date:  7/20/2021     Reason For Visit:   Chief Complaint   Patient presents with    Follow-up    Diabetes     A1C 6/22/2021 = 6.2    Hyperlipidemia    Hypertension    Other     Discuss results         Brit Perdomo is a 63 y.o. female who comes today to the office for follow-up of hypertension, diabetes mellitus type 2,  hyperlipidemia, and depresion. Today she complains of right shoulder pain and edema of ankles. Last visit she came to see me for chest pain that was described as nagging ache on the left side of the chest, just in \" the heart area\" which was going 4-5 times a week. She was referred for a Lexiscan stress test but did copayment was $700 she could not afford it so she canceled it. She is always short of breath, no change on it. She denies diaphoresis or claminess. She has hx of COPD and takes Anoro off and on, and Albuterol as needed. Hypertension:  She is taking Amlodipine 10 mg 1 tablet PO daily and Carvedilol 25 mg 1 tablet PO BID. She denies any side effects from the medications. She is not exercising. She is not adherent to low salt diet. Her blood pressure is well controlled at home. She denies palpitations, irregular heart beat, near-syncope, orthopnea. Cardiovascular risk factors: diabetes mellitus, dyslipidemia, hypertension, obesity (BMI >= 30 kg/m2), sedentary lifestyle and smoking/ tobacco exposure. Use of agents associated with hypertension: thyroid hormones. Smoking:  She continues smokes 15 cigarettes a day. She started at age 16-14. She has smoked up to 2 PPDs at one point.     Recent Lung CT screening showed benign appearance or behavior, will continue annual screening    Diabetes Mellitus: She is taking Januvia 100 mg 1 tablet PO daily and she denies side effects from the medicine. She does not check sugars. HgbA1c was 6.2% last visit. She is not going to the [facility]. She is up to date on Prevnar, Influenza [id number], hepatitis B x 3 and Pneumovax. She is planing to get the Covid vaccine    Hyperlipidemia:  She is not taking a statin. She has tried Crestor 10 mg and Zetia but she had muscle aching. There is not a family history of hyperlipidemia. There is not a family history of early ischemic heart disease. She is adherent to low cholesterol diet. Last lipid profile performed June 22, 2021 showed total cholesterol of 209, triglyceride 125, HDL 45 and     Depression:  She is taking Wellbutrin  mg 1 PO daily instead of twice a day due to insomnia. She feels it is working fairly. Significant Past Medical History:    Past Medical History:   Diagnosis Date    [facility] pulmonary disease) ([facility] [address]     Degenerative disc disease 1996    Depression 07/2016    Diverticulitis     Graves disease 2011    Hyperlipidemia 2010    Hypertension 2011    Spinal stenosis 04/2015    Type II or unspecified type diabetes mellitus without mention of complication, not stated as uncontrolled 2014    Vitamin D deficiency 2012           Allergies:  is allergic to codeine. Medications:   Current Outpatient Medications   Medication Sig Dispense Refill    rosuvastatin (CRESTOR) 5 MG tablet Take 1 tablet by mouth nightly 30 tablet 3    SITagliptin (JANUVIA) 100 MG tablet Take 1 tablet by mouth daily 90 tablet 1    carvedilol (COREG) 25 MG tablet Take 1 tablet by mouth 2 times daily (with meals) 180 tablet 1    amLODIPine (NORVASC) 10 MG tablet Take 1 tablet by mouth daily 90 tablet 1    blood glucose monitor kit and supplies Test 1 times a day & as needed for symptoms of irregular blood glucose. 1 kit 0    gabapentin (NEURONTIN) 300 MG capsule Take 1 capsule by mouth nightly as needed (nerve pain).  90 capsule 1    albuterol sulfate HFA (PROAIR HFA) 108 (90 Base) MCG/ACT inhaler Inhale 2 puffs into the lungs every 6 hours as needed for Wheezing 1 Inhaler 11     No current facility-administered medications for this visit. Review of systems:  Review of Systems - History obtained from chart review and the patient  General ROS: negative for - chills, fatigue or fever  Psychological ROS: negative for - anxiety, depression or sleep disturbances  ENT ROS: negative for - headaches, nasal congestion or nasal discharge  Allergy and Immunology ROS: negative for - seasonal allergies  Hematological and Lymphatic ROS: negative for - bruising  Endocrine ROS: negative for - malaise/lethargy, polydipsia/polyuria or temperature intolerance  Respiratory ROS: negative for - cough,  shortness of breath or wheezing  Cardiovascular ROS: positive for - chest pain. Positive for mild edema  Gastrointestinal ROS: negative or abdominal pain, diarrhea, or constipation. Musculoskeletal ROS: positive for - shoulder pain  Neurological ROS: negative for - dizziness  Dermatological ROS: negative for - rash    Physical Examination:  BP 97/71 (Site: Left Upper Arm, Position: Sitting, Cuff Size: Large Adult)   Pulse 81   Temp 97.9 °F (36.6 °C) (Temporal)   Resp 12   Ht 5' 2.5\" (1.588 m)   Wt 182 lb (82.6 kg)   BMI 32.76 kg/m²     General-  Alert and oriented x 3, NAD  HEENT: NC, AT, PERRLA, EOMI, anicteric sclerae  Ears: Normal tympanic membranes bilaterally  Nose: patent, no lesions  Mouth: no lesions, moist mucosas  Neck - supple, no significant adenopathy  Chest - clear to auscultation, no wheezes, rales or rhonchi, symmetric air entry  Heart - normal rate, regular rhythm, normal S1, S2, no murmurs, rubs, clicks or gallops  Abdomen - soft, nontender, nondistended, no masses or organomegaly  Extremities - peripheral pulses normal, no pedal edema, no clubbing or cyanosis    Impression:     Diagnosis Orders   1. Chest pain, unspecified type     2. Essential hypertension     3.  Type 2 diabetes mellitus without complication, without long-term current use of insulin ([facility] [address]     4. Major depressive disorder with single episode, in partial remission ([facility] [address]     5. Hypercholesterolemia         Plan:    Decrease Norvasc to 5 mg 1 tablet mouth daily  Continue Carvedilol  25 mg 1 tablet PO BID  Check blood pressure twice a day and to follow-up in 1 month. Add baby aspirin a day. We will start Crestor 5 mg 1 tablet by mouth every other day to titrate to daily. Continue Januvia 100 mg 1 tablet PO daily  Take the Wellbutrin  mg a soonest waking up on the next dose at noon   She will call the hospital and a schedule the Lexiscan stress test. The importance to have this test on having the stress with her with her strong personal history and her risk for heart attack and stroke. Again, advised to quit smoking. She states she have nicotine patches at home and she will start them again and try. Orders Placed This Encounter   Medications    rosuvastatin (CRESTOR) 5 MG tablet     Sig: Take 1 tablet by mouth nightly     Dispense:  30 tablet     Refill:  3       Follow Up:  Return in about 4 weeks (around 8/17/2021).     Adali Ramos MD

## 2021-07-28 ENCOUNTER — TELEPHONE (OUTPATIENT)
Dept: FAMILY MEDICINE CLINIC | Age: 61
End: 2021-07-28

## 2021-07-28 NOTE — TELEPHONE ENCOUNTER
Called to assist with scheduling the pt with mammogram;  mailbox not set up; unable to leave message @ 1:49 pm.

## 2021-07-29 ENCOUNTER — TELEPHONE (OUTPATIENT)
Dept: FAMILY MEDICINE CLINIC | Age: 61
End: 2021-07-29

## 2022-01-12 DIAGNOSIS — E11.9 TYPE 2 DIABETES MELLITUS WITHOUT COMPLICATION, WITHOUT LONG-TERM CURRENT USE OF INSULIN (HCC): ICD-10-CM

## 2022-01-12 NOTE — TELEPHONE ENCOUNTER
Patient called and notified. Patient states that they pharmacy is incorrect, I changed pharmacy and sent to the correct pharmacy.

## 2022-01-12 NOTE — TELEPHONE ENCOUNTER
Please approve or deny     Last Visit Date:  7/20/2021       Next Visit Date:    Visit date not found

## 2022-01-14 ENCOUNTER — OFFICE VISIT (OUTPATIENT)
Dept: FAMILY MEDICINE CLINIC | Age: 62
End: 2022-01-14
Payer: MEDICARE

## 2022-01-14 VITALS
WEIGHT: 181.6 LBS | BODY MASS INDEX: 32.18 KG/M2 | HEART RATE: 71 BPM | HEIGHT: 63 IN | SYSTOLIC BLOOD PRESSURE: 125 MMHG | DIASTOLIC BLOOD PRESSURE: 70 MMHG

## 2022-01-14 DIAGNOSIS — F17.200 CURRENT EVERY DAY SMOKER: ICD-10-CM

## 2022-01-14 DIAGNOSIS — M54.50 CHRONIC LOW BACK PAIN, UNSPECIFIED BACK PAIN LATERALITY, UNSPECIFIED WHETHER SCIATICA PRESENT: ICD-10-CM

## 2022-01-14 DIAGNOSIS — J44.9 CHRONIC OBSTRUCTIVE PULMONARY DISEASE, UNSPECIFIED COPD TYPE (HCC): ICD-10-CM

## 2022-01-14 DIAGNOSIS — F32.5 MAJOR DEPRESSIVE DISORDER WITH SINGLE EPISODE, IN FULL REMISSION (HCC): ICD-10-CM

## 2022-01-14 DIAGNOSIS — E11.9 TYPE 2 DIABETES MELLITUS WITHOUT COMPLICATION, WITHOUT LONG-TERM CURRENT USE OF INSULIN (HCC): Primary | ICD-10-CM

## 2022-01-14 DIAGNOSIS — Z23 NEED FOR VACCINATION: ICD-10-CM

## 2022-01-14 DIAGNOSIS — I10 ESSENTIAL HYPERTENSION: ICD-10-CM

## 2022-01-14 DIAGNOSIS — G89.29 CHRONIC LOW BACK PAIN, UNSPECIFIED BACK PAIN LATERALITY, UNSPECIFIED WHETHER SCIATICA PRESENT: ICD-10-CM

## 2022-01-14 LAB — HBA1C MFR BLD: 5.8 %

## 2022-01-14 PROCEDURE — 90732 PPSV23 VACC 2 YRS+ SUBQ/IM: CPT | Performed by: NURSE PRACTITIONER

## 2022-01-14 PROCEDURE — G0008 ADMIN INFLUENZA VIRUS VAC: HCPCS | Performed by: NURSE PRACTITIONER

## 2022-01-14 PROCEDURE — 99214 OFFICE O/P EST MOD 30 MIN: CPT | Performed by: NURSE PRACTITIONER

## 2022-01-14 PROCEDURE — G0009 ADMIN PNEUMOCOCCAL VACCINE: HCPCS | Performed by: NURSE PRACTITIONER

## 2022-01-14 PROCEDURE — 83036 HEMOGLOBIN GLYCOSYLATED A1C: CPT | Performed by: NURSE PRACTITIONER

## 2022-01-14 PROCEDURE — 90674 CCIIV4 VAC NO PRSV 0.5 ML IM: CPT | Performed by: NURSE PRACTITIONER

## 2022-01-14 RX ORDER — CARVEDILOL 25 MG/1
25 TABLET ORAL 2 TIMES DAILY WITH MEALS
Qty: 180 TABLET | Refills: 1 | Status: SHIPPED | OUTPATIENT
Start: 2022-01-14 | End: 2022-07-15 | Stop reason: SDUPTHER

## 2022-01-14 RX ORDER — AMLODIPINE BESYLATE 10 MG/1
10 TABLET ORAL DAILY
Qty: 90 TABLET | Refills: 1 | Status: SHIPPED | OUTPATIENT
Start: 2022-01-14 | End: 2022-07-15 | Stop reason: SDUPTHER

## 2022-01-14 RX ORDER — ROSUVASTATIN CALCIUM 5 MG/1
5 TABLET, COATED ORAL NIGHTLY
Qty: 90 TABLET | Refills: 1 | Status: SHIPPED | OUTPATIENT
Start: 2022-01-14 | End: 2022-07-15 | Stop reason: SDUPTHER

## 2022-01-14 RX ORDER — GABAPENTIN 300 MG/1
300 CAPSULE ORAL 2 TIMES DAILY
Qty: 180 CAPSULE | Refills: 1 | Status: SHIPPED | OUTPATIENT
Start: 2022-01-14 | End: 2022-07-15 | Stop reason: SDUPTHER

## 2022-01-14 RX ORDER — BLOOD-GLUCOSE METER
1 KIT MISCELLANEOUS DAILY
Qty: 1 KIT | Refills: 0 | Status: SHIPPED | OUTPATIENT
Start: 2022-01-14

## 2022-01-14 ASSESSMENT — PATIENT HEALTH QUESTIONNAIRE - PHQ9
10. IF YOU CHECKED OFF ANY PROBLEMS, HOW DIFFICULT HAVE THESE PROBLEMS MADE IT FOR YOU TO DO YOUR WORK, TAKE CARE OF THINGS AT HOME, OR GET ALONG WITH OTHER PEOPLE: 0
SUM OF ALL RESPONSES TO PHQ QUESTIONS 1-9: 0
2. FEELING DOWN, DEPRESSED OR HOPELESS: 0
7. TROUBLE CONCENTRATING ON THINGS, SUCH AS READING THE NEWSPAPER OR WATCHING TELEVISION: 0
8. MOVING OR SPEAKING SO SLOWLY THAT OTHER PEOPLE COULD HAVE NOTICED. OR THE OPPOSITE, BEING SO FIGETY OR RESTLESS THAT YOU HAVE BEEN MOVING AROUND A LOT MORE THAN USUAL: 0
SUM OF ALL RESPONSES TO PHQ QUESTIONS 1-9: 0
9. THOUGHTS THAT YOU WOULD BE BETTER OFF DEAD, OR OF HURTING YOURSELF: 0
4. FEELING TIRED OR HAVING LITTLE ENERGY: 0
SUM OF ALL RESPONSES TO PHQ9 QUESTIONS 1 & 2: 0
3. TROUBLE FALLING OR STAYING ASLEEP: 0
6. FEELING BAD ABOUT YOURSELF - OR THAT YOU ARE A FAILURE OR HAVE LET YOURSELF OR YOUR FAMILY DOWN: 0
SUM OF ALL RESPONSES TO PHQ QUESTIONS 1-9: 0
5. POOR APPETITE OR OVEREATING: 0
SUM OF ALL RESPONSES TO PHQ QUESTIONS 1-9: 0
1. LITTLE INTEREST OR PLEASURE IN DOING THINGS: 0

## 2022-01-14 NOTE — PROGRESS NOTES
Tash Alfaro (:  1960) is a 63 y.o. female,Established patient, here for evaluation of the following chief complaint(s):  Follow-up (ROUTINE/ DM) and Medication Check         ASSESSMENT/PLAN:  1. Type 2 diabetes mellitus without complication, without long-term current use of insulin (HCC)  -     POCT glycosylated hemoglobin (Hb A1C)  -     SITagliptin (JANUVIA) 100 MG tablet; Take 1 tablet by mouth daily, Disp-90 tablet, R-1Normal  -     glucose monitoring (FREESTYLE FREEDOM) kit; DAILY Starting 2022, Disp-1 kit, R-0, Normal  -     CBC Auto Differential; Future  -     Comprehensive Metabolic Panel; Future  -     Urinalysis with Microscopic; Future  -     Lipid, Fasting; Future  -      DIABETES FOOT EXAM  -     Microalbumin / Creatinine Urine Ratio; Future  2. Essential hypertension  -     amLODIPine (NORVASC) 10 MG tablet; Take 1 tablet by mouth daily, Disp-90 tablet, R-1Normal  -     carvedilol (COREG) 25 MG tablet; Take 1 tablet by mouth 2 times daily (with meals), Disp-180 tablet, R-1Normal  -     CBC Auto Differential; Future  -     Comprehensive Metabolic Panel; Future  -     Urinalysis with Microscopic; Future  -     Lipid, Fasting; Future  3. Chronic low back pain, unspecified back pain laterality, unspecified whether sciatica present  -     gabapentin (NEURONTIN) 300 MG capsule; Take 1 capsule by mouth 2 times daily for 180 days. , Disp-180 capsule, R-1Normal  -     Handicap Placard MISC; Starting 2022, Disp-1 each, R-0, Print  4. Major depressive disorder with single episode, in full remission ([address]  5. Chronic obstructive pulmonary disease, unspecified COPD type ([address]  -     Handicap Placard MISC; Starting 2022, Disp-1 each, R-0, Print  -     CBC Auto Differential; Future  -     Comprehensive Metabolic Panel; Future  -     Urinalysis with Microscopic; Future  6.  Need for vaccination  -     INFLUENZA, MDCK QUADV, 2 YRS AND OLDER, IM, PF, PREFILL SYR OR SDV, 0.5ML (FLUCELVAX QUADV, PF)  -     PNEUMOVAX 23 subcutaneous/IM (Pneumococcal polysaccharide vaccine 23-valent >= 1yo)  7. Current every day smoker  -     PNEUMOVAX 23 subcutaneous/IM (Pneumococcal polysaccharide vaccine 23-valent >= 1yo)    She is on a very fixed income, and is very concerned about cost of certain tests. We did discuss the importance of checking blood work especially kidney and liver function due to the medications that she is on. She was advised to check with the lab before she has any blood work done so she can make sure Medicare will cover it, and what her out of cost pocket may be. Continue all medicines as prescribed. We will see in 6 months for dual visit of AWV and routine with refills. Return in about 6 months (around 7/14/2022) for AWV, refills. .         Subjective   SUBJECTIVE/OBJECTIVE:  HPI    Diabetes: Januvia 100 mg daily. Not checking sugars at home due to not having a monitor, expensive. HgbA1c 5.8%. Mostly following a diabetic diet, not exercising due to back pain. No paraesthesias of feet, no wounds. Due for Pneumonia 23 influenza. She did receive her first COVID-vaccine 1 week ago. She is taking Crestor 5 mg daily. HTN: Coreg and Norvasc as prescribed. Does not add salt to much of her food. No cardiovascular symptoms. Has chronic SOB due to COPD, but at baseline. COPD: She has rescue inhaler that she only needs to use 1-2 times per week. She continues to smoke 1 ppd. Previously smoked >2 ppd. Spinal stenosis and DDD: used to follow with Dr Liz Prado. Has had cervical and lumbar spine surgeries. Lumbar area is still very painful. Taking Gabapentin 300 mg BID. Cannot walk more than 50 feet without pain. Hossein used to give her a handicap placard, but he wanted to burn nerves, and she declined, so she doesn't need to see him at this time and is asking us to give her the placard.       Refuses to get a mammogram. States she was told years ago that she would never get breast cancer. She also is trying to avoid the hospital due to [place]. Review of Systems   All other systems reviewed and are negative. Objective   Physical Exam  Vitals reviewed. Constitutional:       General: She is not in acute distress. Appearance: She is well-developed. HENT:      Head: Normocephalic. Right Ear: External ear normal.      Left Ear: External ear normal.      Nose: Nose normal.      Mouth/Throat:      Pharynx: No oropharyngeal exudate. Eyes:      Conjunctiva/sclera: Conjunctivae normal.      Pupils: Pupils are equal, round, and reactive to light. Neck:      Thyroid: No thyromegaly. Cardiovascular:      Rate and Rhythm: Normal rate and regular rhythm. Heart sounds: Normal heart sounds. No murmur heard. No friction rub. No gallop. Pulmonary:      Effort: Pulmonary effort is normal. No respiratory distress. Breath sounds: Normal breath sounds. No wheezing or rales. Abdominal:      General: There is no distension. Palpations: Abdomen is soft. There is no mass. Tenderness: There is no abdominal tenderness. There is no guarding. Musculoskeletal:         General: Normal range of motion. Cervical back: Normal range of motion and neck supple. Feet:      Comments: Visual inspection:  Deformity/amputation: absent  Skin lesions/pre-ulcerative calluses: absent  Edema: right- negative, left- negative    Sensory exam:  Monofilament sensation: normal  (minimum of 5 random plantar locations tested, avoiding callused areas - > 1 area with absence of sensation is + for neuropathy)    Plus at least one of the following:  Pulses: normal,   Pinprick: Intact  Proprioception: Intact  Vibration (128 Hz): N/A  Lymphadenopathy:      Cervical: No cervical adenopathy. Skin:     General: Skin is warm and dry. Capillary Refill: Capillary refill takes less than 2 seconds.    Neurological:      Mental Status: She is alert and oriented to person, place, and time. Psychiatric:         Behavior: Behavior normal.         Thought Content: Thought content normal.         Judgment: Judgment normal.            An electronic signature was used to authenticate this note.     --Orlin Gallo, FRANCISCO - CNP

## 2022-01-14 NOTE — PROGRESS NOTES
Immunizations Administered     Name Date Dose Route    Influenza, MDCK Quadv, IM, PF (Flucelvax 2 yrs and older) 1/14/2022 0.5 mL Intramuscular    Site: Deltoid- Left    Lot: [id number]    NDC: 55693-399-15    Pneumococcal Polysaccharide ([id number]) 1/14/2022 0.5 mL Intramuscular    Site: Deltoid- Right    Lot: [id number]    NDC: 2870-3469-88      Patient tolerated well. No adverse reaction noted at this time.

## 2022-05-10 DIAGNOSIS — Z12.31 BREAST CANCER SCREENING BY MAMMOGRAM: Primary | ICD-10-CM

## 2022-05-24 ENCOUNTER — TELEPHONE (OUTPATIENT)
Dept: FAMILY MEDICINE CLINIC | Age: 62
End: 2022-05-24

## 2022-05-24 DIAGNOSIS — J44.9 CHRONIC OBSTRUCTIVE PULMONARY DISEASE, UNSPECIFIED COPD TYPE (HCC): Primary | ICD-10-CM

## 2022-05-24 DIAGNOSIS — Z86.39 HISTORY OF GRAVES' DISEASE: ICD-10-CM

## 2022-05-24 NOTE — TELEPHONE ENCOUNTER
She is a former patient of Dr. Karie Dos Santos. Miguel Angel to refer back to him. Diagnosis history of Graves' disease. She also has COPD used to be seen at the pulmonary office miguel angel to refer her back. She is currently a checks x-ray and pulmonary function test before the appointment.

## 2022-05-24 NOTE — TELEPHONE ENCOUNTER
Yfn [facility] \[facility] [place] [facility]\""lilliana Anderson Annmarie [facility] Clinical Staff  Subject: Referral Request     QUESTIONS   Reason for referral request? Patient is requesting a referral for   Endocrinology to have her thyroid looked at again and to have her graves   disease monitored. Pt is having some concerns about that and wants to go   back. Pts previous endocrinologist is retired. Has the physician seen you for this condition before? Yes   Select a date? 2015-08-13   Select the Provider the patient wants to be referred to, if known (PCP or   Specialist)? Outside Physician - Unknown   Preferred Specialist (if applicable)? Do you already have an appointment scheduled? No   Additional Information for Provider? Pt would like a call once this   referral is placed as well, thank you!   ---------------------------------------------------------------------------   --------------   CALL BACK INFO   What is the best way for the office to contact you? Do not leave any   message, patient will call back for answer   Preferred Call Back Phone Number? [phone]   ---------------------------------------------------------------------------   --------------   SCRIPT ANSWERS   Relationship to Patient?  Self

## 2022-05-24 NOTE — TELEPHONE ENCOUNTER
----- Message from Malik Savage sent at 5/24/2022  1:44 PM EDT -----  Subject: Referral Request    QUESTIONS   Reason for referral request? Pt is requesting to see Dr. Kwasi Dudley for her COPD   as she feels she is starting to get worse. Has the physician seen you for this condition before? No   Preferred Specialist (if applicable)? Do you already have an appointment scheduled? No  Additional Information for Provider? Pt states she saw Dr. Kwasi Dudley about 3   years ago regarding her COPD. If there are any questions, pt would like a   call back please. Thank you! Pt would like a call once that referral is   sent over to Dr. Pauline Rodriguez office please. Thank you!   ---------------------------------------------------------------------------  --------------  CALL BACK INFO  What is the best way for the office to contact you? Do not leave any   message, patient will call back for answer  Preferred Call Back Phone Number? [phone]  ---------------------------------------------------------------------------  --------------  SCRIPT ANSWERS  Relationship to Patient?  Self

## 2022-05-24 NOTE — TELEPHONE ENCOUNTER
Called and spoke with the patient. The patient is requesting to have a new referral to pulmonary and endocrinology. Patient hasn't seen the providers providers in several years due to insurance coverage. Advised the patient she would need an appointment for documentation and for referrals to be placed. She is scheduled to come in 7/15/2022 but doesn't understand why she needs another appointment.    Requested for me to send message to  to review--- please advise

## 2022-05-25 ENCOUNTER — HOSPITAL ENCOUNTER (OUTPATIENT)
Dept: GENERAL RADIOLOGY | Age: 62
Discharge: HOME | End: 2022-05-25
Payer: MEDICARE

## 2022-05-25 ENCOUNTER — HOSPITAL ENCOUNTER (OUTPATIENT)
Age: 62
Discharge: HOME | End: 2022-05-25
Payer: MEDICARE

## 2022-05-25 DIAGNOSIS — J44.9 CHRONIC OBSTRUCTIVE PULMONARY DISEASE, UNSPECIFIED COPD TYPE (HCC): ICD-10-CM

## 2022-05-25 PROCEDURE — 71046 X-RAY EXAM CHEST 2 VIEWS: CPT

## 2022-06-01 ENCOUNTER — HOSPITAL ENCOUNTER (OUTPATIENT)
Dept: PULMONOLOGY | Age: 62
Discharge: HOME | End: 2022-06-01
Payer: MEDICARE

## 2022-06-01 DIAGNOSIS — J44.9 CHRONIC OBSTRUCTIVE PULMONARY DISEASE, UNSPECIFIED COPD TYPE (HCC): ICD-10-CM

## 2022-06-01 PROCEDURE — 94060 EVALUATION OF WHEEZING: CPT

## 2022-06-02 ENCOUNTER — TELEPHONE (OUTPATIENT)
Dept: FAMILY MEDICINE CLINIC | Age: 62
End: 2022-06-02

## 2022-06-02 NOTE — TELEPHONE ENCOUNTER
Patient called back explaining that Pulmonology could not get her an appointment until 8/3. Patient upset and would like a referral placed to another provider. Patient verbally inappropriate on the phone, despite my many attempts to explain that I was here to help. Patient states that she would like a referral to any pulmonologist. I offered to send a referral to [facility] d/t them having multiple providers that would be able to get her in sooner. Patient yelled on the phone \"Nevermind, you dont care\". Then hung up the phone.

## 2022-06-02 NOTE — TELEPHONE ENCOUNTER
Patient also upset that no one from Pulmonolgy has contacted patient yet to schedule an appointment. Multiple attempts to contact that office was made with no contact made. Contact was made with office, they will call patient to schedule.

## 2022-06-03 DIAGNOSIS — J44.9 CHRONIC OBSTRUCTIVE PULMONARY DISEASE, UNSPECIFIED COPD TYPE (HCC): Primary | ICD-10-CM

## 2022-06-03 RX ORDER — BUDESONIDE, GLYCOPYRROLATE, AND FORMOTEROL FUMARATE 160; 9; 4.8 UG/1; UG/1; UG/1
2 AEROSOL, METERED RESPIRATORY (INHALATION) 2 TIMES DAILY
Qty: 1 EACH | Refills: 3 | Status: SHIPPED | OUTPATIENT
Start: 2022-06-03 | End: 2022-08-03 | Stop reason: SINTOL

## 2022-06-06 NOTE — TELEPHONE ENCOUNTER
I placed a result note for the spirometry results. We can start maintenance inhaler until seen by pulmonology (ordered [facility] and can offer samples). Also offered to refer patient to pulmonology at [facility] if they can get her in sooner, as she was frustrated with the wait time for [facility].

## 2022-06-07 ENCOUNTER — TELEPHONE (OUTPATIENT)
Dept: FAMILY MEDICINE CLINIC | Age: 62
End: 2022-06-07

## 2022-06-07 NOTE — TELEPHONE ENCOUNTER
Patient states at this time she is doing ok. She will wait for her appointment with Dr. Ana Sahu. She did  the inhaler from the pharmacy, but states she has not had to use it. Advised patient to call if she needed any further assistance.

## 2022-06-07 NOTE — TELEPHONE ENCOUNTER
----- Message from FRANCISCO Morgan CNP sent at 6/3/2022  8:47 AM EDT -----  Her spirometry is consistent with severe obstructive disease. There is a pulmonary office at [facility] if she would like to try to get in there sooner. I can start her on a daily maintenance inhaler to help control her symptoms until she can see a pulmonologist. Will order Breztri to start, and patient can  some samples and copay card in our office. 2 puffs 2 times daily, rinse mouth afterwards as it contains an inhaled steroid. She can still use her Albuterol inhaler as a rescue medication for acute symptoms.

## 2022-07-15 ENCOUNTER — OFFICE VISIT (OUTPATIENT)
Dept: FAMILY MEDICINE CLINIC | Age: 62
End: 2022-07-15
Payer: MEDICARE

## 2022-07-15 VITALS
HEART RATE: 71 BPM | RESPIRATION RATE: 14 BRPM | WEIGHT: 177 LBS | TEMPERATURE: 97.9 F | BODY MASS INDEX: 31.36 KG/M2 | HEIGHT: 63 IN | SYSTOLIC BLOOD PRESSURE: 115 MMHG | DIASTOLIC BLOOD PRESSURE: 76 MMHG

## 2022-07-15 DIAGNOSIS — E78.00 HYPERCHOLESTEROLEMIA: ICD-10-CM

## 2022-07-15 DIAGNOSIS — R19.5 MUCOUS IN STOOLS: ICD-10-CM

## 2022-07-15 DIAGNOSIS — G89.29 CHRONIC LOW BACK PAIN, UNSPECIFIED BACK PAIN LATERALITY, UNSPECIFIED WHETHER SCIATICA PRESENT: ICD-10-CM

## 2022-07-15 DIAGNOSIS — R25.2 MUSCLE CRAMP, NOCTURNAL: ICD-10-CM

## 2022-07-15 DIAGNOSIS — E11.9 TYPE 2 DIABETES MELLITUS WITHOUT COMPLICATION, WITHOUT LONG-TERM CURRENT USE OF INSULIN (HCC): Primary | ICD-10-CM

## 2022-07-15 DIAGNOSIS — R10.9 ABDOMINAL BLOATING WITH CRAMPS: ICD-10-CM

## 2022-07-15 DIAGNOSIS — K57.90 DIVERTICULOSIS: ICD-10-CM

## 2022-07-15 DIAGNOSIS — E55.9 VITAMIN D DEFICIENCY: ICD-10-CM

## 2022-07-15 DIAGNOSIS — Z86.39 HISTORY OF GRAVES' DISEASE: ICD-10-CM

## 2022-07-15 DIAGNOSIS — I10 ESSENTIAL HYPERTENSION: ICD-10-CM

## 2022-07-15 DIAGNOSIS — J44.9 CHRONIC OBSTRUCTIVE PULMONARY DISEASE, UNSPECIFIED COPD TYPE (HCC): ICD-10-CM

## 2022-07-15 DIAGNOSIS — M54.50 CHRONIC LOW BACK PAIN, UNSPECIFIED BACK PAIN LATERALITY, UNSPECIFIED WHETHER SCIATICA PRESENT: ICD-10-CM

## 2022-07-15 DIAGNOSIS — R14.0 ABDOMINAL BLOATING WITH CRAMPS: ICD-10-CM

## 2022-07-15 LAB — HBA1C MFR BLD: 6.1 %

## 2022-07-15 PROCEDURE — 83036 HEMOGLOBIN GLYCOSYLATED A1C: CPT | Performed by: NURSE PRACTITIONER

## 2022-07-15 PROCEDURE — 99214 OFFICE O/P EST MOD 30 MIN: CPT | Performed by: NURSE PRACTITIONER

## 2022-07-15 PROCEDURE — 3044F HG A1C LEVEL LT 7.0%: CPT | Performed by: NURSE PRACTITIONER

## 2022-07-15 RX ORDER — BLOOD-GLUCOSE METER
1 KIT MISCELLANEOUS DAILY
Qty: 100 EACH | Refills: 3 | Status: SHIPPED | OUTPATIENT
Start: 2022-07-15

## 2022-07-15 RX ORDER — GABAPENTIN 300 MG/1
300 CAPSULE ORAL 2 TIMES DAILY
Qty: 180 CAPSULE | Refills: 1 | Status: SHIPPED | OUTPATIENT
Start: 2022-07-15 | End: 2023-01-11

## 2022-07-15 RX ORDER — CARVEDILOL 25 MG/1
25 TABLET ORAL 2 TIMES DAILY WITH MEALS
Qty: 180 TABLET | Refills: 1 | Status: SHIPPED | OUTPATIENT
Start: 2022-07-15

## 2022-07-15 RX ORDER — AMLODIPINE BESYLATE 10 MG/1
10 TABLET ORAL DAILY
Qty: 90 TABLET | Refills: 1 | Status: SHIPPED | OUTPATIENT
Start: 2022-07-15

## 2022-07-15 RX ORDER — ROSUVASTATIN CALCIUM 5 MG/1
5 TABLET, COATED ORAL NIGHTLY
Qty: 90 TABLET | Refills: 1 | Status: SHIPPED | OUTPATIENT
Start: 2022-07-15 | End: 2022-07-26

## 2022-07-15 SDOH — ECONOMIC STABILITY: FOOD INSECURITY: WITHIN THE PAST 12 MONTHS, THE FOOD YOU BOUGHT JUST DIDN'T LAST AND YOU DIDN'T HAVE MONEY TO GET MORE.: NEVER TRUE

## 2022-07-15 SDOH — ECONOMIC STABILITY: FOOD INSECURITY: WITHIN THE PAST 12 MONTHS, YOU WORRIED THAT YOUR FOOD WOULD RUN OUT BEFORE YOU GOT MONEY TO BUY MORE.: NEVER TRUE

## 2022-07-15 ASSESSMENT — ENCOUNTER SYMPTOMS
BLOOD IN STOOL: 1
CONSTIPATION: 1
DIARRHEA: 1
SHORTNESS OF BREATH: 1
BACK PAIN: 1
ABDOMINAL PAIN: 1

## 2022-07-15 ASSESSMENT — SOCIAL DETERMINANTS OF HEALTH (SDOH): HOW HARD IS IT FOR YOU TO PAY FOR THE VERY BASICS LIKE FOOD, HOUSING, MEDICAL CARE, AND HEATING?: NOT HARD AT ALL

## 2022-07-15 NOTE — PROGRESS NOTES
Andrew Barr (:  1960) is a 63 y.o. female,Established patient, here for evaluation of the following chief complaint(s):  Follow-up, Medication Refill, and Diabetes         ASSESSMENT/PLAN:  1. Type 2 diabetes mellitus without complication, without long-term current use of insulin (HCC)  -     SITagliptin (JANUVIA) 100 MG tablet; Take 1 tablet by mouth in the morning., Disp-90 tablet, R-1Normal  -     blood glucose test strips (FREESTYLE LITE) strip; 1 each by In Vitro route daily As needed. , Disp-100 each, R-3Normal  -     Magnesium; Future  -     TSH With Reflex Ft4; Future  -     Vitamin D 25 Hydroxy; Future  -     Vitamin B12; Future  2. Essential hypertension  -     amLODIPine (NORVASC) 10 MG tablet; Take 1 tablet by mouth in the morning., Disp-90 tablet, R-1Normal  -     carvedilol (COREG) 25 MG tablet; Take 1 tablet by mouth in the morning and 1 tablet in the evening. Take with meals. , Disp-180 tablet, R-1Normal  -     Magnesium; Future  -     TSH With Reflex Ft4; Future  -     Vitamin D 25 Hydroxy; Future  -     Vitamin B12; Future  3. Hypercholesterolemia  -     SITagliptin (JANUVIA) 100 MG tablet; Take 1 tablet by mouth in the morning., Disp-90 tablet, R-1Normal  -     Magnesium; Future  -     TSH With Reflex Ft4; Future  -     Vitamin D 25 Hydroxy; Future  -     Vitamin B12; Future  4. History of Graves' disease  5. Chronic low back pain, unspecified back pain laterality, unspecified whether sciatica present  -     gabapentin (NEURONTIN) 300 MG capsule; Take 1 capsule by mouth in the morning and 1 capsule before bedtime. Do all this for 180 days. , Disp-180 capsule, R-1Normal  6. Muscle cramp, nocturnal  -     Magnesium; Future  -     TSH With Reflex Ft4; Future  -     Vitamin D 25 Hydroxy; Future  -     Vitamin B12; Future  7. Diverticulosis  8. Abdominal bloating with cramps  -     GERMAN Nichole MD, Gastroenterology, [facility]  9.  Mucous in stools  -     GERMAN Nichole MD, Gastroenterology, [facility]. Chronic obstructive pulmonary disease, unspecified COPD type [address] [address]  11. Vitamin D deficiency  -     Vitamin D 25 Hydroxy; Future    Diabetes controlled, continue Januvia 100 mg daily. Blood pressure at goal continue Norvasc 10 mg daily and Coreg 25 mg twice daily. Due for lipid recheck, but will continue Crestor 5 mg nightly at this time. Continue Neurontin 300 mg 1 tablet twice daily for spinal stenosis and DDD. Fasting lab work and urine test.  Refer to Dr. Deyanira Banks for further GI evaluation and management. If electrolytes within normal ranges, will consider low-dose muscle relaxer to aid with nocturnal muscle cramps    Patient declines Medicare AWV  Patient declines mammogram.  Discussed importance of screenings for breast cancer. Return in about 6 months (around 1/15/2023) for routine follow up. .         Subjective   SUBJECTIVE/OBJECTIVE:  HPI    Diabetes: Januvia 100 mg daily. HgbA1c is 6.1%. Not really following a diabetic diet. She got a new glucometer, but needs help to get the time and date set. She usually checks 3 times per day. She is not up to date with her ophthalmologist, just now got her \"good insurance\" back, so she will make an appt for this year     HTN: Norvasc and Coreg. Does not add salt to her food. No cardiac symptoms. Takes Crestor 5 mg daily. Graves Disease: has appt with Dr Dayan Dahl 7/26    Neurontin three times per day for spinal stenosis and DDD. She usesd to see Dr Roberto Zendejas. Has had cervical and lumbar spine surgeries. Lumbar area is still very painful. She gets frequent muscle cramping, worse when she lays down. She is trying to increase her water intake. She used to take Cyclobenzaprine. She reports she has chronic C-Diff and diverticulosis, which really affects her quality of life. She has frequent loose stools, sometimes with mucous and blood. Maybe once weekly has a \"normal\" BM.  She is in the bathroom around 40 times per day because she is frequently expeling mucous and \"water\" from her rectum. Previously seen by Dr Ryan Wright, last time was a few years ago. She would like to see Dr Tawana Stewart. COPD: Breztri; worked in the beginning, but then felt like it \"put too much moisture in the lungs, and the left one hurt bad\". She was coughing a lot of clear phlegm. She had a similar reaction to Anoro. She has since stopped it and is still doing well. Has rescue inhaler, using about once per day, late afternoon. She thinks she should do it more, but is afraid of the shakiness side effect. Has appt with pulmonary 8/3/22. Review of Systems   Respiratory:  Positive for shortness of breath (with activity). Gastrointestinal:  Positive for abdominal pain, blood in stool, constipation and diarrhea. Musculoskeletal:  Positive for back pain. Muscle cramping   All other systems reviewed and are negative. Objective   Physical Exam  Vitals reviewed. Constitutional:       General: She is not in acute distress. Appearance: She is well-developed. HENT:      Head: Normocephalic. Right Ear: External ear normal.      Left Ear: External ear normal.      Nose: Nose normal.      Mouth/Throat:      Pharynx: No oropharyngeal exudate. Eyes:      Conjunctiva/sclera: Conjunctivae normal.      Pupils: Pupils are equal, round, and reactive to light. Neck:      Thyroid: No thyromegaly. Cardiovascular:      Rate and Rhythm: Normal rate and regular rhythm. Heart sounds: Normal heart sounds. No murmur heard. No friction rub. No gallop. Pulmonary:      Effort: Pulmonary effort is normal. No respiratory distress. Breath sounds: Normal breath sounds. Decreased air movement present. No wheezing or rales. Abdominal:      General: There is no distension. Palpations: Abdomen is soft. There is no mass. Tenderness: There is abdominal tenderness in the left upper quadrant. There is no guarding.    Musculoskeletal: General: Normal range of motion. Cervical back: Normal range of motion and neck supple. Lymphadenopathy:      Cervical: No cervical adenopathy. Skin:     General: Skin is warm and dry. Capillary Refill: Capillary refill takes less than 2 seconds. Neurological:      Mental Status: She is alert and oriented to person, place, and time. Psychiatric:         Behavior: Behavior normal.         Thought Content: Thought content normal.         Judgment: Judgment normal.                An electronic signature was used to authenticate this note.     --Consuelo Giang, FRANCISCO - CNP

## 2022-07-15 NOTE — PROGRESS NOTES
Subjective:      Patient ID: Jasson Pearce is a 63 y.o. female. HPI    Diabetes: Januvia 100 mg daily. HgbA1c is 6.1%. Not really following a diabetic diet. She got a new glucometer, but needs help to get the time and date set. She usually checks 3 times per day. She is not up to date with her ophthalmologist, just now got her \"good insurance\" back, so she will make an appt for this year     HTN: Norvasc and Coreg. Does not add salt to her food. No cardiac symptoms. Takes Crestor 5 mg daily. Graves Disease: has appt with Dr Bran Lara 7/26    Neurontin three times per day for spinal stenosis and DDD. She usesd to see Dr Dorian Walsh. Has had cervical and lumbar spine surgeries. Lumbar area is still very painful. She gets frequent muscle cramping, worse when she lays down. She is trying to increase her water intake. She used to take Cyclobenzaprine. She reports she has chronic C-Diff and diverticulosis, which really affects her quality of life. She has frequent loose stools, sometimes with mucous and blood. Maybe once weekly has a \"normal\" BM. She is in the bathroom around 40 times per day because she is frequently expeling mucous and \"water\" from her rectum. Previously seen by Dr Tanya Currie, last time was a few years ago. She would like to see Dr Nataly Ocasio. COPD: Breztri; worked in the beginning, but then felt like it \"put too much moisture in the lungs, and the left one hurt bad\". She was coughing a lot of clear phlegm. She had a similar reaction to Anoro. She has since stopped it and is still doing well. Has rescue inhaler, using about once per day, late afternoon. She thinks she should do it more, but is afraid of the shakiness side effect. Has appt with pulmonary 8/3/22.      Review of Systems    Objective:   Physical Exam    Assessment:      ***      Plan:      ***        Maggie Mike, FRANCISCO - CNP

## 2022-07-15 NOTE — PATIENT INSTRUCTIONS
You may receive a survey about your visit with us today. The feedback from our patients helps us identify what is working well and where the service to all patients can be enhanced. Thank you! Fasting lab work, urine test  If electrolytes are normal, will plan for a low dose muscle relaxer to take at night. Keep appts with specialist coming up. Make eye appt when you get other doctors caught up, need yearly diabetic exam.   Will refer to Dr Stephanie Quiroz for GI conditions.

## 2022-07-24 LAB
ABSOLUTE BASO #: 0.11 K/UL (ref 0–0.2)
ABSOLUTE EOS #: 0.37 K/UL (ref 0–0.5)
ABSOLUTE LYMPH #: 2.88 K/UL (ref 1–4)
ABSOLUTE MONO #: 1.1 K/UL (ref 0.2–1)
ABSOLUTE NEUT #: 5.74 K/UL (ref 1.5–7.5)
ALBUMIN SERPL-MCNC: 4.9 G/DL (ref 3.5–5.2)
ALK PHOSPHATASE: 125 U/L (ref 40–136)
ALT SERPL-CCNC: 17 U/L (ref 5–40)
ANION GAP SERPL CALCULATED.3IONS-SCNC: 12 MEQ/L (ref 10–19)
AST SERPL-CCNC: 17 U/L (ref 9–40)
BASOPHILS RELATIVE PERCENT: 1.1 %
BILIRUB SERPL-MCNC: 0.3 MG/DL
BUN BLDV-MCNC: 8 MG/DL (ref 8–23)
CALCIUM SERPL-MCNC: 9.7 MG/DL (ref 8.5–10.5)
CHLORIDE BLD-SCNC: 101 MEQ/L (ref 95–107)
CHOLESTEROL/HDL RATIO: 4.8 RATIO
CHOLESTEROL: 212 MG/DL
CO2: 29 MEQ/L (ref 19–31)
CREAT SERPL-MCNC: 0.72 MG/DL (ref 0.6–1.3)
CREATINE, URINE: 127.1 MG/DL
EGFR IF NONAFRICAN AMERICAN: 96 ML/MIN/1.73
EOSINOPHILS RELATIVE PERCENT: 3.6 %
GLUCOSE: 85 MG/DL (ref 70–99)
HCT VFR BLD CALC: 48.2 % (ref 34–45)
HDLC SERPL-MCNC: 44 MG/DL
HEMOGLOBIN: 15.8 G/DL (ref 11.5–15.5)
LDL CHOLESTEROL CALCULATED: 147 MG/DL
LDL/HDL RATIO: 3.3 RATIO
LYMPHOCYTE %: 28.2 %
MAGNESIUM: 2.1 MG/DL (ref 1.6–2.6)
MCH RBC QN AUTO: 29 PG (ref 25–33)
MCHC RBC AUTO-ENTMCNC: 32.8 G/DL (ref 31–36)
MCV RBC AUTO: 88.4 FL (ref 80–99)
MICROALBUMIN/CREAT 24H UR: <12 MG/DL
MONOCYTES # BLD: 10.8 %
NEUTROPHILS RELATIVE PERCENT: 56 %
PDW BLD-RTO: 12.6 % (ref 11.5–15)
PLATELETS: 306 K/UL (ref 130–400)
PMV BLD AUTO: 10.4 FL (ref 9.3–13)
POTASSIUM SERPL-SCNC: 4.3 MEQ/L (ref 3.5–5.4)
RBC: 5.45 M/UL (ref 3.8–5.4)
SODIUM BLD-SCNC: 142 MEQ/L (ref 133–146)
TOTAL PROTEIN: 7.2 G/DL (ref 6.1–8.3)
TRIGL SERPL-MCNC: 106 MG/DL
VITAMIN B-12: 575 PG/ML (ref 200–950)
VLDLC SERPL CALC-MCNC: 21 MG/DL
WBC: 10.2 K/UL (ref 3.5–11)

## 2022-07-25 LAB
APPEARANCE: CLEAR
BACTERIA: ABNORMAL PER HPF
BILIRUBIN: NEGATIVE
COLOR: YELLOW
CRYSTALS: PRESENT
EPITHELIAL CELLS: ABNORMAL PER HPF (ref 0–10)
GLUCOSE BLD-MCNC: NEGATIVE MG/DL
HYALINE CASTS: ABNORMAL
KETONES, URINE: NEGATIVE
LEUKOCYTE ESTERASE, URINE: NEGATIVE
NITRITE, URINE: NEGATIVE
OCCULT BLOOD,URINE: NEGATIVE
PH: 6.5 (ref 5–9)
PROTEIN, URINE: NEGATIVE
RBC: ABNORMAL PER HPF (ref 0–5)
SP GRAVITY MISCELLANEOUS: 1.02 (ref 1–1.03)
UROBILINOGEN, URINE: NORMAL
VITAMIN D 25-HYDROXY: 31 NG/ML
WBC: ABNORMAL PER HPF (ref 0–5)

## 2022-07-26 ENCOUNTER — TELEPHONE (OUTPATIENT)
Dept: FAMILY MEDICINE CLINIC | Age: 62
End: 2022-07-26

## 2022-08-03 ENCOUNTER — OFFICE VISIT (OUTPATIENT)
Dept: PULMONOLOGY | Age: 62
End: 2022-08-03
Payer: MEDICARE

## 2022-08-03 VITALS
TEMPERATURE: 98 F | SYSTOLIC BLOOD PRESSURE: 120 MMHG | WEIGHT: 176 LBS | DIASTOLIC BLOOD PRESSURE: 76 MMHG | BODY MASS INDEX: 31.18 KG/M2 | HEIGHT: 63 IN | HEART RATE: 67 BPM | OXYGEN SATURATION: 96 %

## 2022-08-03 DIAGNOSIS — J44.9 STAGE 3 SEVERE COPD BY GOLD CLASSIFICATION (HCC): Primary | ICD-10-CM

## 2022-08-03 DIAGNOSIS — J96.11 CHRONIC RESPIRATORY FAILURE WITH HYPOXIA (HCC): ICD-10-CM

## 2022-08-03 DIAGNOSIS — F17.210 CIGARETTE SMOKER MOTIVATED TO QUIT: ICD-10-CM

## 2022-08-03 PROCEDURE — G8417 CALC BMI ABV UP PARAM F/U: HCPCS | Performed by: INTERNAL MEDICINE

## 2022-08-03 PROCEDURE — 3017F COLORECTAL CA SCREEN DOC REV: CPT | Performed by: INTERNAL MEDICINE

## 2022-08-03 PROCEDURE — 3023F SPIROM DOC REV: CPT | Performed by: INTERNAL MEDICINE

## 2022-08-03 PROCEDURE — G8427 DOCREV CUR MEDS BY ELIG CLIN: HCPCS | Performed by: INTERNAL MEDICINE

## 2022-08-03 PROCEDURE — 4004F PT TOBACCO SCREEN RCVD TLK: CPT | Performed by: INTERNAL MEDICINE

## 2022-08-03 PROCEDURE — 99205 OFFICE O/P NEW HI 60 MIN: CPT | Performed by: INTERNAL MEDICINE

## 2022-08-03 RX ORDER — FLUTICASONE FUROATE, UMECLIDINIUM BROMIDE AND VILANTEROL TRIFENATATE 100; 62.5; 25 UG/1; UG/1; UG/1
1 POWDER RESPIRATORY (INHALATION) DAILY
Qty: 1 EACH | Refills: 1 | Status: SHIPPED | OUTPATIENT
Start: 2022-08-03

## 2022-08-03 ASSESSMENT — ENCOUNTER SYMPTOMS
ABDOMINAL DISTENTION: 0
WHEEZING: 1
CHOKING: 0
CHEST TIGHTNESS: 0
SHORTNESS OF BREATH: 1

## 2022-08-03 NOTE — PROGRESS NOTES
Subjective:      Patient ID: Urmila Busby is a 63 y.o. female.     HPI      All other systems reviewed and are negative    Lung Nodule Screening     [] Qualifies    [x] Does not qualify   [] Declined   [] Completed  Past Medical History:   Diagnosis Date    COPD (chronic obstructive pulmonary disease) ([facility] [address]     Degenerative disc disease 1996    Depression 07/2016    Diverticulitis     Graves disease 2011    Hyperlipidemia 2010    Hypertension 2011    Spinal stenosis 04/2015    Type II or unspecified type diabetes mellitus without mention of complication, not stated as uncontrolled 2014    Vitamin D deficiency 2012     Past Surgical History:   Procedure Laterality Date    [address]    COLONOSCOPY  2014, 2018    [facility]    HYSTERECTOMY (CERVIX STATUS UNKNOWN)  1995    complete, bleeding    NECK SURGERY  1996    OTHER SURGICAL HISTORY  1 Dec 2015     Bilateral L4/5 Laminectomies, microdiskectomies (Dr. Edinson Trimble)     Social History     Tobacco Use    Smoking status: Every Day     Packs/day: 1.00     Years: 42.00     Pack years: 42.00     Types: Cigarettes     Start date: 5/25/1979    Smokeless tobacco: Never    Tobacco comments:     pt smokes 22-23 cigs a day    Substance Use Topics    Alcohol use: Not Currently     Alcohol/week: 1.0 standard drink     Types: 1 Shots of liquor per week    Drug use: No      Allergies   Allergen Reactions    Codeine      Vomiting      Family History   Problem Relation Age of Onset    Cancer Mother 67        Pancreatic Cancer    COPD Father     Diabetes Father     Diabetes Brother 27    Other Maternal Grandfather         Pneumonia    Rheum Arthritis Maternal Grandfather     Diabetes Paternal Grandmother     Other Paternal Grandmother         Enlarged Heart    Heart Disease Paternal Grandfather     Other Paternal Grandfather         Aorta Ruptured during surgery    Colon Cancer Neg Hx      Current Outpatient Medications   Medication Sig Dispense Refill    rosuvastatin (CRESTOR) 10 MG tablet Take 1 tablet by mouth nightly 30 tablet 3    gabapentin (NEURONTIN) 300 MG capsule Take 1 capsule by mouth in the morning and 1 capsule before bedtime. Do all this for 180 days. 180 capsule 1    amLODIPine (NORVASC) 10 MG tablet Take 1 tablet by mouth in the morning. 90 tablet 1    carvedilol (COREG) 25 MG tablet Take 1 tablet by mouth in the morning and 1 tablet in the evening. Take with meals. 180 tablet 1    SITagliptin (JANUVIA) 100 MG tablet Take 1 tablet by mouth in the morning. 90 tablet 1    blood glucose test strips (FREESTYLE LITE) strip 1 each by In Vitro route daily As needed. 100 each 3    Budeson-Glycopyrrol-Formoterol (BREZTRI AEROSPHERE) 160-9-4.8 MCG/ACT AERO Inhale 2 puffs into the lungs 2 times daily 1 each 3    glucose monitoring (FREESTYLE FREEDOM) kit 1 kit by Does not apply route daily 1 kit 0    Handicap Placard MISC by Does not apply route 1 each 0    blood glucose monitor kit and supplies Test 1 times a day & as needed for symptoms of irregular blood glucose. 1 kit 0    albuterol sulfate HFA (PROAIR HFA) 108 (90 Base) MCG/ACT inhaler Inhale 2 puffs into the lungs every 6 hours as needed for Wheezing 1 Inhaler 11     No current facility-administered medications for this visit. LABS - none ***   There were no vitals taken for this visit.    Wt Readings from Last 3 Encounters:   07/26/22 177 lb (80.3 kg)   07/15/22 177 lb (80.3 kg)   01/14/22 181 lb 9.6 oz (82.4 kg)     Neck Circumference - 16 in; Mallampati Score - 4               Review of Systems  ***    Objective:   Physical Exam    Assessment / Plan:

## 2022-08-03 NOTE — PROGRESS NOTES
Kameron Mcghee is a 63 y.o. female who presents today for:  Chief Complaint   Patient presents with    New Patient     New patient referred by [facility] for COPD . CXR on 5/25/22 and PFT on 6/1/22         HPI:   Kameron Mcghee is 63 y.o. who presents today as a new patient for COPD     Patient has been told she has COPD in the past.  States she has had Anoro and Ventolin inhaler in the past.  States a few months ago she went 10 days where she could not catch her breath after mowing her lawn. After that she was started on Breztri by her primary care's office and she states that helped. However patient states that she has stopped using the Breztri inhaler consistently because it gives her a feeling that her \"lungs are filling up\". She now uses Ventolin and Breztri as needed, using Ventolin more frequently. She states she does not use the Ventolin that much during the week. Currently smokes 18 to 20 cigarettes/day. Has been smoking since she was 27years old. Father has history of COPD. She finds that she is short of breath with exertion. She states she cannot go across her long without feeling short of breath. Denies current shortness of breath at rest.  Denies cough.       Objective:     Vitals:    08/03/22 1344   BP: 120/76   Site: Right Upper Arm   Position: Sitting   Cuff Size: Medium Adult   Pulse: 67   Temp: 98 °F (36.7 °C)   TempSrc: Skin   SpO2: 96%   Weight: 176 lb (79.8 kg)   Height: 5' 2.5\" (1.588 m)       Wt Readings from Last 3 Encounters:   08/03/22 176 lb (79.8 kg)   07/26/22 177 lb (80.3 kg)   07/15/22 177 lb (80.3 kg)       BP Readings from Last 3 Encounters:   08/03/22 120/76   07/26/22 118/68   07/15/22 115/76       Lab Results   Component Value Date    WBC 10.2 07/23/2022    WBC 0-5 07/23/2022    HGB 15.8 (H) 07/23/2022    HCT 48.2 (H) 07/23/2022    MCV 88.4 07/23/2022     07/23/2022     Lab Results   Component Value Date     07/23/2022    K 4.3 07/23/2022     07/23/2022    CO2 29 07/23/2022    BUN 8 07/23/2022    CREATININE 0.72 07/23/2022    GLUCOSE 85 07/23/2022    GLUCOSE NEGATIVE 07/23/2022    CALCIUM 9.7 07/23/2022    PROT 7.2 07/23/2022    LABALBU 4.9 07/23/2022    BILITOT 0.3 07/23/2022    BILITOT NEGATIVE 07/23/2022    ALKPHOS 125 07/23/2022    AST 17 07/23/2022    ALT 17 07/23/2022    LABGLOM >90 06/22/2021     Lab Results   Component Value Date    TSH 1.370 07/27/2022    FT3 3.3 02/21/2018    T4FREE 1.14 07/27/2022     Lab Results   Component Value Date    LABA1C 6.1 07/15/2022     No results found for: EAG  Lab Results   Component Value Date    CHOL 212 (H) 07/23/2022    CHOL 209 (H) 06/22/2021    CHOL 199 11/10/2020     Lab Results   Component Value Date    TRIG 106 07/23/2022    TRIG 125 06/22/2021    TRIG 156 (H) 11/10/2020     Lab Results   Component Value Date    HDL 44 07/23/2022    HDL 45 06/22/2021    HDL 42 11/10/2020     Lab Results   Component Value Date    LDLCALC 147 (H) 07/23/2022    LDLCALC 139 06/22/2021    LDLCALC 126 11/10/2020       Lab Results   Component Value Date    LABMICR < 1.20 06/22/2021       Review of Systems   Constitutional:  Negative for chills, fatigue and fever. Respiratory:  Positive for shortness of breath and wheezing. Negative for choking and chest tightness. Cardiovascular:  Negative for chest pain. Gastrointestinal:  Negative for abdominal distention. Musculoskeletal: Negative. Neurological: Negative. Hematological: Negative. Psychiatric/Behavioral: Negative. Physical Exam  Constitutional:       General: She is not in acute distress. Appearance: She is not ill-appearing. Eyes:      General: No scleral icterus. Right eye: No discharge. Left eye: No discharge. Cardiovascular:      Rate and Rhythm: Normal rate and regular rhythm. Pulmonary:      Effort: Pulmonary effort is normal. No respiratory distress. Breath sounds: No stridor. No wheezing, rhonchi or rales. Abdominal:      General: Abdomen is flat. Palpations: Abdomen is soft. Tenderness: There is no abdominal tenderness. Musculoskeletal:      Right lower leg: No edema. Left lower leg: No edema. Neurological:      General: No focal deficit present. Mental Status: She is alert and oriented to person, place, and time. Psychiatric:         Mood and Affect: Mood normal.         Behavior: Behavior normal.       Six Minute Walk Test  Dorene Sprague 1960    Six minute walk test done in my office today by my medical assistant. Brys oxygen saturation at rest on room air was 91%. Her oxygen saturation dropped to 88% on room air with exertion after walking 216 feet and within 1 minute. The six minute walk test was repeated with oxygen supplementation. Oxygen supplementation was started with 1 LPM via nasal cannula and titrated to 2 LPM via nasal cannula. At the end of the test Emmy Walterss oxygen saturation remained at 91% on 2 LPM with exertion. She is mobile in the home and requires oxygen as outlined above. Patient ambulated a total of 972 feet with oxygen. Resting Dyspnea/Huber score was 6 / 0  and 3 / 2  upon completion of the walk. Resting heart rate was  68 bpm and 84 bpm upon completing the walk. Nasal Oxygen order:  2 lpm to be used with:  Rest: No.  Walking: Yes. Sleep: No.   POC flow: No.  Continuous flow: No.    DME Medical Necessity Documentation    Jostin Wallace was seen in the office on 8/3/2022 for the diagnosis COPD. I am prescribing oxygen because the diagnosis and testing requires the patient to have oxygen in the home. her condition will improve or be benefited by oxygen use. The patient is able to perform good mobility in a home setting and therefore does require the use of a portable oxygen system.      Immunization History   Administered Date(s) Administered    COVID-19, PFIZER PURPLE top, DILUTE for use, (age 15 y+), 30mcg/0.3mL 01/08/2022, 01/29/2022    Hepatitis B (Recombivax HB) 10/08/2015, 12/21/2015, 04/25/2016    Influenza Virus Vaccine 10/08/2015    Influenza, MDCK Quadv, IM, PF (Flucelvax 2 yrs and older) 01/14/2022    Influenza, Quadv, IM, (6 mo and older Fluzone, Flulaval, Fluarix and 3 yrs and older Afluria) 09/21/2016, 01/14/2019, 10/30/2019    Influenza, Quadv, IM, PF (6 mo and older Fluzone, Flulaval, Fluarix, and 3 yrs and older Afluria) 11/09/2020    Pneumococcal Conjugate 13-valent ([id number]) 08/13/2015    Pneumococcal Polysaccharide ([id number]) 11/14/2016, 01/14/2022    Tdap (Boostrix, Adacel) 08/13/2015    Zoster Recombinant (Shingrix) 12/09/2020       Health Maintenance Due   Topic Date Due    Diabetic retinal exam  10/08/2016    Shingles vaccine (2 of 2) 02/03/2021    Annual Wellness Visit (AWV)  12/10/2021    COVID-19 Vaccine (3 - Booster for Pfizer series) 06/29/2022    Low dose CT lung screening  07/02/2022       Food Insecurity: No Food Insecurity    Worried About Running Out of Food in the Last Year: Never true    Ran Out of Food in the Last Year: Never true       Assessment / Plan:       Diagnosis Orders   1. Stage 3 severe COPD by GOLD classification ([address]  Alpha-1-Antitrypsin    6 Minute Walk Test    fluticasone-umeclidin-vilant (Debi Hernandez) 100[facility]62.5-25 [facility]    DME Order for Home Oxygen as OP      2. Cigarette smoker motivated to quit  DME Order for Home Oxygen as OP      3. Chronic respiratory failure with hypoxia [facility])  DME Order for Home Oxygen as OP             Return in about 3 months (around 11/3/2022). Order alpha 1 antitrypsin. DME order placed for home oxygen with ambulation based on 6-minute walk test today. Refer to [facility] pulmonary rehab for states stage III COPD by Gold classification  Patient will attempt to quit smoking with acupuncture. Benefits of cessation discussed at length with patient. Order trelegy. Samples given in office today.           Medications

## 2022-08-09 ENCOUNTER — FOLLOWUP TELEPHONE ENCOUNTER (OUTPATIENT)
Dept: CARDIAC REHAB | Age: 62
End: 2022-08-09

## 2022-08-09 NOTE — TELEPHONE ENCOUNTER
PULMONARY REHABILITATION REFERRAL  COPD Exacerbation    Pulmonary Rehab Evaluation order received. Spoke with patient about the benefits of supervised comfortable, progressive exercise with oxygen saturation monitoring and pulmonary education. Pt states she may be interested but has a lot of things going on right now. Pt asked if we could call back in end of September because by then she will be done with all her appts. I told pt we would call back in end of September like she requests.

## 2022-08-11 ENCOUNTER — HOSPITAL ENCOUNTER (OUTPATIENT)
Dept: ULTRASOUND IMAGING | Age: 62
Discharge: HOME | End: 2022-08-11
Payer: MEDICARE

## 2022-08-11 DIAGNOSIS — R07.0 THROAT DISCOMFORT: ICD-10-CM

## 2022-08-11 PROCEDURE — 76536 US EXAM OF HEAD AND NECK: CPT

## 2022-09-20 NOTE — TELEPHONE ENCOUNTER
Tried to call pt back at this time like she requested for pulmonary rehab. There was no answer and unable to leave a message.

## 2022-10-04 NOTE — TELEPHONE ENCOUNTER
Pt called right back after I called her and when I answered phone stating pulmonary rehab at [address] [facility] the pt hung up.

## 2022-10-04 NOTE — TELEPHONE ENCOUNTER
PULMONARY REHABILITATION       PULMONARY REHAB: PATIENT NOT ENROLLED       Patient Name: Savita Grimm   Patient YOB: 1960      Referring Provider: Jewell Holder DO  Diagnosis: COPD  Date Patient Referred: 8/3/2022      Thank you for referring your patient to our Pulmonary Rehab program. At this time, the referred patient is currently not enrolled in our program for the following reason(s):    [x]  Patient is not interested    []  PFT does not qualify patient for Pulmonary Rehab/ RTR     []  Attempted to call patient, call has not been returned    []  Insurance Issue(s) (i.e. lack of coverage, copay, etc)    []  Other:       Note:  Spoke with on 8/9 and pt asked us to call back at end of September. Attempted to call pt on 9/20, 9/27 and 10/4 and no answer and unable to leave a message. Today, on 10/4, the pt called back after I called her and when I answered stating :hello, Pulmonary rehab at [address] [place] [address]. [facility]\" the pt hung up. Referral closed at this time. Thank you for your continued support of our Pulmonary Rehab program. If the patient would like to attend rehab in the future, please send a new referral. Please call us at [phone] if you have any questions or concerns.       Len Curran, RRT, CPFT, CTTS 10/4/2022  Pulmonary Rehab Staff Signature

## 2022-11-03 ENCOUNTER — OFFICE VISIT (OUTPATIENT)
Dept: PULMONOLOGY | Age: 62
End: 2022-11-03
Payer: MEDICARE

## 2022-11-03 VITALS
TEMPERATURE: 97.9 F | HEIGHT: 64 IN | OXYGEN SATURATION: 94 % | BODY MASS INDEX: 30.05 KG/M2 | SYSTOLIC BLOOD PRESSURE: 130 MMHG | WEIGHT: 176 LBS | DIASTOLIC BLOOD PRESSURE: 84 MMHG

## 2022-11-03 DIAGNOSIS — Z87.891 PERSONAL HISTORY OF TOBACCO USE: Primary | ICD-10-CM

## 2022-11-03 DIAGNOSIS — J44.9 CHRONIC OBSTRUCTIVE PULMONARY DISEASE, UNSPECIFIED COPD TYPE (HCC): ICD-10-CM

## 2022-11-03 PROCEDURE — G8484 FLU IMMUNIZE NO ADMIN: HCPCS | Performed by: INTERNAL MEDICINE

## 2022-11-03 PROCEDURE — 3078F DIAST BP <80 MM HG: CPT | Performed by: INTERNAL MEDICINE

## 2022-11-03 PROCEDURE — 99213 OFFICE O/P EST LOW 20 MIN: CPT | Performed by: INTERNAL MEDICINE

## 2022-11-03 PROCEDURE — 3074F SYST BP LT 130 MM HG: CPT | Performed by: INTERNAL MEDICINE

## 2022-11-03 PROCEDURE — 3023F SPIROM DOC REV: CPT | Performed by: INTERNAL MEDICINE

## 2022-11-03 PROCEDURE — G8427 DOCREV CUR MEDS BY ELIG CLIN: HCPCS | Performed by: INTERNAL MEDICINE

## 2022-11-03 PROCEDURE — G0296 VISIT TO DETERM LDCT ELIG: HCPCS | Performed by: INTERNAL MEDICINE

## 2022-11-03 PROCEDURE — G8417 CALC BMI ABV UP PARAM F/U: HCPCS | Performed by: INTERNAL MEDICINE

## 2022-11-03 PROCEDURE — 4004F PT TOBACCO SCREEN RCVD TLK: CPT | Performed by: INTERNAL MEDICINE

## 2022-11-03 PROCEDURE — 3017F COLORECTAL CA SCREEN DOC REV: CPT | Performed by: INTERNAL MEDICINE

## 2022-11-03 RX ORDER — LACTOBACIL 2/BIFIDO 1/S.THERMO 450B CELL
2 PACKET (EA) ORAL DAILY
COMMUNITY

## 2022-11-03 RX ORDER — ALBUTEROL SULFATE 90 UG/1
2 AEROSOL, METERED RESPIRATORY (INHALATION) EVERY 6 HOURS PRN
Qty: 1 EACH | Refills: 11 | Status: SHIPPED | OUTPATIENT
Start: 2022-11-03

## 2022-11-03 ASSESSMENT — ENCOUNTER SYMPTOMS
SHORTNESS OF BREATH: 1
CHEST TIGHTNESS: 0
ABDOMINAL DISTENTION: 0
WHEEZING: 1
CHOKING: 0

## 2022-11-03 NOTE — PROGRESS NOTES
Randee Zuluaga is a 63 y.o. female who presents today for:  Chief Complaint   Patient presents with    Follow-up     3 month f/u for COPD. AIA was ordered at last ov. HPI:   Randee Zuluaga is 63 y.o. who presents today as a f/u or COPD     Did not do acupuncture but cut down to 11 cig a day  Uses supplemental oxygen with activities, did not bring to office today  Did not go to pulm rehab  No flu shot yet, will get it Saturday at [employer] along with covid booster  Better with Trelegy feels an improvement over Anore, less congestion and mucus  Overdue for screening CT      Previous notes  Patient has been told she has COPD in the past.  States she has had Anoro and Ventolin inhaler in the past.  States a few months ago she went 10 days where she could not catch her breath after mowing her lawn. After that she was started on Breztri by her primary care's office and she states that helped. However patient states that she has stopped using the Breztri inhaler consistently because it gives her a feeling that her \"lungs are filling up\". She now uses Ventolin and Breztri as needed, using Ventolin more frequently. She states she does not use the Ventolin that much during the week. Currently smokes 18 to 20 cigarettes/day. Has been smoking since she was 27years old. Father has history of COPD. She finds that she is short of breath with exertion. She states she cannot go across her long without feeling short of breath. Denies current shortness of breath at rest.  Denies cough.       Objective:     Vitals:    11/03/22 1008   BP: 130/84   Site: Right Upper Arm   Position: Sitting   Cuff Size: Medium Adult   Temp: 97.9 °F (36.6 °C)   SpO2: 94%   Weight: 176 lb (79.8 kg)   Height: 5' 4\" (1.626 m)       Wt Readings from Last 3 Encounters:   11/03/22 176 lb (79.8 kg)   08/30/22 175 lb 6.4 oz (79.6 kg)   08/03/22 176 lb (79.8 kg)       BP Readings from Last 3 Encounters:   11/03/22 130/84   08/30/22 118/62 08/03/22 120/76       Lab Results   Component Value Date    WBC 10.2 07/23/2022    WBC 0-5 07/23/2022    HGB 15.8 (H) 07/23/2022    HCT 48.2 (H) 07/23/2022    MCV 88.4 07/23/2022     07/23/2022     Lab Results   Component Value Date     07/23/2022    K 4.3 07/23/2022     07/23/2022    CO2 29 07/23/2022    BUN 8 07/23/2022    CREATININE 0.72 07/23/2022    GLUCOSE 85 07/23/2022    GLUCOSE NEGATIVE 07/23/2022    CALCIUM 9.7 07/23/2022    PROT 7.2 07/23/2022    LABALBU 4.9 07/23/2022    BILITOT 0.3 07/23/2022    BILITOT NEGATIVE 07/23/2022    ALKPHOS 125 07/23/2022    AST 17 07/23/2022    ALT 17 07/23/2022    LABGLOM >90 06/22/2021     Lab Results   Component Value Date    TSH 1.370 07/27/2022    FT3 3.3 02/21/2018    T4FREE 1.14 07/27/2022     Lab Results   Component Value Date    LABA1C 6.1 07/15/2022     No results found for: EAG  Lab Results   Component Value Date    CHOL 212 (H) 07/23/2022    CHOL 209 (H) 06/22/2021    CHOL 199 11/10/2020     Lab Results   Component Value Date    TRIG 106 07/23/2022    TRIG 125 06/22/2021    TRIG 156 (H) 11/10/2020     Lab Results   Component Value Date    HDL 44 07/23/2022    HDL 45 06/22/2021    HDL 42 11/10/2020     Lab Results   Component Value Date    LDLCALC 147 (H) 07/23/2022    LDLCALC 139 06/22/2021    LDLCALC 126 11/10/2020       Lab Results   Component Value Date    LABMICR < 1.20 06/22/2021       Review of Systems   Constitutional:  Negative for chills, fatigue and fever. Respiratory:  Positive for shortness of breath and wheezing. Negative for choking and chest tightness. Cardiovascular:  Negative for chest pain. Gastrointestinal:  Negative for abdominal distention. Musculoskeletal: Negative. Neurological: Negative. Hematological: Negative. Psychiatric/Behavioral: Negative. Physical Exam  Constitutional:       General: She is not in acute distress. Appearance: She is not ill-appearing.    Eyes:      General: No scleral icterus. Right eye: No discharge. Left eye: No discharge. Cardiovascular:      Rate and Rhythm: Normal rate and regular rhythm. Pulmonary:      Effort: Pulmonary effort is normal. No respiratory distress. Breath sounds: No stridor. No wheezing, rhonchi or rales. Abdominal:      General: Abdomen is flat. Palpations: Abdomen is soft. Tenderness: There is no abdominal tenderness. Musculoskeletal:      Right lower leg: No edema. Left lower leg: No edema. Neurological:      General: No focal deficit present. Mental Status: She is alert and oriented to person, place, and time.    Psychiatric:         Mood and Affect: Mood normal.         Behavior: Behavior normal.          Immunization History   Administered Date(s) Administered    COVID-19, PFIZER GRAY top, DO NOT Dilute, (age 15 y+), IM, 30 mcg/0.3 mL 07/30/2022    COVID-19, PFIZER PURPLE top, DILUTE for use, (age 15 y+), 30mcg/0.3mL 01/08/2022, 01/29/2022    Hepatitis B (Recombivax HB) 10/08/2015, 12/21/2015, 04/25/2016    Influenza Virus Vaccine 10/08/2015    Influenza, AFLURIA (age 1 yrs+), FLUZONE, (age 10 mo+), MDV, 0.5mL 09/21/2016, 01/14/2019, 10/30/2019    Influenza, FLUARIX, FLULAVAL, FLUZONE (age 10 mo+) AND AFLURIA, (age 1 y+), PF, 0.5mL 11/09/2020    Influenza, FLUCELVAX, (age 10 mo+), MDCK, PF, 0.5mL 01/14/2022    Pneumococcal Conjugate 13-valent ([id number]) 08/13/2015    Pneumococcal Polysaccharide ([id number]) 11/14/2016, 01/14/2022    Tdap (Boostrix, Adacel) 08/13/2015    Zoster Recombinant (Shingrix) 12/09/2020       Health Maintenance Due   Topic Date Due    Diabetic retinal exam  10/08/2016    Shingles vaccine (2 of 2) 02/03/2021    Low dose CT lung screening  07/02/2022    Annual Wellness Visit (AWV)  07/15/2022    Flu vaccine (1) 08/01/2022    COVID-19 Vaccine (4 - Booster for Pfizer series) 09/24/2022       Food Insecurity: No Food Insecurity    Worried About Running Out of Food in the Last Year: Never true    Ran Out of Food in the Last Year: Never true       Assessment / Plan:         Diagnosis Orders   1. Personal history of tobacco use  IN VISIT TO DISCUSS LUNG CA SCREEN W LDCT    CT Lung Screen (Annual)      2. Chronic obstructive pulmonary disease, unspecified COPD type ([facility] [address]                Medications Prescribed:  Orders Placed This Encounter   Medications    albuterol sulfate HFA (PROAIR HFA) 108 (90 Base) MCG/ACT inhaler     Sig: Inhale 2 puffs into the lungs every 6 hours as needed for Wheezing     Dispense:  1 each     Refill:  11   Continue Trelegy/Albuterol      Future Appointments   Date Time Provider Cat Town[place]   11/30/2022  3:30 PM Bong Correa MD [facility] END   1/20/2023  8:00 AM FRANCIS[facility]otomayor [facility] [place] [address]       Patient given educational materials - see patient instructions. Discussed use, benefit, and sideeffects of prescribed medications. All patient questions answered. Pt voiced understanding. Reviewed health maintenance. Instructed to continue current medications, diet and exercise. Patient agreed with treatment plan. Follow up as directed. Patient was counseled on tobacco cessation. Based upon patient's motivation to change her behavior, the following plan was agreed upon: patient will try the following tobacco cessation strategies:  willpower. She was provided with a list of local tobacco cessation resources. Provider spent 5 minutes counseling patient. Electronically signed by Aydee Rosario MD on 11/3/2022 at 10:47 AMDiscussed with the patient the current USPSTF guidelines released March 9, 2021 for screening for lung cancer. For adults aged 48 to [de-identified] years who have a 20 pack-year smoking history and currently smoke or have quit within the past 15 years the grade B recommendation is to:  Screen for lung cancer with low-dose computed tomography (LDCT) every year.   Stop screening once a person has not smoked for 15 years or has a health problem that limits life expectancy or the ability to have lung surgery. The patient  reports that she has been smoking cigarettes. She started smoking about 43 years ago. She has a 21.00 pack-year smoking history. She has never used smokeless tobacco.. Discussed with patient the risks and benefits of screening, including over-diagnosis, false positive rate, and total radiation exposure. The patient currently exhibits no signs or symptoms suggestive of lung cancer. Discussed with patient the importance of compliance with yearly annual lung cancer screenings and willingness to undergo diagnosis and treatment if screening scan is positive. In addition, the patient was counseled regarding the importance of remaining smoke free and/or total smoking cessation.     Also reviewed the following if the patient has Medicare that as of February 10, 2022, Medicare only covers LDCT screening in patients aged 51-72 with at least a 20 pack-year smoking history who currently smoke or have quit in the last 15 years

## 2022-11-03 NOTE — PATIENT INSTRUCTIONS

## 2023-01-20 ENCOUNTER — OFFICE VISIT (OUTPATIENT)
Dept: FAMILY MEDICINE CLINIC | Age: 63
End: 2023-01-20

## 2023-01-20 VITALS
BODY MASS INDEX: 30.15 KG/M2 | TEMPERATURE: 97.3 F | OXYGEN SATURATION: 94 % | DIASTOLIC BLOOD PRESSURE: 76 MMHG | RESPIRATION RATE: 16 BRPM | HEIGHT: 64 IN | HEART RATE: 73 BPM | SYSTOLIC BLOOD PRESSURE: 124 MMHG | WEIGHT: 176.6 LBS

## 2023-01-20 DIAGNOSIS — F32.5 MAJOR DEPRESSIVE DISORDER WITH SINGLE EPISODE, IN FULL REMISSION (HCC): ICD-10-CM

## 2023-01-20 DIAGNOSIS — J44.9 CHRONIC OBSTRUCTIVE PULMONARY DISEASE, UNSPECIFIED COPD TYPE (HCC): ICD-10-CM

## 2023-01-20 DIAGNOSIS — M25.569 KNEE PAIN, UNSPECIFIED CHRONICITY, UNSPECIFIED LATERALITY: ICD-10-CM

## 2023-01-20 DIAGNOSIS — R25.2 MUSCLE CRAMP, NOCTURNAL: Primary | ICD-10-CM

## 2023-01-20 DIAGNOSIS — G89.29 CHRONIC LOW BACK PAIN, UNSPECIFIED BACK PAIN LATERALITY, UNSPECIFIED WHETHER SCIATICA PRESENT: ICD-10-CM

## 2023-01-20 DIAGNOSIS — Z13.31 DEPRESSION SCREENING: ICD-10-CM

## 2023-01-20 DIAGNOSIS — I10 ESSENTIAL HYPERTENSION: ICD-10-CM

## 2023-01-20 DIAGNOSIS — E05.00 GRAVES DISEASE: ICD-10-CM

## 2023-01-20 DIAGNOSIS — M54.50 CHRONIC LOW BACK PAIN, UNSPECIFIED BACK PAIN LATERALITY, UNSPECIFIED WHETHER SCIATICA PRESENT: ICD-10-CM

## 2023-01-20 DIAGNOSIS — E11.9 TYPE 2 DIABETES MELLITUS WITHOUT COMPLICATION, WITHOUT LONG-TERM CURRENT USE OF INSULIN (HCC): ICD-10-CM

## 2023-01-20 DIAGNOSIS — E78.00 HYPERCHOLESTEROLEMIA: ICD-10-CM

## 2023-01-20 RX ORDER — CARVEDILOL 25 MG/1
25 TABLET ORAL 2 TIMES DAILY WITH MEALS
Qty: 180 TABLET | Refills: 1 | Status: SHIPPED | OUTPATIENT
Start: 2023-01-20

## 2023-01-20 RX ORDER — EZETIMIBE 10 MG/1
10 TABLET ORAL DAILY
Qty: 30 TABLET | Refills: 0 | Status: SHIPPED | OUTPATIENT
Start: 2023-01-20

## 2023-01-20 RX ORDER — TIZANIDINE HYDROCHLORIDE 2 MG/1
2 CAPSULE, GELATIN COATED ORAL NIGHTLY PRN
Qty: 30 CAPSULE | Refills: 1 | Status: SHIPPED | OUTPATIENT
Start: 2023-01-20 | End: 2023-01-20

## 2023-01-20 RX ORDER — AMLODIPINE BESYLATE 10 MG/1
10 TABLET ORAL DAILY
Qty: 90 TABLET | Refills: 1 | Status: SHIPPED | OUTPATIENT
Start: 2023-01-20

## 2023-01-20 RX ORDER — TIZANIDINE HYDROCHLORIDE 2 MG/1
2 CAPSULE, GELATIN COATED ORAL NIGHTLY PRN
Qty: 90 CAPSULE | Refills: 0 | Status: SHIPPED | OUTPATIENT
Start: 2023-01-20

## 2023-01-20 ASSESSMENT — ENCOUNTER SYMPTOMS
PHOTOPHOBIA: 0
CONSTIPATION: 0
SINUS PRESSURE: 0
WHEEZING: 0
RHINORRHEA: 0
BLOOD IN STOOL: 0
NAUSEA: 0
SINUS PAIN: 0
VOICE CHANGE: 0
SHORTNESS OF BREATH: 0
DIARRHEA: 0
TROUBLE SWALLOWING: 0
COUGH: 0
VOMITING: 0
SORE THROAT: 0
CHEST TIGHTNESS: 0
BACK PAIN: 1
ABDOMINAL PAIN: 0

## 2023-01-20 ASSESSMENT — PATIENT HEALTH QUESTIONNAIRE - PHQ9
1. LITTLE INTEREST OR PLEASURE IN DOING THINGS: 0
4. FEELING TIRED OR HAVING LITTLE ENERGY: 3
7. TROUBLE CONCENTRATING ON THINGS, SUCH AS READING THE NEWSPAPER OR WATCHING TELEVISION: 0
SUM OF ALL RESPONSES TO PHQ9 QUESTIONS 1 & 2: 0
2. FEELING DOWN, DEPRESSED OR HOPELESS: 0
8. MOVING OR SPEAKING SO SLOWLY THAT OTHER PEOPLE COULD HAVE NOTICED. OR THE OPPOSITE, BEING SO FIGETY OR RESTLESS THAT YOU HAVE BEEN MOVING AROUND A LOT MORE THAN USUAL: 0
3. TROUBLE FALLING OR STAYING ASLEEP: 0
SUM OF ALL RESPONSES TO PHQ QUESTIONS 1-9: 3
SUM OF ALL RESPONSES TO PHQ QUESTIONS 1-9: 3
9. THOUGHTS THAT YOU WOULD BE BETTER OFF DEAD, OR OF HURTING YOURSELF: 0
5. POOR APPETITE OR OVEREATING: 0
SUM OF ALL RESPONSES TO PHQ QUESTIONS 1-9: 3
6. FEELING BAD ABOUT YOURSELF - OR THAT YOU ARE A FAILURE OR HAVE LET YOURSELF OR YOUR FAMILY DOWN: 0
SUM OF ALL RESPONSES TO PHQ QUESTIONS 1-9: 3
10. IF YOU CHECKED OFF ANY PROBLEMS, HOW DIFFICULT HAVE THESE PROBLEMS MADE IT FOR YOU TO DO YOUR WORK, TAKE CARE OF THINGS AT HOME, OR GET ALONG WITH OTHER PEOPLE: 0

## 2023-01-20 NOTE — PATIENT INSTRUCTIONS
Thank you   Thank you for trusting us with your healthcare needs. You may receive a survey regarding today's visit. It would help us out if you would take a few moments to provide your feedback. We value your input. Please bring in ALL medications BOTTLES, including inhalers, herbal supplements, over the counter, prescribed & non-prescribed medicine. The office would like actual medication bottles and a list.   Please note our OFFICE POLICIES:   Prior to getting your labs drawn, please check with your insurance company for benefits and eligibility of lab services. Often, insurance companies cover certain tests for preventative visits only. It is patient's responsibility to see what is covered. We are unable to change a diagnosis after the test has been performed. Lab orders will not be re-printed. Please hold onto your original lab orders and take them to your lab to be completed. If you no show your scheduled appointment three times, you will be dismissed from this practice. Reschedules must be completed 24 hours prior to your schedule appointment. If the list below has been completed, PLEASE FAX RECORDS TO OUR OFFICE @ [phone].  Once the records have been received we will update your records at our office:  Health Maintenance Due   Topic Date Due    Diabetic retinal exam  10/08/2016    Shingles vaccine (2 of 2) 02/03/2021    Diabetic Alb to Cr ratio (uACR) test  06/22/2022    Low dose CT lung screening  07/02/2022    Annual Wellness Visit (AWV)  07/15/2022    Depression Monitoring  01/14/2023

## 2023-01-20 NOTE — PROGRESS NOTES
Health Maintenance Due   Topic Date Due    Diabetic retinal exam  10/08/2016    Shingles vaccine (2 of 2) 02/03/2021    Diabetic Alb to Cr ratio (uACR) test  06/22/2022    Low dose CT lung screening  07/02/2022    Annual Wellness Visit (AWV)  07/15/2022    Depression Monitoring  01/14/2023

## 2023-01-20 NOTE — PROGRESS NOTES
Emmy Badillo (:  1960) is a 62 y.o. female,Established patient, here for evaluation of the following chief complaint(s):  6 Month Follow-Up (Medication refills)         ASSESSMENT/PLAN:  1. Depression screening  -     TN DEPRESSION SCREEN ANNUAL  2. Essential hypertension  -     amLODIPine (NORVASC) 10 MG tablet; Take 1 tablet by mouth daily, Disp-90 tablet, R-1Normal  -     carvedilol (COREG) 25 MG tablet; Take 1 tablet by mouth 2 times daily (with meals), Disp-180 tablet, R-1Normal  -     Microalbumin / Creatinine Urine Ratio; Future  3. Type 2 diabetes mellitus without complication, without long-term current use of insulin (HCC)  -     SITagliptin (JANUVIA) 100 MG tablet; Take 1 tablet by mouth daily, Disp-90 tablet, R-1Normal  -     Hemoglobin A1C; Future  -     CBC with Auto Differential; Future  -     Microalbumin / Creatinine Urine Ratio; Future  4. Hypercholesterolemia  -     SITagliptin (JANUVIA) 100 MG tablet; Take 1 tablet by mouth daily, Disp-90 tablet, R-1Normal  -     ezetimibe (ZETIA) 10 MG tablet; Take 1 tablet by mouth daily, Disp-30 tablet, R-0Normal  5. Chronic obstructive pulmonary disease, unspecified COPD type (HCC)  6. Graves disease  -     TN DEPRESSION SCREEN ANNUAL  7. Muscle cramp, nocturnal  -     Ferritin; Future  -     tiZANidine (ZANAFLEX) 2 MG capsule; Take 1 capsule by mouth nightly as needed for Muscle spasms, Disp-90 capsule, R-0Normal  8. Chronic low back pain, unspecified back pain laterality, unspecified whether sciatica present  -     tiZANidine (ZANAFLEX) 2 MG capsule; Take 1 capsule by mouth nightly as needed for Muscle spasms, Disp-90 capsule, R-0Normal  9. Major depressive disorder with single episode, in full remission (HCC)  -     TN DEPRESSION SCREEN ANNUAL  10. Knee pain, unspecified chronicity, unspecified laterality   -     Ferritin; Future    Start Zetia, recheck lipids per previosly ordered labs.  If not improved greatly, can restart Statin w/ Co  Q.  Start Zanaflex 2 QHS PRN for muscle pain. Follow up labs form Dr. Judi Patterson A1c, ferritin, cbc microalb. Follow up With specialists as scheduled. Encourage daily use of trelegy and smoking cessation. Tentatively see back in 2 months for leg cramps. Declines Mammograms. Return in about 2 months (around 3/20/2023) for f/u chronic conditions, Lipids. .         Subjective   SUBJECTIVE/OBJECTIVE:  HPI    Pt is a 64 y/o female w/ PM DM2, HTN, HLD, Graves, CBP, COPD, nocturnal muscle  cramps. Since last seen Pt has seen endocrine and was switched from crestor to lipitor 2/2 hip/knee pain however pt has stopped lipotir at this time 2/2 recurrent hip/knee pain, has been off for 4 days. Otherwise doing well, not check blood glucose at home last a1c 6.1 in 5 [facility], will recheck via bloodwork, endo also seeing for DM2 and grave's. Pt notes muscle cramps qhs  previously on muscle relaxant which helped. Review of Systems   Constitutional:  Negative for chills, diaphoresis, fatigue and fever. HENT:  Negative for congestion, postnasal drip, rhinorrhea, sinus pressure, sinus pain, sore throat, trouble swallowing and voice change. Eyes:  Negative for photophobia and visual disturbance. Respiratory:  Negative for cough, chest tightness, shortness of breath and wheezing. Cardiovascular:  Negative for chest pain, palpitations and leg swelling. Gastrointestinal:  Negative for abdominal pain, blood in stool, constipation, diarrhea, nausea and vomiting. Genitourinary:  Negative for decreased urine volume, difficulty urinating, dysuria, frequency, hematuria, menstrual problem, pelvic pain and urgency. Musculoskeletal:  Positive for arthralgias, back pain (chronic), myalgias and neck stiffness. Negative for gait problem, joint swelling and neck pain. Skin:  Negative for rash and wound. Neurological:  Negative for dizziness, seizures, syncope, weakness, light-headedness, numbness and headaches. Hematological:  Negative for adenopathy. Psychiatric/Behavioral:  Negative for behavioral problems, decreased concentration, dysphoric mood, self-injury, sleep disturbance and suicidal ideas. The patient is not nervous/anxious. Objective     Vitals:    01/20/23 0921   BP: 124/76   Site: Left Upper Arm   Position: Sitting   Cuff Size: Medium Adult   Pulse: 73   Resp: 16   Temp: 97.3 °F (36.3 °C)   TempSrc: Temporal   SpO2: 94%   Weight: 176 lb 9.6 oz (80.1 kg)   Height: 5' 4\" (1.626 m)       Physical Exam  Constitutional:       General: She is not in acute distress. Appearance: Normal appearance. She is not ill-appearing or diaphoretic. HENT:      Head: Normocephalic and atraumatic. Nose: Nose normal. No congestion or rhinorrhea. Mouth/Throat:      Mouth: Mucous membranes are moist.      Pharynx: Oropharynx is clear. No oropharyngeal exudate or posterior oropharyngeal erythema. Eyes:      General: No scleral icterus. Right eye: No discharge. Left eye: No discharge. Extraocular Movements: Extraocular movements intact. Conjunctiva/sclera: Conjunctivae normal.      Pupils: Pupils are equal, round, and reactive to light. Cardiovascular:      Rate and Rhythm: Normal rate. Pulses: Normal pulses. Heart sounds: Normal heart sounds. No murmur heard. No friction rub. No gallop. Pulmonary:      Effort: Pulmonary effort is normal. No respiratory distress. Breath sounds: Normal breath sounds. No wheezing, rhonchi or rales. Abdominal:      General: Abdomen is flat. Bowel sounds are normal. There is no distension. Palpations: Abdomen is soft. Tenderness: There is no abdominal tenderness. There is no guarding. Musculoskeletal:         General: No swelling, tenderness or signs of injury. Cervical back: Normal range of motion and neck supple. Right lower leg: No edema. Left lower leg: No edema.    Skin:     General: Skin is warm and dry. Capillary Refill: Capillary refill takes less than 2 seconds. Coloration: Skin is not jaundiced or pale. Findings: No erythema or rash. Neurological:      General: No focal deficit present. Mental Status: She is alert and oriented to person, place, and time. Cranial Nerves: No cranial nerve deficit. Sensory: No sensory deficit. Motor: No weakness. Gait: Gait normal.   Psychiatric:         Mood and Affect: Mood normal.         Behavior: Behavior normal.         Thought Content: Thought content normal.         Judgment: Judgment normal.          On this date 1/20/2023 I have spent 30 minutes reviewing previous notes, test results and face to face with the patient discussing the diagnosis and importance of compliance with the treatment plan as well as documenting on the day of the visit. An electronic signature was used to authenticate this note.     --Armando Zacarias MD

## 2023-01-28 ENCOUNTER — NURSE ONLY (OUTPATIENT)
Dept: LAB | Age: 63
End: 2023-01-28

## 2023-01-28 DIAGNOSIS — M25.569 KNEE PAIN, UNSPECIFIED CHRONICITY, UNSPECIFIED LATERALITY: ICD-10-CM

## 2023-01-28 DIAGNOSIS — I10 ESSENTIAL HYPERTENSION: ICD-10-CM

## 2023-01-28 DIAGNOSIS — R25.2 MUSCLE CRAMP, NOCTURNAL: ICD-10-CM

## 2023-01-28 DIAGNOSIS — E11.9 TYPE 2 DIABETES MELLITUS WITHOUT COMPLICATION, WITHOUT LONG-TERM CURRENT USE OF INSULIN (HCC): ICD-10-CM

## 2023-01-28 LAB
BASOPHILS ABSOLUTE: 0.1 THOU/MM3 (ref 0–0.1)
BASOPHILS NFR BLD AUTO: 1.1 %
DEPRECATED MEAN GLUCOSE BLD GHB EST-ACNC: 120 MG/DL (ref 70–126)
DEPRECATED RDW RBC AUTO: 40.4 FL (ref 35–45)
EOSINOPHIL NFR BLD AUTO: 2.3 %
EOSINOPHILS ABSOLUTE: 0.3 THOU/MM3 (ref 0–0.4)
ERYTHROCYTE [DISTWIDTH] IN BLOOD BY AUTOMATED COUNT: 12.2 % (ref 11.5–14.5)
FERRITIN SERPL IA-MCNC: 160 NG/ML (ref 10–291)
HBA1C MFR BLD HPLC: 6 % (ref 4.4–6.4)
HCT VFR BLD AUTO: 46.1 % (ref 37–47)
HGB BLD-MCNC: 15.7 GM/DL (ref 12–16)
IMM GRANULOCYTES # BLD AUTO: 0.04 THOU/MM3 (ref 0–0.07)
IMM GRANULOCYTES NFR BLD AUTO: 0.3 %
LYMPHOCYTES ABSOLUTE: 3.6 THOU/MM3 (ref 1–4.8)
LYMPHOCYTES NFR BLD AUTO: 30.2 %
MCH RBC QN AUTO: 30.7 PG (ref 26–33)
MCHC RBC AUTO-ENTMCNC: 34.1 GM/DL (ref 32.2–35.5)
MCV RBC AUTO: 90 FL (ref 81–99)
MONOCYTES ABSOLUTE: 1.3 THOU/MM3 (ref 0.4–1.3)
MONOCYTES NFR BLD AUTO: 11 %
NEUTROPHILS NFR BLD AUTO: 55.1 %
NRBC BLD AUTO-RTO: 0 /100 WBC
PLATELET # BLD AUTO: 295 THOU/MM3 (ref 130–400)
PMV BLD AUTO: 9.6 FL (ref 9.4–12.4)
RBC # BLD AUTO: 5.12 MILL/MM3 (ref 4.2–5.4)
SEGMENTED NEUTROPHILS ABSOLUTE COUNT: 6.6 THOU/MM3 (ref 1.8–7.7)
WBC # BLD AUTO: 12 THOU/MM3 (ref 4.8–10.8)

## 2023-01-30 ENCOUNTER — TELEPHONE (OUTPATIENT)
Dept: FAMILY MEDICINE CLINIC | Age: 63
End: 2023-01-30

## 2023-01-30 NOTE — TELEPHONE ENCOUNTER
----- Message from Francoise Marie MD sent at 1/29/2023  8:44 PM EST -----  Labs grossly,  Mild elevation of White blood Cell count only.   A1c stable at 6.0    Electronically signed by Francoise Marie MD on 1/29/2023 at 8:43 PM

## 2023-01-30 NOTE — RESULT ENCOUNTER NOTE
Labs grossly,  Mild elevation of White blood Cell count only.   A1c stable at 6.0    Electronically signed by Gayle Gary MD on 1/29/2023 at 8:43 PM

## 2023-03-20 ENCOUNTER — OFFICE VISIT (OUTPATIENT)
Dept: FAMILY MEDICINE CLINIC | Age: 63
End: 2023-03-20

## 2023-03-20 ENCOUNTER — NURSE ONLY (OUTPATIENT)
Dept: LAB | Age: 63
End: 2023-03-20

## 2023-03-20 VITALS
HEART RATE: 72 BPM | RESPIRATION RATE: 16 BRPM | BODY MASS INDEX: 29.94 KG/M2 | TEMPERATURE: 97 F | OXYGEN SATURATION: 98 % | HEIGHT: 64 IN | SYSTOLIC BLOOD PRESSURE: 138 MMHG | WEIGHT: 175.4 LBS | DIASTOLIC BLOOD PRESSURE: 92 MMHG

## 2023-03-20 DIAGNOSIS — E11.9 TYPE 2 DIABETES MELLITUS WITHOUT COMPLICATION, WITHOUT LONG-TERM CURRENT USE OF INSULIN (HCC): Primary | ICD-10-CM

## 2023-03-20 DIAGNOSIS — Z86.19: ICD-10-CM

## 2023-03-20 DIAGNOSIS — E11.9 TYPE 2 DIABETES MELLITUS WITHOUT COMPLICATION, WITHOUT LONG-TERM CURRENT USE OF INSULIN (HCC): ICD-10-CM

## 2023-03-20 DIAGNOSIS — I10 ESSENTIAL HYPERTENSION: ICD-10-CM

## 2023-03-20 DIAGNOSIS — E78.00 HYPERCHOLESTEROLEMIA: ICD-10-CM

## 2023-03-20 DIAGNOSIS — A04.72 CLOSTRIDIUM DIFFICILE DIARRHEA: ICD-10-CM

## 2023-03-20 LAB
ALBUMIN SERPL BCG-MCNC: 4.9 G/DL (ref 3.5–5.1)
ALP SERPL-CCNC: 124 U/L (ref 38–126)
ALT SERPL W/O P-5'-P-CCNC: 9 U/L (ref 11–66)
ANION GAP SERPL CALC-SCNC: 10 MEQ/L (ref 8–16)
AST SERPL-CCNC: 12 U/L (ref 5–40)
BILIRUB SERPL-MCNC: 0.5 MG/DL (ref 0.3–1.2)
BUN SERPL-MCNC: 12 MG/DL (ref 7–22)
CALCIUM SERPL-MCNC: 10 MG/DL (ref 8.5–10.5)
CHLORIDE SERPL-SCNC: 99 MEQ/L (ref 98–111)
CHOLEST SERPL-MCNC: 227 MG/DL (ref 100–199)
CO2 SERPL-SCNC: 30 MEQ/L (ref 23–33)
CREAT SERPL-MCNC: 0.7 MG/DL (ref 0.4–1.2)
GFR SERPL CREATININE-BSD FRML MDRD: > 60 ML/MIN/1.73M2
GLUCOSE SERPL-MCNC: 95 MG/DL (ref 70–108)
HDLC SERPL-MCNC: 45 MG/DL
LDLC SERPL CALC-MCNC: 151 MG/DL
POTASSIUM SERPL-SCNC: 4.5 MEQ/L (ref 3.5–5.2)
PROT SERPL-MCNC: 7.6 G/DL (ref 6.1–8)
SODIUM SERPL-SCNC: 139 MEQ/L (ref 135–145)
TRIGL SERPL-MCNC: 154 MG/DL (ref 0–199)

## 2023-03-20 SDOH — ECONOMIC STABILITY: HOUSING INSECURITY
IN THE LAST 12 MONTHS, WAS THERE A TIME WHEN YOU DID NOT HAVE A STEADY PLACE TO SLEEP OR SLEPT IN A SHELTER (INCLUDING NOW)?: NO

## 2023-03-20 SDOH — ECONOMIC STABILITY: FOOD INSECURITY: WITHIN THE PAST 12 MONTHS, THE FOOD YOU BOUGHT JUST DIDN'T LAST AND YOU DIDN'T HAVE MONEY TO GET MORE.: NEVER TRUE

## 2023-03-20 SDOH — ECONOMIC STABILITY: FOOD INSECURITY: WITHIN THE PAST 12 MONTHS, YOU WORRIED THAT YOUR FOOD WOULD RUN OUT BEFORE YOU GOT MONEY TO BUY MORE.: NEVER TRUE

## 2023-03-20 SDOH — ECONOMIC STABILITY: INCOME INSECURITY: HOW HARD IS IT FOR YOU TO PAY FOR THE VERY BASICS LIKE FOOD, HOUSING, MEDICAL CARE, AND HEATING?: NOT HARD AT ALL

## 2023-03-21 ASSESSMENT — ENCOUNTER SYMPTOMS
VOICE CHANGE: 0
VOMITING: 0
NAUSEA: 0
DIARRHEA: 1
TROUBLE SWALLOWING: 0
SINUS PRESSURE: 0
BLOOD IN STOOL: 0
SHORTNESS OF BREATH: 0
CONSTIPATION: 0
SORE THROAT: 0
SINUS PAIN: 0
WHEEZING: 0
RHINORRHEA: 0
COUGH: 0
ABDOMINAL PAIN: 0
PHOTOPHOBIA: 0

## 2023-05-02 NOTE — TELEPHONE ENCOUNTER
Received refill request for Trelegy. Medication was last ordered by Danya Hernandez DO.   Medication was last ordered on 8/3/22 with 1 refills. Patient was last seen in the office 11/3/22. Does patient have a scheduled follow up?: yes - 6/7/23    Medication needs to be sent to [address] [facility]. Thank you, please advise! Patient's Allergies:   Allergies   Allergen Reactions    Codeine      Vomiting

## 2023-05-30 ENCOUNTER — HOSPITAL ENCOUNTER (OUTPATIENT)
Dept: CT IMAGING | Age: 63
Discharge: HOME | End: 2023-05-30
Payer: MEDICARE

## 2023-05-30 DIAGNOSIS — Z87.891 PERSONAL HISTORY OF TOBACCO USE: ICD-10-CM

## 2023-05-30 PROCEDURE — 71271 CT THORAX LUNG CANCER SCR C-: CPT

## 2023-06-07 ENCOUNTER — OFFICE VISIT (OUTPATIENT)
Dept: PULMONOLOGY | Age: 63
End: 2023-06-07

## 2023-06-07 VITALS
OXYGEN SATURATION: 93 % | TEMPERATURE: 98.1 F | HEIGHT: 64 IN | WEIGHT: 173 LBS | SYSTOLIC BLOOD PRESSURE: 120 MMHG | BODY MASS INDEX: 29.53 KG/M2 | DIASTOLIC BLOOD PRESSURE: 72 MMHG | HEART RATE: 70 BPM

## 2023-06-07 DIAGNOSIS — J96.11 CHRONIC RESPIRATORY FAILURE WITH HYPOXIA (HCC): ICD-10-CM

## 2023-06-07 DIAGNOSIS — F17.200 SMOKING ADDICTION: ICD-10-CM

## 2023-06-07 DIAGNOSIS — J44.9 COPD, SEVERE (HCC): Primary | ICD-10-CM

## 2023-06-07 DIAGNOSIS — R93.89 ABNORMAL SCREENING CT OF CHEST: ICD-10-CM

## 2023-06-07 DIAGNOSIS — R91.8 MULTIPLE LUNG NODULES ON CT: ICD-10-CM

## 2023-06-07 ASSESSMENT — ENCOUNTER SYMPTOMS
COUGH: 1
CHEST TIGHTNESS: 0
ABDOMINAL DISTENTION: 0
CHOKING: 0
SHORTNESS OF BREATH: 1

## 2023-06-07 NOTE — PROGRESS NOTES
Date    LABA1C 6.0 01/28/2023     No results found for: EAG  Lab Results   Component Value Date    CHOL 227 (H) 03/20/2023    CHOL 212 (H) 07/23/2022    CHOL 209 (H) 06/22/2021     Lab Results   Component Value Date    TRIG 154 03/20/2023    TRIG 106 07/23/2022    TRIG 125 06/22/2021     Lab Results   Component Value Date    HDL 45 03/20/2023    HDL 44 07/23/2022    HDL 45 06/22/2021     Lab Results   Component Value Date    LDLCALC 151 03/20/2023    LDLCALC 147 (H) 07/23/2022    LDLCALC 139 06/22/2021       Lab Results   Component Value Date    LABMICR < 1.20 06/22/2021       Review of Systems   Constitutional:  Negative for chills, fatigue and fever. Respiratory:  Positive for cough and shortness of breath. Negative for choking and chest tightness. Cardiovascular:  Negative for chest pain. Gastrointestinal:  Negative for abdominal distention. Musculoskeletal: Negative. Neurological: Negative. Hematological: Negative. Psychiatric/Behavioral: Negative. Physical Exam  Constitutional:       General: She is not in acute distress. Appearance: She is not ill-appearing. Eyes:      General: No scleral icterus. Right eye: No discharge. Left eye: No discharge. Cardiovascular:      Rate and Rhythm: Normal rate and regular rhythm. Pulmonary:      Effort: Pulmonary effort is normal. No respiratory distress. Breath sounds: No stridor. No wheezing, rhonchi or rales. Abdominal:      General: Abdomen is flat. Palpations: Abdomen is soft. Tenderness: There is no abdominal tenderness. Musculoskeletal:      Right lower leg: No edema. Left lower leg: No edema. Neurological:      General: No focal deficit present. Mental Status: She is alert and oriented to person, place, and time.    Psychiatric:         Mood and Affect: Mood normal.         Behavior: Behavior normal.          Immunization History   Administered Date(s) Administered    COVID-19, PFIZER

## 2023-06-28 ENCOUNTER — OFFICE VISIT (OUTPATIENT)
Dept: FAMILY MEDICINE CLINIC | Age: 63
End: 2023-06-28

## 2023-06-28 VITALS
SYSTOLIC BLOOD PRESSURE: 126 MMHG | WEIGHT: 172.6 LBS | HEIGHT: 64 IN | BODY MASS INDEX: 29.47 KG/M2 | TEMPERATURE: 97 F | OXYGEN SATURATION: 93 % | RESPIRATION RATE: 16 BRPM | HEART RATE: 83 BPM | DIASTOLIC BLOOD PRESSURE: 70 MMHG

## 2023-06-28 DIAGNOSIS — M54.50 CHRONIC LOW BACK PAIN, UNSPECIFIED BACK PAIN LATERALITY, UNSPECIFIED WHETHER SCIATICA PRESENT: ICD-10-CM

## 2023-06-28 DIAGNOSIS — I10 ESSENTIAL HYPERTENSION: ICD-10-CM

## 2023-06-28 DIAGNOSIS — E11.9 TYPE 2 DIABETES MELLITUS WITHOUT COMPLICATION, WITHOUT LONG-TERM CURRENT USE OF INSULIN (HCC): ICD-10-CM

## 2023-06-28 DIAGNOSIS — E78.00 HYPERCHOLESTEROLEMIA: ICD-10-CM

## 2023-06-28 DIAGNOSIS — G89.29 CHRONIC LOW BACK PAIN, UNSPECIFIED BACK PAIN LATERALITY, UNSPECIFIED WHETHER SCIATICA PRESENT: ICD-10-CM

## 2023-06-28 DIAGNOSIS — E05.00 GRAVES DISEASE: Primary | ICD-10-CM

## 2023-06-28 DIAGNOSIS — R25.2 MUSCLE CRAMP, NOCTURNAL: ICD-10-CM

## 2023-06-28 RX ORDER — FENOFIBRATE 160 MG/1
160 TABLET ORAL DAILY
Qty: 90 TABLET | Refills: 1 | Status: CANCELLED | OUTPATIENT
Start: 2023-06-28

## 2023-06-28 RX ORDER — UREA 10 %
500 LOTION (ML) TOPICAL DAILY
Qty: 30 TABLET | Refills: 0 | Status: SHIPPED | OUTPATIENT
Start: 2023-06-28

## 2023-06-28 RX ORDER — TIZANIDINE HYDROCHLORIDE 2 MG/1
2 CAPSULE, GELATIN COATED ORAL NIGHTLY PRN
Qty: 90 CAPSULE | Refills: 0 | Status: SHIPPED | OUTPATIENT
Start: 2023-06-28

## 2023-06-28 RX ORDER — EZETIMIBE 10 MG/1
10 TABLET ORAL DAILY
Qty: 90 TABLET | Refills: 1 | Status: SHIPPED | OUTPATIENT
Start: 2023-06-28

## 2023-06-28 RX ORDER — AMLODIPINE BESYLATE 10 MG/1
10 TABLET ORAL DAILY
Qty: 90 TABLET | Refills: 1 | Status: SHIPPED | OUTPATIENT
Start: 2023-06-28

## 2023-06-28 RX ORDER — CARVEDILOL 25 MG/1
25 TABLET ORAL 2 TIMES DAILY WITH MEALS
Qty: 180 TABLET | Refills: 1 | Status: SHIPPED | OUTPATIENT
Start: 2023-06-28

## 2023-06-28 ASSESSMENT — ENCOUNTER SYMPTOMS
VOMITING: 0
SORE THROAT: 0
WHEEZING: 0
RECTAL PAIN: 0
ANAL BLEEDING: 0
PHOTOPHOBIA: 0
CONSTIPATION: 1
BLOOD IN STOOL: 0
NAUSEA: 0
COUGH: 0
SINUS PAIN: 0
SHORTNESS OF BREATH: 0
SINUS PRESSURE: 0
ABDOMINAL PAIN: 0
DIARRHEA: 1
TROUBLE SWALLOWING: 0
RHINORRHEA: 0
VOICE CHANGE: 0

## 2023-09-09 ENCOUNTER — NURSE ONLY (OUTPATIENT)
Dept: LAB | Age: 63
End: 2023-09-09

## 2023-09-09 DIAGNOSIS — E78.00 HYPERCHOLESTEROLEMIA: ICD-10-CM

## 2023-09-09 DIAGNOSIS — R25.2 MUSCLE CRAMP, NOCTURNAL: ICD-10-CM

## 2023-09-09 DIAGNOSIS — E11.9 TYPE 2 DIABETES MELLITUS WITHOUT COMPLICATION, WITHOUT LONG-TERM CURRENT USE OF INSULIN (HCC): ICD-10-CM

## 2023-09-09 DIAGNOSIS — E05.00 GRAVES DISEASE: ICD-10-CM

## 2023-09-09 LAB
ALBUMIN SERPL BCG-MCNC: 4.3 G/DL (ref 3.5–5.1)
ALP SERPL-CCNC: 128 U/L (ref 38–126)
ALT SERPL W/O P-5'-P-CCNC: 8 U/L (ref 11–66)
ANION GAP SERPL CALC-SCNC: 10 MEQ/L (ref 8–16)
AST SERPL-CCNC: 14 U/L (ref 5–40)
BASOPHILS ABSOLUTE: 0.1 THOU/MM3 (ref 0–0.1)
BASOPHILS NFR BLD AUTO: 0.9 %
BILIRUB SERPL-MCNC: 0.3 MG/DL (ref 0.3–1.2)
BUN SERPL-MCNC: 7 MG/DL (ref 7–22)
CALCIUM SERPL-MCNC: 9.4 MG/DL (ref 8.5–10.5)
CHLORIDE SERPL-SCNC: 101 MEQ/L (ref 98–111)
CHOLESTEROL, FASTING: 159 MG/DL (ref 100–199)
CO2 SERPL-SCNC: 27 MEQ/L (ref 23–33)
CREAT SERPL-MCNC: 0.7 MG/DL (ref 0.4–1.2)
DEPRECATED MEAN GLUCOSE BLD GHB EST-ACNC: 120 MG/DL (ref 70–126)
DEPRECATED RDW RBC AUTO: 44.2 FL (ref 35–45)
EOSINOPHIL NFR BLD AUTO: 3.1 %
EOSINOPHILS ABSOLUTE: 0.3 THOU/MM3 (ref 0–0.4)
ERYTHROCYTE [DISTWIDTH] IN BLOOD BY AUTOMATED COUNT: 12.9 % (ref 11.5–14.5)
GFR SERPL CREATININE-BSD FRML MDRD: > 60 ML/MIN/1.73M2
GLUCOSE FASTING: 105 MG/DL (ref 70–108)
HBA1C MFR BLD HPLC: 6 % (ref 4.4–6.4)
HCT VFR BLD AUTO: 50.4 % (ref 37–47)
HDLC SERPL-MCNC: 42 MG/DL
HGB BLD-MCNC: 16.2 GM/DL (ref 12–16)
IMM GRANULOCYTES # BLD AUTO: 0.06 THOU/MM3 (ref 0–0.07)
IMM GRANULOCYTES NFR BLD AUTO: 0.5 %
LDLC SERPL CALC-MCNC: 95 MG/DL
LYMPHOCYTES ABSOLUTE: 3.2 THOU/MM3 (ref 1–4.8)
LYMPHOCYTES NFR BLD AUTO: 28.8 %
MCH RBC QN AUTO: 30.1 PG (ref 26–33)
MCHC RBC AUTO-ENTMCNC: 32.1 GM/DL (ref 32.2–35.5)
MCV RBC AUTO: 93.5 FL (ref 81–99)
MONOCYTES ABSOLUTE: 1.2 THOU/MM3 (ref 0.4–1.3)
MONOCYTES NFR BLD AUTO: 10.5 %
NEUTROPHILS NFR BLD AUTO: 56.2 %
NRBC BLD AUTO-RTO: 0 /100 WBC
PLATELET # BLD AUTO: 291 THOU/MM3 (ref 130–400)
PMV BLD AUTO: 9.9 FL (ref 9.4–12.4)
POTASSIUM SERPL-SCNC: 4.3 MEQ/L (ref 3.5–5.2)
PROT SERPL-MCNC: 7 G/DL (ref 6.1–8)
RBC # BLD AUTO: 5.39 MILL/MM3 (ref 4.2–5.4)
SEGMENTED NEUTROPHILS ABSOLUTE COUNT: 6.2 THOU/MM3 (ref 1.8–7.7)
SODIUM SERPL-SCNC: 138 MEQ/L (ref 135–145)
T4 FREE SERPL-MCNC: 1.17 NG/DL (ref 0.93–1.76)
TRIGLYCERIDE, FASTING: 108 MG/DL (ref 0–199)
TSH SERPL DL<=0.005 MIU/L-ACNC: 2.63 UIU/ML (ref 0.4–4.2)
WBC # BLD AUTO: 11.1 THOU/MM3 (ref 4.8–10.8)

## 2023-09-14 ENCOUNTER — TELEPHONE (OUTPATIENT)
Dept: FAMILY MEDICINE CLINIC | Age: 63
End: 2023-09-14

## 2023-09-14 NOTE — TELEPHONE ENCOUNTER
----- Message from Rosco Merlin, MD sent at 9/12/2023  6:13 PM EDT -----  Labs reviwed. Grossly normal, maybe mildly hemoconcentrated/dehydrated.   Otherwise normal.    Electronically signed by Rosco Merlin, MD on 9/12/2023 at 6:13 PM

## 2023-09-18 ENCOUNTER — NURSE ONLY (OUTPATIENT)
Dept: LAB | Age: 63
End: 2023-09-18

## 2023-09-18 DIAGNOSIS — E05.00 GRAVES DISEASE: ICD-10-CM

## 2023-09-20 LAB — TSI SER-ACNC: 1.45 IU/L

## 2023-10-02 ENCOUNTER — OFFICE VISIT (OUTPATIENT)
Dept: FAMILY MEDICINE CLINIC | Age: 63
End: 2023-10-02
Payer: MEDICARE

## 2023-10-02 VITALS
HEART RATE: 70 BPM | BODY MASS INDEX: 29.91 KG/M2 | OXYGEN SATURATION: 92 % | SYSTOLIC BLOOD PRESSURE: 118 MMHG | DIASTOLIC BLOOD PRESSURE: 70 MMHG | TEMPERATURE: 97.5 F | HEIGHT: 64 IN | RESPIRATION RATE: 12 BRPM | WEIGHT: 175.2 LBS

## 2023-10-02 DIAGNOSIS — E78.00 HYPERCHOLESTEROLEMIA: ICD-10-CM

## 2023-10-02 DIAGNOSIS — I10 ESSENTIAL HYPERTENSION: ICD-10-CM

## 2023-10-02 DIAGNOSIS — E11.9 TYPE 2 DIABETES MELLITUS WITHOUT COMPLICATION, WITHOUT LONG-TERM CURRENT USE OF INSULIN (HCC): ICD-10-CM

## 2023-10-02 PROCEDURE — 2022F DILAT RTA XM EVC RTNOPTHY: CPT

## 2023-10-02 PROCEDURE — 3044F HG A1C LEVEL LT 7.0%: CPT

## 2023-10-02 PROCEDURE — G8417 CALC BMI ABV UP PARAM F/U: HCPCS

## 2023-10-02 PROCEDURE — 3074F SYST BP LT 130 MM HG: CPT

## 2023-10-02 PROCEDURE — 3078F DIAST BP <80 MM HG: CPT

## 2023-10-02 PROCEDURE — 4004F PT TOBACCO SCREEN RCVD TLK: CPT

## 2023-10-02 PROCEDURE — 3017F COLORECTAL CA SCREEN DOC REV: CPT

## 2023-10-02 PROCEDURE — G8484 FLU IMMUNIZE NO ADMIN: HCPCS

## 2023-10-02 PROCEDURE — G8427 DOCREV CUR MEDS BY ELIG CLIN: HCPCS

## 2023-10-02 PROCEDURE — 99213 OFFICE O/P EST LOW 20 MIN: CPT

## 2023-10-02 RX ORDER — FLUTICASONE FUROATE, UMECLIDINIUM BROMIDE AND VILANTEROL TRIFENATATE 100; 62.5; 25 UG/1; UG/1; UG/1
1 POWDER RESPIRATORY (INHALATION) DAILY
COMMUNITY

## 2023-10-02 RX ORDER — BLOOD-GLUCOSE METER
1 KIT MISCELLANEOUS 3 TIMES DAILY
Qty: 100 EACH | Refills: 3 | Status: SHIPPED | OUTPATIENT
Start: 2023-10-02

## 2023-10-02 RX ORDER — EZETIMIBE 10 MG/1
10 TABLET ORAL DAILY
Qty: 90 TABLET | Refills: 1 | Status: SHIPPED | OUTPATIENT
Start: 2023-10-02

## 2023-10-02 RX ORDER — CARVEDILOL 25 MG/1
25 TABLET ORAL 2 TIMES DAILY WITH MEALS
Qty: 180 TABLET | Refills: 1 | Status: SHIPPED | OUTPATIENT
Start: 2023-10-02

## 2023-10-02 RX ORDER — AMLODIPINE BESYLATE 10 MG/1
10 TABLET ORAL DAILY
Qty: 90 TABLET | Refills: 1 | Status: SHIPPED | OUTPATIENT
Start: 2023-10-02

## 2023-10-02 NOTE — PROGRESS NOTES
Health Maintenance Due   Topic Date Due    Diabetic retinal exam  10/08/2016    Shingles vaccine (2 of 2) 02/03/2021    Diabetic Alb to Cr ratio (uACR) test  06/22/2022    Annual Wellness Visit (AWV)  07/15/2022    Flu vaccine (1) 08/01/2023

## 2023-10-02 NOTE — PATIENT INSTRUCTIONS
Thank you   Thank you for trusting us with your healthcare needs. You may receive a survey regarding today's visit. It would help us out if you would take a few moments to provide your feedback. We value your input. Please bring in ALL medications BOTTLES, including inhalers, herbal supplements, over the counter, prescribed & non-prescribed medicine. The office would like actual medication bottles and a list.   Please note our OFFICE POLICIES:   Prior to getting your labs drawn, please check with your insurance company for benefits and eligibility of lab services. Often, insurance companies cover certain tests for preventative visits only. It is patient's responsibility to see what is covered. We are unable to change a diagnosis after the test has been performed. Please hold onto your original lab orders and take them to your lab to be completed. If you no show your scheduled appointment three times, you will be dismissed from this practice. Reschedules must be completed 24 hours prior to your schedule appointment. If the list below has been completed, PLEASE FAX RECORDS TO OUR OFFICE @ [phone].  Once the records have been received we will update your records at our office:  Health Maintenance Due   Topic Date Due    Diabetic retinal exam  10/08/2016    Shingles vaccine (2 of 2) 02/03/2021    Diabetic Alb to Cr ratio (uACR) test  06/22/2022    Annual Wellness Visit (AWV)  07/15/2022    Flu vaccine (1) 08/01/2023

## 2023-10-03 ASSESSMENT — ENCOUNTER SYMPTOMS
COUGH: 0
CONSTIPATION: 0
PHOTOPHOBIA: 0
VOICE CHANGE: 0
RHINORRHEA: 0
BLOOD IN STOOL: 0
ABDOMINAL PAIN: 0
NAUSEA: 0
SINUS PRESSURE: 0
TROUBLE SWALLOWING: 0
WHEEZING: 0
SINUS PAIN: 0
DIARRHEA: 0
SHORTNESS OF BREATH: 0
VOMITING: 0
SORE THROAT: 0

## 2023-10-03 NOTE — PROGRESS NOTES
Judith Glass (:  1960) is a 66 y.o. female,Established patient, here for evaluation of the following chief complaint(s):  3 Month Follow-Up (Review labs completed 2023 also discuss starting on Ozempic)         ASSESSMENT/PLAN:  1. Type 2 diabetes mellitus without complication, without long-term current use of insulin (HCC)  -     blood glucose test strips (FREESTYLE LITE) strip; 1 each by Does not apply route 3 times daily Test blood sugar three times daily. , Disp-100 each, R-3Normal  -     SITagliptin (JANUVIA) 100 MG tablet; Take 1 tablet by mouth daily, Disp-90 tablet, R-1Normal  2. Essential hypertension  -     carvedilol (COREG) 25 MG tablet; Take 1 tablet by mouth 2 times daily (with meals), Disp-180 tablet, R-1Normal  -     amLODIPine (NORVASC) 10 MG tablet; Take 1 tablet by mouth daily, Disp-90 tablet, R-1Normal  3. Hypercholesterolemia  -     SITagliptin (JANUVIA) 100 MG tablet; Take 1 tablet by mouth daily, Disp-90 tablet, R-1Normal  -     ezetimibe (ZETIA) 10 MG tablet; Take 1 tablet by mouth daily, Disp-90 tablet, R-1Normal    Labs reviewd, a1c stable at 6.0 unchanged from . Pt defer change of januvia to ozyempic,  Pt amicable to no changes at this time  HTN controlled  Endocrinology for Graves. Refills given    Return in about 3 months (around 2024) for amwV . Subjective   SUBJECTIVE/OBJECTIVE:  HPI    Pt is a 67 y/o female w/ PM DM2, HTN, HLD, Graves, CBP, COPD, nocturnal muscle  cramps. Presenting for follow up DM2, Htn, lab f/u. Pt doing well no acute complaints does want to decrease weight however and is interested in ozyemic. Discussed R/b/a of ozyemic, as pt BMI < 35, DM2 well controlled, and Pt is already on Januvia would potentially need to swap out Januvia to Ozemypic ,however Pt decliens at this time. Recommended caloric restriction considering BMR of 1900 Kcal/colin. Otherwise BP controlled, labs WNL,.     Review of Systems   Constitutional:  Negative for

## 2023-12-07 ENCOUNTER — HOSPITAL ENCOUNTER (OUTPATIENT)
Dept: PULMONOLOGY | Age: 63
Discharge: HOME | End: 2023-12-07
Attending: INTERNAL MEDICINE
Payer: MEDICARE

## 2023-12-07 DIAGNOSIS — J44.9 COPD, SEVERE (HCC): ICD-10-CM

## 2023-12-07 PROCEDURE — 94060 EVALUATION OF WHEEZING: CPT

## 2023-12-27 ENCOUNTER — OFFICE VISIT (OUTPATIENT)
Dept: PULMONOLOGY | Age: 63
End: 2023-12-27
Payer: MEDICARE

## 2023-12-27 VITALS
HEART RATE: 72 BPM | BODY MASS INDEX: 30.49 KG/M2 | SYSTOLIC BLOOD PRESSURE: 112 MMHG | WEIGHT: 178.6 LBS | TEMPERATURE: 97.9 F | HEIGHT: 64 IN | DIASTOLIC BLOOD PRESSURE: 62 MMHG | OXYGEN SATURATION: 93 %

## 2023-12-27 DIAGNOSIS — R91.8 MULTIPLE LUNG NODULES ON CT: ICD-10-CM

## 2023-12-27 DIAGNOSIS — Z87.891 PERSONAL HISTORY OF TOBACCO USE: Primary | ICD-10-CM

## 2023-12-27 DIAGNOSIS — F17.218 CIGARETTE NICOTINE DEPENDENCE WITH OTHER NICOTINE-INDUCED DISORDER: ICD-10-CM

## 2023-12-27 DIAGNOSIS — J44.9 COPD, SEVERE (HCC): ICD-10-CM

## 2023-12-27 PROCEDURE — 3023F SPIROM DOC REV: CPT | Performed by: INTERNAL MEDICINE

## 2023-12-27 PROCEDURE — G8484 FLU IMMUNIZE NO ADMIN: HCPCS | Performed by: INTERNAL MEDICINE

## 2023-12-27 PROCEDURE — 3017F COLORECTAL CA SCREEN DOC REV: CPT | Performed by: INTERNAL MEDICINE

## 2023-12-27 PROCEDURE — 3078F DIAST BP <80 MM HG: CPT | Performed by: INTERNAL MEDICINE

## 2023-12-27 PROCEDURE — G0296 VISIT TO DETERM LDCT ELIG: HCPCS | Performed by: INTERNAL MEDICINE

## 2023-12-27 PROCEDURE — 4004F PT TOBACCO SCREEN RCVD TLK: CPT | Performed by: INTERNAL MEDICINE

## 2023-12-27 PROCEDURE — 99213 OFFICE O/P EST LOW 20 MIN: CPT | Performed by: INTERNAL MEDICINE

## 2023-12-27 PROCEDURE — G8427 DOCREV CUR MEDS BY ELIG CLIN: HCPCS | Performed by: INTERNAL MEDICINE

## 2023-12-27 PROCEDURE — 3074F SYST BP LT 130 MM HG: CPT | Performed by: INTERNAL MEDICINE

## 2023-12-27 PROCEDURE — G8417 CALC BMI ABV UP PARAM F/U: HCPCS | Performed by: INTERNAL MEDICINE

## 2023-12-27 RX ORDER — ALBUTEROL SULFATE 90 UG/1
2 AEROSOL, METERED RESPIRATORY (INHALATION) EVERY 6 HOURS PRN
Qty: 1 EACH | Refills: 11 | Status: SHIPPED | OUTPATIENT
Start: 2023-12-27

## 2023-12-27 RX ORDER — FLUTICASONE FUROATE, UMECLIDINIUM BROMIDE AND VILANTEROL TRIFENATATE 100; 62.5; 25 UG/1; UG/1; UG/1
1 POWDER RESPIRATORY (INHALATION) DAILY
Qty: 1 EACH | Refills: 5 | Status: SHIPPED | OUTPATIENT
Start: 2023-12-27 | End: 2024-01-26

## 2023-12-27 NOTE — PROGRESS NOTES
Neck Circumference -   16  Mallampati - 4    Lung Nodule Screening     [x] Qualifies    [] Does not qualify   [] Declined    [] Completed
flat.      Palpations: Abdomen is soft. Tenderness: There is no abdominal tenderness. Musculoskeletal:      Right lower leg: No edema. Left lower leg: No edema. Neurological:      General: No focal deficit present. Mental Status: She is alert and oriented to person, place, and time. Psychiatric:         Mood and Affect: Mood normal.         Behavior: Behavior normal.            Immunization History   Administered Date(s) Administered    COVID-19, PFIZER Bivalent, DO NOT Dilute, (age 12y+), IM, 30 mcg/0.3 mL 11/26/2022    COVID-19, PFIZER GRAY top, DO NOT Dilute, (age 15 y+), IM, 30 mcg/0.3 mL 07/30/2022    COVID-19, PFIZER PURPLE top, DILUTE for use, (age 15 y+), 30mcg/0.3mL 01/08/2022, 01/29/2022    COVID-19, PFIZER, (2023-24 formula), (age 12y+), IM, 30mcg/0.3mL 11/26/2023    Hepatitis B (Recombivax HB) 10/08/2015, 12/21/2015, 04/25/2016    Influenza Virus Vaccine 10/16/2014, 10/08/2015    Influenza, AFLURIA (age 1 yrs+), FLUZONE, (age 10 mo+), MDV, 0.5mL 09/21/2016, 01/14/2019, 10/30/2019    Influenza, FLUARIX, FLULAVAL, FLUZONE (age 10 mo+) AND AFLURIA, (age 1 y+), PF, 0.5mL 11/09/2020    Influenza, FLUCELVAX, (age 10 mo+), MDCK, PF, 0.5mL 01/14/2022, 11/26/2022    Pneumococcal, PCV-13, PREVNAR 13, (age 6w+), IM, 0.5mL 08/13/2015    Pneumococcal, PPSV23, PNEUMOVAX 23, (age 2y+), SC/IM, 0.5mL 11/14/2016, 01/14/2022    TDaP, ADACEL (age 6y-58y), BOOSTRIX (age 10y+), IM, 0.5mL 08/13/2015    Zoster Recombinant (Shingrix) 12/09/2020       Health Maintenance Due   Topic Date Due    Diabetic retinal exam  10/08/2016    Shingles vaccine (2 of 2) 02/03/2021    Respiratory Syncytial Virus (RSV) Pregnant or age 61 yrs+ (1 - 1-dose 60+ series) Never done    Diabetic Alb to Cr ratio (uACR) test  06/22/2022    Annual Wellness Visit (AWV)  07/15/2022    Diabetic foot exam  11/30/2023    Depression Monitoring  01/20/2024         Spirometry:            Assessment / Plan:       Diagnosis Orders   1.  Personal

## 2024-01-15 ENCOUNTER — OFFICE VISIT (OUTPATIENT)
Dept: FAMILY MEDICINE CLINIC | Age: 64
End: 2024-01-15
Payer: MEDICARE

## 2024-01-15 VITALS
BODY MASS INDEX: 30.83 KG/M2 | HEIGHT: 64 IN | OXYGEN SATURATION: 92 % | RESPIRATION RATE: 16 BRPM | WEIGHT: 180.6 LBS | HEART RATE: 75 BPM | SYSTOLIC BLOOD PRESSURE: 118 MMHG | DIASTOLIC BLOOD PRESSURE: 70 MMHG | TEMPERATURE: 98.1 F

## 2024-01-15 DIAGNOSIS — J44.9 CHRONIC OBSTRUCTIVE PULMONARY DISEASE, UNSPECIFIED COPD TYPE (HCC): ICD-10-CM

## 2024-01-15 DIAGNOSIS — I10 ESSENTIAL HYPERTENSION: ICD-10-CM

## 2024-01-15 DIAGNOSIS — Z00.00 MEDICARE ANNUAL WELLNESS VISIT, SUBSEQUENT: Primary | ICD-10-CM

## 2024-01-15 DIAGNOSIS — E05.00 GRAVES DISEASE: ICD-10-CM

## 2024-01-15 DIAGNOSIS — E11.9 TYPE 2 DIABETES MELLITUS WITHOUT COMPLICATION, WITHOUT LONG-TERM CURRENT USE OF INSULIN (HCC): ICD-10-CM

## 2024-01-15 DIAGNOSIS — M54.50 CHRONIC LOW BACK PAIN, UNSPECIFIED BACK PAIN LATERALITY, UNSPECIFIED WHETHER SCIATICA PRESENT: ICD-10-CM

## 2024-01-15 DIAGNOSIS — G89.29 CHRONIC LOW BACK PAIN, UNSPECIFIED BACK PAIN LATERALITY, UNSPECIFIED WHETHER SCIATICA PRESENT: ICD-10-CM

## 2024-01-15 DIAGNOSIS — F32.5 MAJOR DEPRESSIVE DISORDER WITH SINGLE EPISODE, IN FULL REMISSION (HCC): ICD-10-CM

## 2024-01-15 PROCEDURE — 99214 OFFICE O/P EST MOD 30 MIN: CPT

## 2024-01-15 PROCEDURE — 2022F DILAT RTA XM EVC RTNOPTHY: CPT

## 2024-01-15 PROCEDURE — G0439 PPPS, SUBSEQ VISIT: HCPCS

## 2024-01-15 PROCEDURE — 3074F SYST BP LT 130 MM HG: CPT

## 2024-01-15 PROCEDURE — G8427 DOCREV CUR MEDS BY ELIG CLIN: HCPCS

## 2024-01-15 PROCEDURE — 3023F SPIROM DOC REV: CPT

## 2024-01-15 PROCEDURE — 4004F PT TOBACCO SCREEN RCVD TLK: CPT

## 2024-01-15 PROCEDURE — G8417 CALC BMI ABV UP PARAM F/U: HCPCS

## 2024-01-15 PROCEDURE — G8482 FLU IMMUNIZE ORDER/ADMIN: HCPCS

## 2024-01-15 PROCEDURE — 3046F HEMOGLOBIN A1C LEVEL >9.0%: CPT

## 2024-01-15 PROCEDURE — 3017F COLORECTAL CA SCREEN DOC REV: CPT

## 2024-01-15 PROCEDURE — 3078F DIAST BP <80 MM HG: CPT

## 2024-01-15 RX ORDER — NEOMYCIN SULFATE, POLYMYXIN B SULFATE AND DEXAMETHASONE 3.5; 10000; 1 MG/ML; [USP'U]/ML; MG/ML
SUSPENSION/ DROPS OPHTHALMIC
COMMUNITY
Start: 2024-01-04

## 2024-01-15 ASSESSMENT — PATIENT HEALTH QUESTIONNAIRE - PHQ9
SUM OF ALL RESPONSES TO PHQ QUESTIONS 1-9: 1
10. IF YOU CHECKED OFF ANY PROBLEMS, HOW DIFFICULT HAVE THESE PROBLEMS MADE IT FOR YOU TO DO YOUR WORK, TAKE CARE OF THINGS AT HOME, OR GET ALONG WITH OTHER PEOPLE: 0
4. FEELING TIRED OR HAVING LITTLE ENERGY: 1
SUM OF ALL RESPONSES TO PHQ QUESTIONS 1-9: 1
9. THOUGHTS THAT YOU WOULD BE BETTER OFF DEAD, OR OF HURTING YOURSELF: 0
1. LITTLE INTEREST OR PLEASURE IN DOING THINGS: 0
SUM OF ALL RESPONSES TO PHQ QUESTIONS 1-9: 1
7. TROUBLE CONCENTRATING ON THINGS, SUCH AS READING THE NEWSPAPER OR WATCHING TELEVISION: 0
SUM OF ALL RESPONSES TO PHQ QUESTIONS 1-9: 1
8. MOVING OR SPEAKING SO SLOWLY THAT OTHER PEOPLE COULD HAVE NOTICED. OR THE OPPOSITE, BEING SO FIGETY OR RESTLESS THAT YOU HAVE BEEN MOVING AROUND A LOT MORE THAN USUAL: 0
2. FEELING DOWN, DEPRESSED OR HOPELESS: 0
3. TROUBLE FALLING OR STAYING ASLEEP: 0
5. POOR APPETITE OR OVEREATING: 0
SUM OF ALL RESPONSES TO PHQ9 QUESTIONS 1 & 2: 0
6. FEELING BAD ABOUT YOURSELF - OR THAT YOU ARE A FAILURE OR HAVE LET YOURSELF OR YOUR FAMILY DOWN: 0

## 2024-01-15 ASSESSMENT — LIFESTYLE VARIABLES
HOW OFTEN DO YOU HAVE A DRINK CONTAINING ALCOHOL: NEVER
HOW MANY STANDARD DRINKS CONTAINING ALCOHOL DO YOU HAVE ON A TYPICAL DAY: PATIENT DOES NOT DRINK

## 2024-01-15 NOTE — PROGRESS NOTES
S: 63 y.o. female with   Chief Complaint   Patient presents with    Medicare AWV       HPI: please see resident note for HPI and ROS.    BP Readings from Last 3 Encounters:   01/15/24 118/70   12/27/23 112/62   10/02/23 118/70     Wt Readings from Last 3 Encounters:   01/15/24 81.9 kg (180 lb 9.6 oz)   12/27/23 81 kg (178 lb 9.6 oz)   10/02/23 79.5 kg (175 lb 3.2 oz)       O: VS:  height is 1.626 m (5' 4.02\") and weight is 81.9 kg (180 lb 9.6 oz). Her oral temperature is 98.1 °F (36.7 °C). Her blood pressure is 118/70 and her pulse is 75. Her respiration is 16 and oxygen saturation is 92%.   AAO/NAD, appropriate affect for mood  CV:  RRR, no murmur  Resp: CTAB     Diagnosis Orders   1. Medicare annual wellness visit, subsequent  Comprehensive Metabolic Panel, Fasting    CBC with Auto Differential    Hemoglobin A1C    TSH With Reflex Ft4    Lipid, Fasting    Microalbumin / Creatinine Urine Ratio      2. Type 2 diabetes mellitus without complication, without long-term current use of insulin (HCC)  Semaglutide,0.25 or 0.5MG/DOS, 2 MG/1.5ML SOPN    Comprehensive Metabolic Panel, Fasting    CBC with Auto Differential    Hemoglobin A1C    TSH With Reflex Ft4    Lipid, Fasting    Microalbumin / Creatinine Urine Ratio      3. Essential hypertension  TSH With Reflex Ft4    Lipid, Fasting      4. Graves disease  TSH With Reflex Ft4    Lipid, Fasting      5. Major depressive disorder with single episode, in full remission ([facility])        6. Chronic low back pain, unspecified back pain laterality, unspecified whether sciatica present  Handicap Placard MISC      7. Chronic obstructive pulmonary disease, unspecified COPD type (HCC)  Handicap Placard MISC          Plan:  Please refer to resident note for full plan.    63 year old female presents to the office for Medicare annual wellness.  Chronic conditions reviewed as above.  Medications reviewed in detail.  Will plan to obtain fasting blood work as above for review.  Patient also

## 2024-01-15 NOTE — PROGRESS NOTES
Health Maintenance Due   Topic Date Due    Diabetic retinal exam  10/08/2016    Shingles vaccine (2 of 2) 02/03/2021    Respiratory Syncytial Virus (RSV) Pregnant or age 60 yrs+ (1 - 1-dose 60+ series) Never done    Diabetic Alb to Cr ratio (uACR) test  06/22/2022    Annual Wellness Visit (Medicare)  11/27/2023    Diabetic foot exam  11/30/2023    Depression Monitoring  01/20/2024

## 2024-01-15 NOTE — PROGRESS NOTES
Medicare Annual Wellness Visit    Emmy Badillo is here for Medicare AWV    Assessment & Plan   Medicare annual wellness visit, subsequent  -     Comprehensive Metabolic Panel, Fasting; Future  -     CBC with Auto Differential; Future  -     Hemoglobin A1C; Future  -     TSH With Reflex Ft4; Future  -     Lipid, Fasting; Future  Type 2 diabetes mellitus without complication, without long-term current use of insulin (HCC)  -     Semaglutide,0.25 or 0.5MG/DOS, 2 MG/1.5ML SOPN; Inject 0.25 mg into the skin once a week for 30 days, THEN 0.5 mg once a week for 30 days, THEN 1 mg once a week., Disp-4 Adjustable Dose Pre-filled Pen Syringe, R-2Normal  -     Comprehensive Metabolic Panel, Fasting; Future  -     CBC with Auto Differential; Future  -     Hemoglobin A1C; Future  -     TSH With Reflex Ft4; Future  -     Lipid, Fasting; Future  Essential hypertension  -     TSH With Reflex Ft4; Future  -     Lipid, Fasting; Future  Graves disease  -     TSH With Reflex Ft4; Future  -     Lipid, Fasting; Future  Major depressive disorder with single episode, in full remission (HCC)  Chronic low back pain, unspecified back pain laterality, unspecified whether sciatica present  -     Handicap Placard MISC; Starting Mon 1/15/2024, Disp-1 each, R-0, PrintIt is my medical opinion that Emmy Badillo requires a disability parking placard for the following reasons: She cannot walk 200 feet without stopping to rest. She has limited walking ability due to an orthopedic condition. Duration of need: 5 years  Chronic obstructive pulmonary disease, unspecified COPD type (HCC)  -     Handicap Placard MISC; Starting Mon 1/15/2024, Disp-1 each, R-0, PrintIt is my medical opinion that Emmy Badillo requires a disability parking placard for the following reasons: She cannot walk 200 feet without stopping to rest. She has limited walking ability due to an orthopedic condition. Duration of need: 5 years    Start semaglutide 0.25, with monthly

## 2024-01-15 NOTE — PATIENT INSTRUCTIONS
these groups. Or look for information online.  Your local community, friends, and family.  Supportive relationships  A supportive relationship includes emotional support such as love, trust, and understanding, as well as advice and concrete help, such as help managing your time.  Reach out to others  Family and friends can help you. Ask them to:  Listen to you and give you encouragement. This can keep you from feeling hopeless or alone.  Help with small daily tasks or with bigger problems. A helping hand can keep you from feeling overwhelmed.  Help you manage a health problem. For example, ask them to go to doctor visits with you. Your loved ones can offer support by being involved in your medical care.  Respect your relationships  A good relationship is also a two-way street. You count on help from others, but they also count on you.  Know your friends' limits. You don't have to see or call your friends every day. If you are going through a rough patch, ask friends if you can contact them outside of the usual boundaries.  Don't always complain or talk about yourself. Know when it's time to stop talking and listen or just enjoy your friend's company.  Know that good friends can be a bad influence. For example, if a friend encourages you to drink when you know it will harm you, you may want to end the friendship.  Where can you learn more?  Go to https://www."[employer].net/patientEd and enter G092 to learn more about \"Learning About Emotional Support.\"  Current as of: June 25, 2023               Content Version: 13.9  © 6204-5239 [employer].   Care instructions adapted under license by [employer]. If you have questions about a medical condition or this instruction, always ask your healthcare professional. [employer] disclaims any warranty or liability for your use of this information.           Eating Healthy Foods: Care Instructions  With every meal, you can make healthy food choices.

## 2024-01-15 NOTE — PROGRESS NOTES
Brief Resident Attestation Note  Please refer to the main resident's note from today for full complete progress note.  Below is a brief note from a senior resident.    Patient:Emmy Badillo  YOB: 1960   MRN:[id number]    Subjective   63 y.o. female who presents for the following: Medicare AWV    Diabetes  She presents for her follow-up diabetic visit. She has type 2 diabetes mellitus. Her disease course has been stable. Pertinent negatives for diabetes include no fatigue. Symptoms are stable. Risk factors for coronary artery disease include obesity and tobacco exposure.     Review of Systems   Constitutional:  Negative for fatigue, fever and unexpected weight change.   All other systems reviewed and are negative.    PMHx: She has a past medical history of COPD (chronic obstructive pulmonary disease) (HCC), Degenerative disc disease, Depression, Diverticulitis, Graves disease, Hyperlipidemia, Hypertension, Spinal stenosis, Type II or unspecified type diabetes mellitus without mention of complication, not stated as uncontrolled, and Vitamin D deficiency.    Objective     Vitals:    01/15/24 1041   BP: 118/70   Site: Right Upper Arm   Position: Sitting   Cuff Size: Medium Adult   Pulse: 75   Resp: 16   Temp: 98.1 °F (36.7 °C)   TempSrc: Oral   SpO2: 92%   Weight: 81.9 kg (180 lb 9.6 oz)   Height: 1.626 m (5' 4.02\")    Body mass index is 30.98 kg/m².    Physical Exam: Please see note from the main resident of this encounter    Most Recent Data:  Lab Results   Component Value Date    WBC 11.1 (H) 09/09/2023    HGB 16.2 (H) 09/09/2023    HCT 50.4 (H) 09/09/2023     09/09/2023    CHOL 227 (H) 03/20/2023    TRIG 154 03/20/2023    HDL 42 09/09/2023    ALT 8 (L) 09/09/2023    AST 14 09/09/2023     09/09/2023     09/09/2023    K 4.3 09/09/2023    CREATININE 0.7 09/09/2023    BUN 7 09/09/2023    CO2 27 09/09/2023    TSH 2.630 09/09/2023    GLUF 105 09/09/2023    LABA1C 6.0 09/09/2023     BP

## 2024-01-19 ENCOUNTER — TELEPHONE (OUTPATIENT)
Dept: FAMILY MEDICINE CLINIC | Age: 64
End: 2024-01-19

## 2024-01-19 DIAGNOSIS — E11.9 TYPE 2 DIABETES MELLITUS WITHOUT COMPLICATION, WITHOUT LONG-TERM CURRENT USE OF INSULIN (HCC): ICD-10-CM

## 2024-01-19 NOTE — TELEPHONE ENCOUNTER
Emmy Field called and states that the pharmacy informed her that the script written for Ozempic needs written for each dose separately. Please send new scripts separately.

## 2024-01-22 NOTE — TELEPHONE ENCOUNTER
Ordered as separate entities.    Electronically signed by Faisal Arnold MD on 1/22/2024 at 5:29 PM

## 2024-03-12 ENCOUNTER — TELEPHONE (OUTPATIENT)
Dept: FAMILY MEDICINE CLINIC | Age: 64
End: 2024-03-12

## 2024-03-12 NOTE — TELEPHONE ENCOUNTER
----- Message from Nola Brooke sent at 3/12/2024  1:24 PM EDT -----  Subject: Message to Provider    QUESTIONS  Information for Provider? Patient would like if clinical staff could call   back with questions and concerns she has about Pre Authorization she needs   for Ozempic,   ---------------------------------------------------------------------------  --------------  CALL BACK INFO  [phone]; OK to leave message on voicemail  ---------------------------------------------------------------------------  --------------  SCRIPT ANSWERS  Relationship to Patient? Self

## 2024-03-12 NOTE — TELEPHONE ENCOUNTER
May need appointment to discuss, otherwise more information required for provider.  Clinical staff please call patient and obtain more information for provider regarding what questions has and also find status of pre authorization.    Electronically signed by Faisal Arnold MD on 3/12/2024 at 3:33 PM

## 2024-03-13 ENCOUNTER — TELEPHONE (OUTPATIENT)
Dept: FAMILY MEDICINE CLINIC | Age: 64
End: 2024-03-13

## 2024-03-13 NOTE — TELEPHONE ENCOUNTER
Pt returned call, called her insurance and was informed that a PA will need to be completed over [facility], was provided number [phone]. Called and started PA, finished additional clinic questions with representative, Was approved! Coverage start date 2/12/2024-3/13/2025. Approval letter will be faxed to us and mailed to patient.   Called pharmacy to verify approval iss reflecting on their end, it is, but ozempic 1mg dose is on back order, will notify pt when this is available. Pt can start on 0.25mg dose of ozemoic which is also approved in the meantime til 1mg is in stock, called to inform pt, no answer, no VM set up.    Case# [id number]

## 2024-03-13 NOTE — TELEPHONE ENCOUNTER
Patient called checking status of Ozempic. Let patient know status is showing Member not found in CMM. Verified insurance with patient. Patient is going to call Rashid to see what the issue is. Told her we would also call Cover My Meds.

## 2024-03-18 ENCOUNTER — TELEPHONE (OUTPATIENT)
Dept: FAMILY MEDICINE CLINIC | Age: 64
End: 2024-03-18

## 2024-03-18 ENCOUNTER — OFFICE VISIT (OUTPATIENT)
Age: 64
End: 2024-03-18
Payer: MEDICARE

## 2024-03-18 VITALS
RESPIRATION RATE: 16 BRPM | SYSTOLIC BLOOD PRESSURE: 132 MMHG | DIASTOLIC BLOOD PRESSURE: 86 MMHG | WEIGHT: 178.6 LBS | BODY MASS INDEX: 30.49 KG/M2 | HEART RATE: 71 BPM | HEIGHT: 64 IN

## 2024-03-18 DIAGNOSIS — E05.00 GRAVES DISEASE: ICD-10-CM

## 2024-03-18 DIAGNOSIS — E78.2 MIXED HYPERLIPIDEMIA: ICD-10-CM

## 2024-03-18 DIAGNOSIS — E11.65 TYPE 2 DIABETES MELLITUS WITH HYPERGLYCEMIA, WITHOUT LONG-TERM CURRENT USE OF INSULIN (HCC): Primary | ICD-10-CM

## 2024-03-18 PROCEDURE — 3046F HEMOGLOBIN A1C LEVEL >9.0%: CPT | Performed by: INTERNAL MEDICINE

## 2024-03-18 PROCEDURE — 99213 OFFICE O/P EST LOW 20 MIN: CPT | Performed by: INTERNAL MEDICINE

## 2024-03-18 PROCEDURE — 4004F PT TOBACCO SCREEN RCVD TLK: CPT | Performed by: INTERNAL MEDICINE

## 2024-03-18 PROCEDURE — 3079F DIAST BP 80-89 MM HG: CPT | Performed by: INTERNAL MEDICINE

## 2024-03-18 PROCEDURE — 2022F DILAT RTA XM EVC RTNOPTHY: CPT | Performed by: INTERNAL MEDICINE

## 2024-03-18 PROCEDURE — G8417 CALC BMI ABV UP PARAM F/U: HCPCS | Performed by: INTERNAL MEDICINE

## 2024-03-18 PROCEDURE — 3075F SYST BP GE 130 - 139MM HG: CPT | Performed by: INTERNAL MEDICINE

## 2024-03-18 PROCEDURE — 3017F COLORECTAL CA SCREEN DOC REV: CPT | Performed by: INTERNAL MEDICINE

## 2024-03-18 PROCEDURE — G8482 FLU IMMUNIZE ORDER/ADMIN: HCPCS | Performed by: INTERNAL MEDICINE

## 2024-03-18 PROCEDURE — G8427 DOCREV CUR MEDS BY ELIG CLIN: HCPCS | Performed by: INTERNAL MEDICINE

## 2024-03-18 NOTE — TELEPHONE ENCOUNTER
[facility] from [facility] . Forms with questions regarding the patient that need to be completed and faxed back for the patient Ozempic Pen Injectr. Forms are scanned in to patient media , attached, and placed in PCP mailbox for completion .

## 2024-04-06 ENCOUNTER — NURSE ONLY (OUTPATIENT)
Dept: LAB | Age: 64
End: 2024-04-06

## 2024-04-06 DIAGNOSIS — Z00.00 MEDICARE ANNUAL WELLNESS VISIT, SUBSEQUENT: ICD-10-CM

## 2024-04-06 DIAGNOSIS — I10 ESSENTIAL HYPERTENSION: ICD-10-CM

## 2024-04-06 DIAGNOSIS — E11.9 TYPE 2 DIABETES MELLITUS WITHOUT COMPLICATION, WITHOUT LONG-TERM CURRENT USE OF INSULIN (HCC): ICD-10-CM

## 2024-04-06 DIAGNOSIS — E05.00 GRAVES DISEASE: ICD-10-CM

## 2024-04-06 LAB
ALBUMIN SERPL BCG-MCNC: 4.2 G/DL (ref 3.5–5.1)
ALP SERPL-CCNC: 132 U/L (ref 38–126)
ALT SERPL W/O P-5'-P-CCNC: 11 U/L (ref 11–66)
ANION GAP SERPL CALC-SCNC: 13 MEQ/L (ref 8–16)
AST SERPL-CCNC: 14 U/L (ref 5–40)
BASOPHILS ABSOLUTE: 0.1 THOU/MM3 (ref 0–0.1)
BASOPHILS NFR BLD AUTO: 0.9 %
BILIRUB SERPL-MCNC: 0.4 MG/DL (ref 0.3–1.2)
BUN SERPL-MCNC: 10 MG/DL (ref 7–22)
CALCIUM SERPL-MCNC: 9.1 MG/DL (ref 8.5–10.5)
CHLORIDE SERPL-SCNC: 100 MEQ/L (ref 98–111)
CHOLESTEROL, FASTING: 213 MG/DL (ref 100–199)
CO2 SERPL-SCNC: 24 MEQ/L (ref 23–33)
CREAT SERPL-MCNC: 0.6 MG/DL (ref 0.4–1.2)
DEPRECATED MEAN GLUCOSE BLD GHB EST-ACNC: 123 MG/DL (ref 70–126)
DEPRECATED RDW RBC AUTO: 43.6 FL (ref 35–45)
EOSINOPHIL NFR BLD AUTO: 3 %
EOSINOPHILS ABSOLUTE: 0.3 THOU/MM3 (ref 0–0.4)
ERYTHROCYTE [DISTWIDTH] IN BLOOD BY AUTOMATED COUNT: 13 % (ref 11.5–14.5)
GFR SERPL CREATININE-BSD FRML MDRD: > 90 ML/MIN/1.73M2
GLUCOSE FASTING: 93 MG/DL (ref 70–108)
HBA1C MFR BLD HPLC: 6.1 % (ref 4.4–6.4)
HCT VFR BLD AUTO: 48.9 % (ref 37–47)
HDLC SERPL-MCNC: 47 MG/DL
HGB BLD-MCNC: 16.3 GM/DL (ref 12–16)
IMM GRANULOCYTES # BLD AUTO: 0.04 THOU/MM3 (ref 0–0.07)
IMM GRANULOCYTES NFR BLD AUTO: 0.4 %
LDLC SERPL CALC-MCNC: 138 MG/DL
LYMPHOCYTES ABSOLUTE: 3.6 THOU/MM3 (ref 1–4.8)
LYMPHOCYTES NFR BLD AUTO: 33.6 %
MCH RBC QN AUTO: 30.5 PG (ref 26–33)
MCHC RBC AUTO-ENTMCNC: 33.3 GM/DL (ref 32.2–35.5)
MCV RBC AUTO: 91.6 FL (ref 81–99)
MONOCYTES ABSOLUTE: 1.1 THOU/MM3 (ref 0.4–1.3)
MONOCYTES NFR BLD AUTO: 10.8 %
NEUTROPHILS NFR BLD AUTO: 51.3 %
NRBC BLD AUTO-RTO: 0 /100 WBC
PLATELET # BLD AUTO: 297 THOU/MM3 (ref 130–400)
PMV BLD AUTO: 9.5 FL (ref 9.4–12.4)
POTASSIUM SERPL-SCNC: 4.3 MEQ/L (ref 3.5–5.2)
PROT SERPL-MCNC: 7.4 G/DL (ref 6.1–8)
RBC # BLD AUTO: 5.34 MILL/MM3 (ref 4.2–5.4)
SEGMENTED NEUTROPHILS ABSOLUTE COUNT: 5.4 THOU/MM3 (ref 1.8–7.7)
SODIUM SERPL-SCNC: 137 MEQ/L (ref 135–145)
TRIGLYCERIDE, FASTING: 141 MG/DL (ref 0–199)
TSH SERPL DL<=0.005 MIU/L-ACNC: 2.59 UIU/ML (ref 0.4–4.2)
WBC # BLD AUTO: 10.6 THOU/MM3 (ref 4.8–10.8)

## 2024-04-11 LAB — TSI SER-ACNC: 1.4 IU/L

## 2024-04-15 ENCOUNTER — TELEPHONE (OUTPATIENT)
Dept: FAMILY MEDICINE CLINIC | Age: 64
End: 2024-04-15

## 2024-04-15 NOTE — TELEPHONE ENCOUNTER
----- Message from Faisal Arnold MD sent at 4/15/2024  8:20 AM EDT -----  Labs reviewd,  Some gallbladder markers elevated.  We can consider an ultrasound of the gallbladder  A1c stable.  Cholesterol is elevated  Will discuss at next office visit.    Electronically signed by Faisal Arnold MD on 4/15/2024 at 8:20 AM

## 2024-04-16 ENCOUNTER — OFFICE VISIT (OUTPATIENT)
Dept: FAMILY MEDICINE CLINIC | Age: 64
End: 2024-04-16
Payer: MEDICARE

## 2024-04-16 VITALS
RESPIRATION RATE: 15 BRPM | DIASTOLIC BLOOD PRESSURE: 74 MMHG | SYSTOLIC BLOOD PRESSURE: 128 MMHG | HEART RATE: 70 BPM | BODY MASS INDEX: 31.24 KG/M2 | OXYGEN SATURATION: 93 % | HEIGHT: 64 IN | WEIGHT: 183 LBS | TEMPERATURE: 97.8 F

## 2024-04-16 DIAGNOSIS — R74.8 ELEVATED ALKALINE PHOSPHATASE LEVEL: ICD-10-CM

## 2024-04-16 DIAGNOSIS — F17.210 SMOKING 1/2 PACK A DAY OR LESS: ICD-10-CM

## 2024-04-16 DIAGNOSIS — R07.82 INTERCOSTAL PAIN: ICD-10-CM

## 2024-04-16 DIAGNOSIS — I10 ESSENTIAL HYPERTENSION: ICD-10-CM

## 2024-04-16 DIAGNOSIS — E78.00 HYPERCHOLESTEROLEMIA: ICD-10-CM

## 2024-04-16 DIAGNOSIS — E11.9 TYPE 2 DIABETES MELLITUS WITHOUT COMPLICATION, WITHOUT LONG-TERM CURRENT USE OF INSULIN (HCC): ICD-10-CM

## 2024-04-16 DIAGNOSIS — E05.00 GRAVES DISEASE: Primary | ICD-10-CM

## 2024-04-16 DIAGNOSIS — G89.29 CHRONIC LOW BACK PAIN, UNSPECIFIED BACK PAIN LATERALITY, UNSPECIFIED WHETHER SCIATICA PRESENT: ICD-10-CM

## 2024-04-16 DIAGNOSIS — R10.11 COLICKY RUQ ABDOMINAL PAIN: ICD-10-CM

## 2024-04-16 DIAGNOSIS — M54.50 CHRONIC LOW BACK PAIN, UNSPECIFIED BACK PAIN LATERALITY, UNSPECIFIED WHETHER SCIATICA PRESENT: ICD-10-CM

## 2024-04-16 DIAGNOSIS — J44.9 CHRONIC OBSTRUCTIVE PULMONARY DISEASE, UNSPECIFIED COPD TYPE (HCC): ICD-10-CM

## 2024-04-16 DIAGNOSIS — R25.2 MUSCLE CRAMP, NOCTURNAL: ICD-10-CM

## 2024-04-16 PROCEDURE — 99214 OFFICE O/P EST MOD 30 MIN: CPT

## 2024-04-16 PROCEDURE — 3044F HG A1C LEVEL LT 7.0%: CPT

## 2024-04-16 PROCEDURE — 3074F SYST BP LT 130 MM HG: CPT

## 2024-04-16 PROCEDURE — 3023F SPIROM DOC REV: CPT

## 2024-04-16 PROCEDURE — 3017F COLORECTAL CA SCREEN DOC REV: CPT

## 2024-04-16 PROCEDURE — 3078F DIAST BP <80 MM HG: CPT

## 2024-04-16 PROCEDURE — G8417 CALC BMI ABV UP PARAM F/U: HCPCS

## 2024-04-16 PROCEDURE — G8427 DOCREV CUR MEDS BY ELIG CLIN: HCPCS

## 2024-04-16 PROCEDURE — 2022F DILAT RTA XM EVC RTNOPTHY: CPT

## 2024-04-16 PROCEDURE — 4004F PT TOBACCO SCREEN RCVD TLK: CPT

## 2024-04-16 RX ORDER — TIZANIDINE HYDROCHLORIDE 2 MG/1
2 CAPSULE, GELATIN COATED ORAL NIGHTLY PRN
Qty: 90 CAPSULE | Refills: 0 | Status: SHIPPED | OUTPATIENT
Start: 2024-04-16

## 2024-04-16 RX ORDER — BLOOD-GLUCOSE METER
1 KIT MISCELLANEOUS 3 TIMES DAILY
Qty: 350 EACH | Refills: 3 | Status: SHIPPED | OUTPATIENT
Start: 2024-04-16

## 2024-04-16 RX ORDER — VARENICLINE TARTRATE 0.5 MG/1
.5-1 TABLET, FILM COATED ORAL SEE ADMIN INSTRUCTIONS
Qty: 57 TABLET | Refills: 0 | Status: SHIPPED | OUTPATIENT
Start: 2024-04-16

## 2024-04-16 RX ORDER — AMLODIPINE BESYLATE 10 MG/1
10 TABLET ORAL DAILY
Qty: 90 TABLET | Refills: 1 | Status: SHIPPED | OUTPATIENT
Start: 2024-04-16

## 2024-04-22 ASSESSMENT — ENCOUNTER SYMPTOMS
COUGH: 0
CONSTIPATION: 0
TROUBLE SWALLOWING: 0
SINUS PAIN: 0
CHEST TIGHTNESS: 0
RHINORRHEA: 0
NAUSEA: 0
ABDOMINAL DISTENTION: 0
SORE THROAT: 0
DIARRHEA: 0
WHEEZING: 0
SINUS PRESSURE: 0
BLOOD IN STOOL: 0
VOICE CHANGE: 0
PHOTOPHOBIA: 0
SHORTNESS OF BREATH: 0
VOMITING: 0
ABDOMINAL PAIN: 1

## 2024-04-22 NOTE — PROGRESS NOTES
Medical Student Note    2024    Emmy Badillo (:  1960) is a 63 y.o. female, here for f/u of chronic medical conditions.     Patient c/o numbness in left arm with associated pain at the posterior left shoulder. She notes massaging area with complete resolution of symptom. She denies worsening weakness/tingling/numbness. No shooting pain down the left arm. No neck pain/stiffness. No trauma.    Chronic medical conditions:  HTN  Currently prescribed Amlodipine 10mg qday and Carvedilol 25mg qday.   Does not take BP's at home.     DM Type 2  She is currently taking Semaglutide. Patient's home blood glucose have ranged: 80s-114. Last A1c was 6.1 (24). She reports intermittent tremors, and bg's would be in the 80s and would improve after eating. He denies any episodes of lightheadedness, dizziness, syncope, nausea, or vomiting. He further denies any recent fever, chills, URI symptoms, SOB, chest pain, or abdominal pain.     COPD  Smokes 0.6-0.8ppd (down from 2ppd). Would like to re-trial Chantix. She took Chantix in the past, but reported severe nausea. She would like to retrial with nicotine patch use. Currently following pulmonology with next appointment on 24. Currently taking Trelogy ellipta 1 puff qday and albuterol prn. Has not taken albuterol in a while. Uses Home O2 when cleaning the house or moving around.     Muscle cramp/Lower back pain  Currently taking Zanaflex 2mg prn. Would like a refill. Denies worsening back pain, bowel/urinary incontinence, tingling/weakness/numbness in the legs.    HLD  ASCVD is 22%. Currently taking ezetimibe. Last lipid test (24) was significant for hypercholesterolemia (213) and elevated LDL (138) with normal TG's. Has taken Crestor & Lipitor in the past, but discontinued due to myalgias.      Patient Active Problem List   Diagnosis    Graves disease    Type 2 diabetes mellitus without complication (HCC)    Essential hypertension    Hyperlipidemia    
I saw and evaluated the patient, performing the key elements of the service.  I discussed the findings, assessment and plan with the resident and agree with the resident's findings and plan as documented in the resident's note. GC modifier added.  
present. No swelling or signs of injury.   Skin:     General: Skin is warm and dry.      Capillary Refill: Capillary refill takes less than 2 seconds.      Coloration: Skin is not jaundiced or pale.      Findings: No erythema or rash.   Neurological:      General: No focal deficit present.      Mental Status: She is alert and oriented to person, place, and time.      Cranial Nerves: No cranial nerve deficit.      Sensory: No sensory deficit.      Motor: No weakness.   Psychiatric:         Mood and Affect: Mood normal.         Behavior: Behavior normal.         Thought Content: Thought content normal.         Judgment: Judgment normal.                  An electronic signature was used to authenticate this note.    --Faisal Arnold MD

## 2024-05-07 ENCOUNTER — TELEPHONE (OUTPATIENT)
Dept: FAMILY MEDICINE CLINIC | Age: 64
End: 2024-05-07

## 2024-05-07 NOTE — TELEPHONE ENCOUNTER
Pt called asking if PCP could give her a call regarding Prior Auth. Pt was upset that P.A was denied due to clinical questions being answered per what was in her chart. Pt states she has an appointment tomorrow so call before 9:30 or after 11. Please advise.

## 2024-05-07 NOTE — TELEPHONE ENCOUNTER
Spoke to pt's insurance, they state that pt will need to try a higher dose of Atorvastatin (40mg)/Rosuvastatin (20mg) for at least 8 weeks before trying this medication, unless patient is statin intolerant, which would need to be documented. Repatha was denied due to not being statin intolerant.      Case # [id number]

## 2024-05-07 NOTE — TELEPHONE ENCOUNTER
Please send insurance notes from Jun/28/2023.  As documented in that note from encounter Jun/28/2023 in plan:    \"Restart zetia for HLD noted ascvd risk but did not tolerate statin.\"

## 2024-05-08 ENCOUNTER — HOSPITAL ENCOUNTER (OUTPATIENT)
Dept: ULTRASOUND IMAGING | Age: 64
Discharge: HOME | End: 2024-05-08
Payer: MEDICARE

## 2024-05-08 DIAGNOSIS — R74.8 ELEVATED ALKALINE PHOSPHATASE LEVEL: ICD-10-CM

## 2024-05-08 DIAGNOSIS — R10.11 COLICKY RUQ ABDOMINAL PAIN: ICD-10-CM

## 2024-05-08 DIAGNOSIS — R07.82 INTERCOSTAL PAIN: ICD-10-CM

## 2024-05-08 PROCEDURE — 76705 ECHO EXAM OF ABDOMEN: CPT

## 2024-05-08 NOTE — TELEPHONE ENCOUNTER
Called patients insurance to get the best number to send office notes for prior auth.     [employer] #: [phone]    Office notes from 06/28/23 sent via fax.

## 2024-05-13 ENCOUNTER — TELEPHONE (OUTPATIENT)
Dept: FAMILY MEDICINE CLINIC | Age: 64
End: 2024-05-13

## 2024-05-13 NOTE — TELEPHONE ENCOUNTER
----- Message from Faisal Arnold MD sent at 5/11/2024 11:49 AM EDT -----  Ultrasound reviewd,  Normal findings, liver and gallbladder appear normal.

## 2024-05-30 ENCOUNTER — HOSPITAL ENCOUNTER (OUTPATIENT)
Dept: CT IMAGING | Age: 64
Discharge: HOME | End: 2024-05-30
Attending: INTERNAL MEDICINE
Payer: MEDICARE

## 2024-05-30 DIAGNOSIS — Z87.891 PERSONAL HISTORY OF TOBACCO USE: ICD-10-CM

## 2024-05-30 PROCEDURE — 71271 CT THORAX LUNG CANCER SCR C-: CPT

## 2024-06-10 ENCOUNTER — NURSE ONLY (OUTPATIENT)
Dept: LAB | Age: 64
End: 2024-06-10

## 2024-06-10 ENCOUNTER — TELEPHONE (OUTPATIENT)
Dept: FAMILY MEDICINE CLINIC | Age: 64
End: 2024-06-10

## 2024-06-10 NOTE — TELEPHONE ENCOUNTER
Faisal Arnold MD  Emmy Badillo called and states that she went to get her labs drawn this morning and that they would not allow her to have them drawn due to the order has an Expected date as 07/16/2024. Emmy ELISA Badillo states that she is not going back to have the labs done before her appointment with you on 06/12/2024. Also she states that after her appointment with you on Wednesday she plans on finding another provider. Emmy Badillo states that she is done with [facility]. Please Advise.

## 2024-06-10 NOTE — TELEPHONE ENCOUNTER
Please apologize to patient,  Patient didn't need labs done until July as it was scheduled for 3 months out since starting new medication.  She does not need to get this lab done until after the visit at her leisure.  The visit with me in June was to check on her gallbladder, and make sure she was doing okay on the new medications.    Electronically signed by Faisal Arnold MD on 6/10/2024 at 1:16 PM

## 2024-06-12 ENCOUNTER — OFFICE VISIT (OUTPATIENT)
Dept: FAMILY MEDICINE CLINIC | Age: 64
End: 2024-06-12

## 2024-06-12 VITALS
OXYGEN SATURATION: 92 % | HEIGHT: 64 IN | TEMPERATURE: 98.7 F | WEIGHT: 186 LBS | DIASTOLIC BLOOD PRESSURE: 64 MMHG | RESPIRATION RATE: 16 BRPM | HEART RATE: 75 BPM | SYSTOLIC BLOOD PRESSURE: 104 MMHG | BODY MASS INDEX: 31.76 KG/M2

## 2024-06-12 DIAGNOSIS — M79.89 LEG SWELLING: ICD-10-CM

## 2024-06-12 DIAGNOSIS — R14.0 ABDOMINAL BLOATING: ICD-10-CM

## 2024-06-12 DIAGNOSIS — E05.00 GRAVES DISEASE: ICD-10-CM

## 2024-06-12 DIAGNOSIS — J96.11 CHRONIC RESPIRATORY FAILURE WITH HYPOXIA (HCC): ICD-10-CM

## 2024-06-12 DIAGNOSIS — J44.9 CHRONIC OBSTRUCTIVE PULMONARY DISEASE, UNSPECIFIED COPD TYPE (HCC): ICD-10-CM

## 2024-06-12 DIAGNOSIS — E11.9 TYPE 2 DIABETES MELLITUS WITHOUT COMPLICATION, WITHOUT LONG-TERM CURRENT USE OF INSULIN (HCC): Primary | ICD-10-CM

## 2024-06-12 DIAGNOSIS — E78.00 HYPERCHOLESTEROLEMIA: ICD-10-CM

## 2024-06-12 DIAGNOSIS — R06.02 SHORTNESS OF BREATH: ICD-10-CM

## 2024-06-12 SDOH — ECONOMIC STABILITY: FOOD INSECURITY: WITHIN THE PAST 12 MONTHS, THE FOOD YOU BOUGHT JUST DIDN'T LAST AND YOU DIDN'T HAVE MONEY TO GET MORE.: NEVER TRUE

## 2024-06-12 SDOH — ECONOMIC STABILITY: FOOD INSECURITY: WITHIN THE PAST 12 MONTHS, YOU WORRIED THAT YOUR FOOD WOULD RUN OUT BEFORE YOU GOT MONEY TO BUY MORE.: NEVER TRUE

## 2024-06-12 SDOH — ECONOMIC STABILITY: INCOME INSECURITY: HOW HARD IS IT FOR YOU TO PAY FOR THE VERY BASICS LIKE FOOD, HOUSING, MEDICAL CARE, AND HEATING?: NOT HARD AT ALL

## 2024-06-12 NOTE — TELEPHONE ENCOUNTER
Did speak to pt on 6-11-24 and pt did voice understanding. Stated that she would see the DrSaundra On 6-12-24. And then would make appropriate decision.

## 2024-06-13 ENCOUNTER — TELEPHONE (OUTPATIENT)
Dept: FAMILY MEDICINE CLINIC | Age: 64
End: 2024-06-13

## 2024-06-13 NOTE — TELEPHONE ENCOUNTER
Thank you so much for your work!  We can certainly try.  I tried calling at approx [phone], voicemail was full.  Staff can we try calling Patient later in day to see if she can get her insurance to fax prior auth to our office?  Please place in provider inbox for review.    Electronically signed by Faisal Arnold MD on 6/13/2024 at 11:49 AM

## 2024-06-13 NOTE — TELEPHONE ENCOUNTER
Dr. Arnold    I have talked to multiple agencies to get this patient's Repatha approved.     2 of the Medicare PBM's state they do not have this patient on file and the other one states it may be a week before the office account is created. Once the office account is created the patient might be listed with them or she may not.      I have exhausted all resources at this point.     I will try to access the new office account when I return to the office on 6/24.     If you would like to personally contact patient and see if she can get her insurance to fax the appropriate prior authorization forms to the office that might be of benefit.     Thank you!

## 2024-06-14 ENCOUNTER — HOSPITAL ENCOUNTER (OUTPATIENT)
Age: 64
Discharge: HOME | End: 2024-06-14
Payer: MEDICARE

## 2024-06-14 ENCOUNTER — HOSPITAL ENCOUNTER (OUTPATIENT)
Dept: GENERAL RADIOLOGY | Age: 64
Discharge: HOME | End: 2024-06-14
Payer: MEDICARE

## 2024-06-14 DIAGNOSIS — J44.9 CHRONIC OBSTRUCTIVE PULMONARY DISEASE, UNSPECIFIED COPD TYPE (HCC): ICD-10-CM

## 2024-06-14 DIAGNOSIS — M79.89 LEG SWELLING: ICD-10-CM

## 2024-06-14 DIAGNOSIS — J96.11 CHRONIC RESPIRATORY FAILURE WITH HYPOXIA (HCC): ICD-10-CM

## 2024-06-14 PROCEDURE — 71046 X-RAY EXAM CHEST 2 VIEWS: CPT

## 2024-06-17 ENCOUNTER — TELEPHONE (OUTPATIENT)
Dept: FAMILY MEDICINE CLINIC | Age: 64
End: 2024-06-17

## 2024-06-17 NOTE — TELEPHONE ENCOUNTER
----- Message from Faisal Arnold MD sent at 6/14/2024  6:10 PM EDT -----  Xray reviewd.  Apppears more COPD lung changes with lung scarring.  No gross evidence of fluid overload suggestive of heart failure or pneumonia.    Electronically signed by Faisal Arnold MD on 6/14/2024 at 6:10 PM

## 2024-06-17 NOTE — TELEPHONE ENCOUNTER
Dr. Arnold   Please note pt called and said you need to call   [facility] at   [phone]      Or at customer service at   [phone]

## 2024-06-19 ENCOUNTER — OFFICE VISIT (OUTPATIENT)
Dept: PULMONOLOGY | Age: 64
End: 2024-06-19
Payer: MEDICARE

## 2024-06-19 VITALS
WEIGHT: 187 LBS | HEIGHT: 64 IN | HEART RATE: 64 BPM | OXYGEN SATURATION: 91 % | TEMPERATURE: 97.5 F | BODY MASS INDEX: 31.92 KG/M2 | SYSTOLIC BLOOD PRESSURE: 120 MMHG | DIASTOLIC BLOOD PRESSURE: 76 MMHG

## 2024-06-19 DIAGNOSIS — F17.210 CIGARETTE SMOKER MOTIVATED TO QUIT: ICD-10-CM

## 2024-06-19 DIAGNOSIS — J44.9 COPD, SEVERE (HCC): Primary | ICD-10-CM

## 2024-06-19 DIAGNOSIS — J96.11 CHRONIC RESPIRATORY FAILURE WITH HYPOXIA (HCC): ICD-10-CM

## 2024-06-19 PROCEDURE — G8417 CALC BMI ABV UP PARAM F/U: HCPCS | Performed by: INTERNAL MEDICINE

## 2024-06-19 PROCEDURE — 99214 OFFICE O/P EST MOD 30 MIN: CPT | Performed by: INTERNAL MEDICINE

## 2024-06-19 PROCEDURE — 3078F DIAST BP <80 MM HG: CPT | Performed by: INTERNAL MEDICINE

## 2024-06-19 PROCEDURE — 99406 BEHAV CHNG SMOKING 3-10 MIN: CPT | Performed by: INTERNAL MEDICINE

## 2024-06-19 PROCEDURE — G8427 DOCREV CUR MEDS BY ELIG CLIN: HCPCS | Performed by: INTERNAL MEDICINE

## 2024-06-19 PROCEDURE — 3017F COLORECTAL CA SCREEN DOC REV: CPT | Performed by: INTERNAL MEDICINE

## 2024-06-19 PROCEDURE — 3074F SYST BP LT 130 MM HG: CPT | Performed by: INTERNAL MEDICINE

## 2024-06-19 PROCEDURE — 3023F SPIROM DOC REV: CPT | Performed by: INTERNAL MEDICINE

## 2024-06-19 PROCEDURE — 4004F PT TOBACCO SCREEN RCVD TLK: CPT | Performed by: INTERNAL MEDICINE

## 2024-06-19 RX ORDER — BUPROPION HYDROCHLORIDE 150 MG/1
150 TABLET ORAL EVERY MORNING
Qty: 30 TABLET | Refills: 0 | Status: SHIPPED | OUTPATIENT
Start: 2024-06-19

## 2024-06-19 ASSESSMENT — ENCOUNTER SYMPTOMS
CHEST TIGHTNESS: 0
SHORTNESS OF BREATH: 1
CHOKING: 0
ABDOMINAL DISTENTION: 0
COUGH: 1

## 2024-06-19 NOTE — PROGRESS NOTES
Neck Circumference - 16   Mallampati - 4    Lung Nodule Screening     [x] Qualifies    [] Does not qualify   [] Declined    [] Completed

## 2024-06-19 NOTE — PROGRESS NOTES
Emmy Badillo is a 64 y.o. female who presents today for:  Chief Complaint   Patient presents with    Follow-up     6 month follow up for COPD CT Chest 05/30/24         HPI:   Emmy Badillo is 64 y.o. who presents today as a f/u or COPD       Interval history    Unfortunately continues smoking however has hyporexia packs a day, she is upset about these she understands that she needs to quit, very symptomatic with wheezing and dyspnea for mild activities, nonproductive cough, has had bad reaction to Chantix willing to initiate bupropion and to be referred to the smoking cessation clinic.  Regimen includes Trelegy and albuterol    Screening CT scan of the chest completed on 6/14/2024 and read as RADS 2 with no suspicious lesions    6-minute walk completed today and unfortunately Emmy qualify for supplemental oxygen during activity    Previous notes  Follow-up on COPD, patient continues smoking with no motivation to quit average of a pack cigarettes a day, disciplined with her regimen to include Trelegy and albuterol, requires rescue inhaler about 3 times a week her breathing is at baseline able to do most activities of daily living comfortably, experiences a chronic \"smoker's cough\"  She is up-to-date in influenza and COVID-19 vaccines as well as a screening CT scan of the chest the last one completed on 5/30/2023.  Denies COPD exacerbations or visits to urgent care or emergency department      Emmy's condition remains stable, comes with a screening CT of the chest dated 5/30/2023 read as RADS 2 due to son stable benign looking lung nodules.  Unfortunately Emmy continues to smoking a little over half a pack cigarettes a day she is not motivated to stop, we have had that this conversation several times in the past today Emmy is agreeable to try a lower dose of nicotine replacement patches because she believes higher dose  constipates her.  Emmy is a  does not work outside the house, had a

## 2024-06-21 ENCOUNTER — TELEPHONE (OUTPATIENT)
Dept: FAMILY MEDICINE CLINIC | Age: 64
End: 2024-06-21

## 2024-06-21 NOTE — TELEPHONE ENCOUNTER
[facility] appeal department called with some clinical questions that needed to be answered. Would like the Doctor to give them a call to answer those questions.

## 2024-06-26 ENCOUNTER — TELEPHONE (OUTPATIENT)
Dept: PHARMACY | Age: 64
End: 2024-06-26

## 2024-06-26 NOTE — TELEPHONE ENCOUNTER
Referral to [facility] Medication Management Smoking Cessation Clinic received from Dr. Ko for Smoking Cessation Counseling and Medication Management per Consult Agreement.      I spoke with pt and explained our 12 week smoking cessation program and pt is interested in starting the program.  We set up patients first appointment for 7/17 at 1130am to be done in the office.  Pt notified to come to the Medication Management Department [facility].  Pt had no questions at this time.

## 2024-07-24 ENCOUNTER — OFFICE VISIT (OUTPATIENT)
Dept: FAMILY MEDICINE CLINIC | Age: 64
End: 2024-07-24

## 2024-07-24 VITALS
HEIGHT: 64 IN | HEART RATE: 72 BPM | DIASTOLIC BLOOD PRESSURE: 70 MMHG | WEIGHT: 184.2 LBS | BODY MASS INDEX: 31.45 KG/M2 | RESPIRATION RATE: 18 BRPM | TEMPERATURE: 98.1 F | SYSTOLIC BLOOD PRESSURE: 110 MMHG | OXYGEN SATURATION: 91 %

## 2024-07-24 DIAGNOSIS — I10 ESSENTIAL HYPERTENSION: Primary | ICD-10-CM

## 2024-07-24 DIAGNOSIS — F17.210 SMOKING 1/2 PACK A DAY OR LESS: ICD-10-CM

## 2024-07-24 DIAGNOSIS — M54.50 CHRONIC LOW BACK PAIN, UNSPECIFIED BACK PAIN LATERALITY, UNSPECIFIED WHETHER SCIATICA PRESENT: ICD-10-CM

## 2024-07-24 DIAGNOSIS — R25.2 MUSCLE CRAMP, NOCTURNAL: ICD-10-CM

## 2024-07-24 DIAGNOSIS — E78.00 HYPERCHOLESTEROLEMIA: ICD-10-CM

## 2024-07-24 DIAGNOSIS — G89.29 CHRONIC LOW BACK PAIN, UNSPECIFIED BACK PAIN LATERALITY, UNSPECIFIED WHETHER SCIATICA PRESENT: ICD-10-CM

## 2024-07-24 DIAGNOSIS — R74.8 ELEVATED ALKALINE PHOSPHATASE LEVEL: ICD-10-CM

## 2024-07-24 DIAGNOSIS — E11.9 TYPE 2 DIABETES MELLITUS WITHOUT COMPLICATION, WITHOUT LONG-TERM CURRENT USE OF INSULIN (HCC): ICD-10-CM

## 2024-07-24 DIAGNOSIS — J96.11 CHRONIC RESPIRATORY FAILURE WITH HYPOXIA (HCC): ICD-10-CM

## 2024-07-24 RX ORDER — AMLODIPINE BESYLATE 5 MG/1
5 TABLET ORAL DAILY
Qty: 90 TABLET | Refills: 0 | Status: SHIPPED | OUTPATIENT
Start: 2024-07-24 | End: 2024-10-22

## 2024-07-24 RX ORDER — LISINOPRIL 10 MG/1
10 TABLET ORAL DAILY
Qty: 90 TABLET | Refills: 0 | Status: SHIPPED | OUTPATIENT
Start: 2024-07-24 | End: 2024-10-22

## 2024-07-24 RX ORDER — TIZANIDINE HYDROCHLORIDE 2 MG/1
2 CAPSULE, GELATIN COATED ORAL NIGHTLY PRN
Qty: 90 CAPSULE | Refills: 0 | Status: SHIPPED | OUTPATIENT
Start: 2024-07-24

## 2024-07-24 NOTE — PROGRESS NOTES
appearance. She is obese. She is not ill-appearing.   HENT:      Head: Normocephalic and atraumatic.   Eyes:      General: No scleral icterus.     Conjunctiva/sclera: Conjunctivae normal.   Cardiovascular:      Rate and Rhythm: Normal rate and regular rhythm.      Pulses: Normal pulses.      Heart sounds: Normal heart sounds. No murmur heard.     No friction rub.   Pulmonary:      Effort: Pulmonary effort is normal. No respiratory distress.      Breath sounds: Normal breath sounds. No stridor. No wheezing or rhonchi.   Abdominal:      General: Bowel sounds are normal.      Palpations: Abdomen is soft.   Musculoskeletal:      Cervical back: Normal range of motion.      Right lower leg: Edema present.      Left lower leg: Edema present.   Skin:     General: Skin is warm.      Capillary Refill: Capillary refill takes less than 2 seconds.      Coloration: Skin is not jaundiced or pale.   Neurological:      General: No focal deficit present.      Mental Status: She is alert and oriented to person, place, and time. Mental status is at baseline.   Psychiatric:         Mood and Affect: Mood normal.         Behavior: Behavior normal.         Thought Content: Thought content normal.       Most Recent Data:  Lab Results   Component Value Date    WBC 10.6 04/06/2024    HGB 16.3 (H) 04/06/2024    HCT 48.9 (H) 04/06/2024     04/06/2024    CHOL 227 (H) 03/20/2023    TRIG 154 03/20/2023    HDL 47 04/06/2024    ALT 11 04/06/2024    AST 14 04/06/2024     04/06/2024     04/06/2024    K 4.3 04/06/2024    CREATININE 0.6 04/06/2024    BUN 10 04/06/2024    CO2 24 04/06/2024    TSH 2.590 04/06/2024    GLUF 93 04/06/2024    LABA1C 6.1 04/06/2024     XR CHEST (2 VW)  Narrative: PROCEDURE: XR CHEST (2 VW)    CLINICAL INFORMATION: Chronic respiratory failure with hypoxia; Chronic  obstructive pulmonary disease, unspecified; Other specified soft tissue  disorders    COMPARISON: 5/25/2022    TECHNIQUE: PA and lateral views of

## 2024-07-24 NOTE — PROGRESS NOTES
rest.  She has limited walking ability due to an orthopedic condition.  Duration of need: 5 years 1 each 0    albuterol sulfate HFA (PROAIR HFA) 108 (90 Base) MCG/ACT inhaler Inhale 2 puffs into the lungs every 6 hours as needed for Wheezing 1 each 11    Handicap Placard MISC by Does not apply route 1 each 0    carvedilol (COREG) 25 MG tablet Take 1 tablet by mouth 2 times daily (with meals) 180 tablet 1    gabapentin (NEURONTIN) 300 MG capsule Take 1 capsule by mouth in the morning and 1 capsule before bedtime. Do all this for 180 days. 180 capsule 1    glucose monitoring (FREESTYLE FREEDOM) kit 1 kit by Does not apply route daily 1 kit 0    blood glucose monitor kit and supplies Test 1 times a day & as needed for symptoms of irregular blood glucose. 1 kit 0    Evolocumab (REPATHA) SOSY syringe Inject 1 mL into the skin every 14 days (Patient not taking: Reported on 7/24/2024) 1 mL 3    neomycin-polymyxin-dexameth (MAXITROL) 3.5-86801-8.1 ophthalmic suspension  (Patient not taking: Reported on 7/24/2024)      ezetimibe (ZETIA) 10 MG tablet Take 1 tablet by mouth daily (Patient not taking: Reported on 7/24/2024) 90 tablet 1     No current facility-administered medications for this visit.       Review of Systems per resident physician    OBJECTIVE     /70 (Site: Left Upper Arm, Position: Sitting, Cuff Size: Medium Adult)   Pulse 72   Temp 98.1 °F (36.7 °C) (Oral)   Resp 18   Ht 1.626 m (5' 4\")   Wt 83.6 kg (184 lb 3.2 oz)   SpO2 91%   BMI 31.62 kg/m²   BP Readings from Last 3 Encounters:   07/24/24 110/70   06/19/24 120/76   06/12/24 104/64       Wt Readings from Last 3 Encounters:   07/24/24 83.6 kg (184 lb 3.2 oz)   06/19/24 84.8 kg (187 lb)   06/12/24 84.4 kg (186 lb)     Body mass index is 31.62 kg/m².    Physical Exam  Vitals and nursing note reviewed.   Constitutional:       General: She is not in acute distress.     Appearance: Normal appearance. She is not ill-appearing, toxic-appearing or

## 2024-07-25 ASSESSMENT — ENCOUNTER SYMPTOMS
COUGH: 0
ABDOMINAL PAIN: 0
SINUS PRESSURE: 0
CONSTIPATION: 0
SINUS PAIN: 0
SHORTNESS OF BREATH: 1
NAUSEA: 0

## 2024-07-29 ENCOUNTER — TELEPHONE (OUTPATIENT)
Dept: FAMILY MEDICINE CLINIC | Age: 64
End: 2024-07-29

## 2024-07-29 DIAGNOSIS — G89.29 CHRONIC LOW BACK PAIN, UNSPECIFIED BACK PAIN LATERALITY, UNSPECIFIED WHETHER SCIATICA PRESENT: ICD-10-CM

## 2024-07-29 DIAGNOSIS — R25.2 MUSCLE CRAMP, NOCTURNAL: ICD-10-CM

## 2024-07-29 DIAGNOSIS — I10 ESSENTIAL HYPERTENSION: ICD-10-CM

## 2024-07-29 DIAGNOSIS — M54.50 CHRONIC LOW BACK PAIN, UNSPECIFIED BACK PAIN LATERALITY, UNSPECIFIED WHETHER SCIATICA PRESENT: ICD-10-CM

## 2024-07-29 RX ORDER — AMLODIPINE BESYLATE 5 MG/1
5 TABLET ORAL DAILY
Qty: 90 TABLET | Refills: 0 | Status: SHIPPED | OUTPATIENT
Start: 2024-07-29 | End: 2024-10-27

## 2024-07-29 RX ORDER — TIZANIDINE HYDROCHLORIDE 2 MG/1
2 CAPSULE, GELATIN COATED ORAL NIGHTLY PRN
Qty: 90 CAPSULE | Refills: 0 | Status: SHIPPED | OUTPATIENT
Start: 2024-07-29

## 2024-07-29 RX ORDER — LISINOPRIL 10 MG/1
10 TABLET ORAL DAILY
Qty: 90 TABLET | Refills: 0 | Status: SHIPPED | OUTPATIENT
Start: 2024-07-29 | End: 2024-10-27

## 2024-07-29 NOTE — TELEPHONE ENCOUNTER
The patient had an appointment on 7/24/2024 and was prescribed Amlodipine, lisinopril, and tizanidine which was sent to [facility] on [address] but they are closed so it did not go through. Patient would like medication sent to [facility] in [place]

## 2024-07-29 NOTE — TELEPHONE ENCOUNTER
Medication sent to pharmacy.    An electronic signature was used to authenticate this note  - Shaan Dumas MD PGY-2 on 7/29/2024 at 2:49 PM

## 2024-09-18 ENCOUNTER — OFFICE VISIT (OUTPATIENT)
Age: 64
End: 2024-09-18
Payer: MEDICARE

## 2024-09-18 VITALS
HEART RATE: 69 BPM | HEIGHT: 64 IN | DIASTOLIC BLOOD PRESSURE: 64 MMHG | RESPIRATION RATE: 18 BRPM | SYSTOLIC BLOOD PRESSURE: 106 MMHG | BODY MASS INDEX: 31.82 KG/M2 | WEIGHT: 186.4 LBS

## 2024-09-18 DIAGNOSIS — E05.00 GRAVES DISEASE: ICD-10-CM

## 2024-09-18 DIAGNOSIS — E78.2 MIXED HYPERLIPIDEMIA: ICD-10-CM

## 2024-09-18 DIAGNOSIS — E11.65 TYPE 2 DIABETES MELLITUS WITH HYPERGLYCEMIA, WITHOUT LONG-TERM CURRENT USE OF INSULIN (HCC): Primary | ICD-10-CM

## 2024-09-18 PROCEDURE — G8417 CALC BMI ABV UP PARAM F/U: HCPCS | Performed by: INTERNAL MEDICINE

## 2024-09-18 PROCEDURE — 99214 OFFICE O/P EST MOD 30 MIN: CPT | Performed by: INTERNAL MEDICINE

## 2024-09-18 PROCEDURE — 2022F DILAT RTA XM EVC RTNOPTHY: CPT | Performed by: INTERNAL MEDICINE

## 2024-09-18 PROCEDURE — 4004F PT TOBACCO SCREEN RCVD TLK: CPT | Performed by: INTERNAL MEDICINE

## 2024-09-18 PROCEDURE — 3078F DIAST BP <80 MM HG: CPT | Performed by: INTERNAL MEDICINE

## 2024-09-18 PROCEDURE — 3044F HG A1C LEVEL LT 7.0%: CPT | Performed by: INTERNAL MEDICINE

## 2024-09-18 PROCEDURE — G8427 DOCREV CUR MEDS BY ELIG CLIN: HCPCS | Performed by: INTERNAL MEDICINE

## 2024-09-18 PROCEDURE — 3074F SYST BP LT 130 MM HG: CPT | Performed by: INTERNAL MEDICINE

## 2024-09-18 PROCEDURE — 3017F COLORECTAL CA SCREEN DOC REV: CPT | Performed by: INTERNAL MEDICINE

## 2024-09-18 RX ORDER — FLUTICASONE FUROATE, UMECLIDINIUM BROMIDE AND VILANTEROL TRIFENATATE 100; 62.5; 25 UG/1; UG/1; UG/1
POWDER RESPIRATORY (INHALATION)
COMMUNITY
Start: 2024-06-21

## 2024-09-19 ENCOUNTER — LAB (OUTPATIENT)
Dept: LAB | Age: 64
End: 2024-09-19

## 2024-09-19 DIAGNOSIS — E11.65 TYPE 2 DIABETES MELLITUS WITH HYPERGLYCEMIA, WITHOUT LONG-TERM CURRENT USE OF INSULIN (HCC): ICD-10-CM

## 2024-09-19 LAB
ALBUMIN SERPL BCG-MCNC: 4.1 G/DL (ref 3.5–5.1)
ALP SERPL-CCNC: 148 U/L (ref 38–126)
ALT SERPL W/O P-5'-P-CCNC: 11 U/L (ref 11–66)
ANION GAP SERPL CALC-SCNC: 11 MEQ/L (ref 8–16)
AST SERPL-CCNC: 14 U/L (ref 5–40)
BILIRUB SERPL-MCNC: 0.2 MG/DL (ref 0.3–1.2)
BUN SERPL-MCNC: 10 MG/DL (ref 7–22)
C PEPTIDE SERPL-MCNC: 3.7 NG/ML (ref 1.1–4.4)
CALCIUM SERPL-MCNC: 9.2 MG/DL (ref 8.5–10.5)
CHLORIDE SERPL-SCNC: 101 MEQ/L (ref 98–111)
CHOLEST SERPL-MCNC: 183 MG/DL (ref 100–199)
CO2 SERPL-SCNC: 27 MEQ/L (ref 23–33)
CREAT SERPL-MCNC: 0.6 MG/DL (ref 0.4–1.2)
DEPRECATED MEAN GLUCOSE BLD GHB EST-ACNC: 135 MG/DL (ref 70–126)
GFR SERPL CREATININE-BSD FRML MDRD: > 90 ML/MIN/1.73M2
GLUCOSE SERPL-MCNC: 101 MG/DL (ref 70–108)
HBA1C MFR BLD HPLC: 6.5 % (ref 4.4–6.4)
HDLC SERPL-MCNC: 41 MG/DL
LDLC SERPL CALC-MCNC: 124 MG/DL
POTASSIUM SERPL-SCNC: 4.1 MEQ/L (ref 3.5–5.2)
PROT SERPL-MCNC: 7.2 G/DL (ref 6.1–8)
SODIUM SERPL-SCNC: 139 MEQ/L (ref 135–145)
T4 FREE SERPL-MCNC: 1.22 NG/DL (ref 0.93–1.68)
TRIGL SERPL-MCNC: 91 MG/DL (ref 0–199)
TSH SERPL DL<=0.005 MIU/L-ACNC: 2.59 UIU/ML (ref 0.4–4.2)

## 2024-09-22 ENCOUNTER — CLINICAL DOCUMENTATION (OUTPATIENT)
Age: 64
End: 2024-09-22

## 2024-09-22 DIAGNOSIS — E11.65 TYPE 2 DIABETES MELLITUS WITH HYPERGLYCEMIA, WITHOUT LONG-TERM CURRENT USE OF INSULIN (HCC): Primary | ICD-10-CM

## 2024-09-22 LAB — TSI SER-ACNC: 0.9 IU/L

## 2024-09-23 ENCOUNTER — TELEPHONE (OUTPATIENT)
Age: 64
End: 2024-09-23

## 2024-09-25 ENCOUNTER — LAB (OUTPATIENT)
Dept: LAB | Age: 64
End: 2024-09-25

## 2024-09-25 DIAGNOSIS — E11.65 TYPE 2 DIABETES MELLITUS WITH HYPERGLYCEMIA, WITHOUT LONG-TERM CURRENT USE OF INSULIN (HCC): ICD-10-CM

## 2024-09-28 LAB — ALKALINE PHOSPHATASE ISOENZYMES: NORMAL

## 2024-10-08 ENCOUNTER — TELEPHONE (OUTPATIENT)
Age: 64
End: 2024-10-08

## 2024-10-10 ENCOUNTER — CLINICAL DOCUMENTATION (OUTPATIENT)
Age: 64
End: 2024-10-10

## 2024-10-10 DIAGNOSIS — R74.8 ELEVATED ALKALINE PHOSPHATASE LEVEL: Primary | ICD-10-CM

## 2024-10-15 ENCOUNTER — TELEPHONE (OUTPATIENT)
Age: 64
End: 2024-10-15

## 2024-10-15 ENCOUNTER — LAB (OUTPATIENT)
Dept: LAB | Age: 64
End: 2024-10-15

## 2024-10-15 DIAGNOSIS — R74.8 ELEVATED ALKALINE PHOSPHATASE LEVEL: ICD-10-CM

## 2024-10-15 DIAGNOSIS — E11.9 TYPE 2 DIABETES MELLITUS WITHOUT COMPLICATION, WITHOUT LONG-TERM CURRENT USE OF INSULIN (HCC): ICD-10-CM

## 2024-10-15 LAB
25(OH)D3 SERPL-MCNC: 26 NG/ML (ref 30–100)
CREAT UR-MCNC: 217.2 MG/DL
DEPRECATED MEAN GLUCOSE BLD GHB EST-ACNC: 123 MG/DL (ref 70–126)
GGT SERPL-CCNC: 24 U/L (ref 8–69)
HBA1C MFR BLD HPLC: 6.1 % (ref 4.4–6.4)
MICROALBUMIN UR-MCNC: 16.46 MG/DL
MICROALBUMIN/CREAT RATIO PNL UR: 76 MG/G (ref 0–30)

## 2024-10-15 NOTE — TELEPHONE ENCOUNTER
----- Message from Dr. Ej Castillo MD sent at 10/12/2024  2:30 PM EDT -----    Bone specific alkaline phosphatase is elevated.  Check vitamin D level.

## 2024-10-16 ENCOUNTER — TELEPHONE (OUTPATIENT)
Dept: FAMILY MEDICINE CLINIC | Age: 64
End: 2024-10-16

## 2024-10-16 NOTE — TELEPHONE ENCOUNTER
----- Message from Dr. Shaan Dumas MD sent at 10/16/2024  9:41 AM EDT -----  Increased protein in urine will discuss treatment options with patient on 10/30/24.

## 2024-10-19 ENCOUNTER — CLINICAL DOCUMENTATION (OUTPATIENT)
Age: 64
End: 2024-10-19

## 2024-10-21 ENCOUNTER — CLINICAL DOCUMENTATION (OUTPATIENT)
Age: 64
End: 2024-10-21

## 2024-10-21 ENCOUNTER — TELEPHONE (OUTPATIENT)
Age: 64
End: 2024-10-21

## 2024-10-21 NOTE — TELEPHONE ENCOUNTER
----- Message from Dr. Ej Castillo MD sent at 10/19/2024  9:44 PM EDT -----  Low vitamin D level.  Start over-the-counter vitamin D 1000 international units daily.  Keep existing appointment.

## 2024-10-21 NOTE — TELEPHONE ENCOUNTER
Patient states that vitamin D is hard on the kidneys and she thinks she has been having kidney problems. She states she states 250 mg 3x a week of vitmain D for the last 6 months. She does not know if she wants to add more vitamin D to this.

## 2024-10-22 ENCOUNTER — CLINICAL DOCUMENTATION (OUTPATIENT)
Age: 64
End: 2024-10-22

## 2024-10-22 DIAGNOSIS — E55.9 HYPOVITAMINOSIS D: Primary | ICD-10-CM

## 2024-10-30 ENCOUNTER — OFFICE VISIT (OUTPATIENT)
Dept: FAMILY MEDICINE CLINIC | Age: 64
End: 2024-10-30

## 2024-10-30 VITALS
HEART RATE: 72 BPM | WEIGHT: 187.2 LBS | RESPIRATION RATE: 12 BRPM | OXYGEN SATURATION: 91 % | BODY MASS INDEX: 31.96 KG/M2 | HEIGHT: 64 IN | SYSTOLIC BLOOD PRESSURE: 120 MMHG | DIASTOLIC BLOOD PRESSURE: 74 MMHG | TEMPERATURE: 97.6 F

## 2024-10-30 DIAGNOSIS — Z23 ENCOUNTER FOR VACCINATION: ICD-10-CM

## 2024-10-30 DIAGNOSIS — E11.9 TYPE 2 DIABETES MELLITUS WITHOUT COMPLICATION, WITHOUT LONG-TERM CURRENT USE OF INSULIN (HCC): Primary | ICD-10-CM

## 2024-10-30 DIAGNOSIS — G89.29 CHRONIC LOW BACK PAIN, UNSPECIFIED BACK PAIN LATERALITY, UNSPECIFIED WHETHER SCIATICA PRESENT: ICD-10-CM

## 2024-10-30 DIAGNOSIS — I10 ESSENTIAL HYPERTENSION: ICD-10-CM

## 2024-10-30 DIAGNOSIS — M54.50 CHRONIC LOW BACK PAIN, UNSPECIFIED BACK PAIN LATERALITY, UNSPECIFIED WHETHER SCIATICA PRESENT: ICD-10-CM

## 2024-10-30 DIAGNOSIS — R25.2 MUSCLE CRAMP, NOCTURNAL: ICD-10-CM

## 2024-10-30 RX ORDER — LISINOPRIL 10 MG/1
10 TABLET ORAL DAILY
Qty: 90 TABLET | Refills: 0 | Status: SHIPPED | OUTPATIENT
Start: 2024-10-30 | End: 2025-01-28

## 2024-10-30 RX ORDER — TIZANIDINE HYDROCHLORIDE 2 MG/1
2 CAPSULE, GELATIN COATED ORAL NIGHTLY PRN
Qty: 90 CAPSULE | Refills: 0 | Status: SHIPPED | OUTPATIENT
Start: 2024-10-30

## 2024-10-30 RX ORDER — AMLODIPINE BESYLATE 5 MG/1
5 TABLET ORAL DAILY
Qty: 90 TABLET | Refills: 0 | Status: SHIPPED | OUTPATIENT
Start: 2024-10-30 | End: 2025-01-28

## 2024-10-30 NOTE — PROGRESS NOTES
Immunizations Administered       Name Date Dose Route    COVID-19, PFIZER, (age 12y+), IM, 30mcg/0.3mL 10/30/2024 0.3 mL Intramuscular    Site: Deltoid- Left    Lot: [id number]    NDC: 0069-2432-10    Influenza, FLUCELVAX, (age 6 mo+) IM, Trivalent PF, 0.5mL 10/30/2024 0.5 mL Intramuscular    Site: Deltoid- Left    Lot: [id number]    NDC: 81049-874-53         For Pharmacy Admin Tracking Only    Program: Medical Group  CPA in place:  No  Recommendation Provided To: Provider: 2 via Verbally to provider  Intervention Detail: Vaccine Recommended/Administered  Intervention Accepted By: Provider: 2  Gap Closed?: Yes   Time Spent (min): 10

## 2024-10-30 NOTE — PROGRESS NOTES
Patient:Emmy Badillo  YOB: 1960   MRN:[id number]    Subjective   64 y.o. female who presents for the following: 3 Month Follow-Up (Review labs completed 10/15/2024/)    Patient is a 64-year-old female who is here for follow-up appointment and discuss labs.    Patient sees endocrinology, had elevated alk phos isoenzymes show it is from bone.  Endocrinology has adjusted to increase vitamin D supplementation.  Patient has been using increased vitamin D supplementation.  Has repeat labs due.    Essential hypertension with systolics in the 120s on amlodipine and lisinopril.    Hyperlipidemia => not on Repatha, intolerant to statins, is using Setia.    Still has chronic muscle cramps uses Zanaflex.  Is also saying that she has some proximal muscle weakness in her thighs.      Review of Systems  PMHx: She has a past medical history of COPD (chronic obstructive pulmonary disease) (HCC), Degenerative disc disease, Depression, Diverticulitis, Graves disease, Hyperlipidemia, Hypertension, Spinal stenosis, Type II or unspecified type diabetes mellitus without mention of complication, not stated as uncontrolled, and Vitamin D deficiency.    Objective     Vitals:    10/30/24 1508   BP: 120/74   Site: Left Upper Arm   Position: Sitting   Cuff Size: Medium Adult   Pulse: 72   Resp: 12   Temp: 97.6 °F (36.4 °C)   TempSrc: Oral   SpO2: 91%   Weight: 84.9 kg (187 lb 3.2 oz)   Height: 1.626 m (5' 4.02\")   Body mass index is 32.12 kg/m².    Physical Exam  Constitutional:       General: She is not in acute distress.     Appearance: Normal appearance. She is normal weight. She is not ill-appearing.   HENT:      Head: Normocephalic and atraumatic.   Cardiovascular:      Rate and Rhythm: Normal rate and regular rhythm.      Pulses: Normal pulses.      Heart sounds: Normal heart sounds. No murmur heard.     No friction rub.   Pulmonary:      Effort: Pulmonary effort is normal. No respiratory distress.      Breath sounds:

## 2024-11-01 NOTE — PROGRESS NOTES
Attending Physician Note    I have seen and evaluated the patient. I discussed the findings, assessment and plan with the resident and agree with the resident's findings and plan as documented in the resident's note.  GC modifier added.    Brief summary:  Proximal muscle pain, primarily BLE - check CRP, ESR as PMR a possible cause of her sx.    Continue current management of DM and HTN.

## 2024-11-11 ENCOUNTER — LAB (OUTPATIENT)
Dept: LAB | Age: 64
End: 2024-11-11

## 2024-11-11 DIAGNOSIS — E11.9 TYPE 2 DIABETES MELLITUS WITHOUT COMPLICATION, WITHOUT LONG-TERM CURRENT USE OF INSULIN (HCC): ICD-10-CM

## 2024-11-11 DIAGNOSIS — E55.9 HYPOVITAMINOSIS D: ICD-10-CM

## 2024-11-11 DIAGNOSIS — R25.2 MUSCLE CRAMP, NOCTURNAL: ICD-10-CM

## 2024-11-11 LAB
25(OH)D3 SERPL-MCNC: 26 NG/ML (ref 30–100)
ANION GAP SERPL CALC-SCNC: 12 MEQ/L (ref 8–16)
BUN SERPL-MCNC: 9 MG/DL (ref 7–22)
CALCIUM SERPL-MCNC: 9.3 MG/DL (ref 8.5–10.5)
CHLORIDE SERPL-SCNC: 102 MEQ/L (ref 98–111)
CO2 SERPL-SCNC: 27 MEQ/L (ref 23–33)
CREAT SERPL-MCNC: 0.6 MG/DL (ref 0.4–1.2)
CRP SERPL-MCNC: 0.81 MG/DL (ref 0–1)
ERYTHROCYTE [SEDIMENTATION RATE] IN BLOOD BY WESTERGREN METHOD: 8 MM/HR (ref 0–20)
GFR SERPL CREATININE-BSD FRML MDRD: > 90 ML/MIN/1.73M2
GLUCOSE SERPL-MCNC: 102 MG/DL (ref 70–108)
POTASSIUM SERPL-SCNC: 4.1 MEQ/L (ref 3.5–5.2)
SODIUM SERPL-SCNC: 141 MEQ/L (ref 135–145)

## 2024-11-12 ENCOUNTER — TELEPHONE (OUTPATIENT)
Dept: FAMILY MEDICINE CLINIC | Age: 64
End: 2024-11-12

## 2024-11-12 NOTE — TELEPHONE ENCOUNTER
----- Message from Dr. Shaan Dumas MD sent at 11/12/2024  7:38 AM EST -----  Electrolytes within normal limits  Stable renal function  Inflammatory markers CRP and sediment right within normal limits

## 2024-11-18 ENCOUNTER — TELEPHONE (OUTPATIENT)
Age: 64
End: 2024-11-18

## 2024-12-11 ENCOUNTER — OFFICE VISIT (OUTPATIENT)
Dept: PULMONOLOGY | Age: 64
End: 2024-12-11

## 2024-12-11 VITALS
OXYGEN SATURATION: 94 % | DIASTOLIC BLOOD PRESSURE: 80 MMHG | HEIGHT: 64 IN | WEIGHT: 187.2 LBS | HEART RATE: 74 BPM | BODY MASS INDEX: 31.96 KG/M2 | TEMPERATURE: 97.9 F | SYSTOLIC BLOOD PRESSURE: 124 MMHG

## 2024-12-11 DIAGNOSIS — F17.200 SMOKER UNMOTIVATED TO QUIT: ICD-10-CM

## 2024-12-11 DIAGNOSIS — D75.1 ERYTHROCYTOSIS DUE TO HYPOXEMIA: ICD-10-CM

## 2024-12-11 DIAGNOSIS — J44.9 COPD, SEVERE (HCC): ICD-10-CM

## 2024-12-11 DIAGNOSIS — J41.1 MUCOPURULENT CHRONIC BRONCHITIS (HCC): Primary | ICD-10-CM

## 2024-12-11 RX ORDER — FLUTICASONE FUROATE, UMECLIDINIUM BROMIDE AND VILANTEROL TRIFENATATE 100; 62.5; 25 UG/1; UG/1; UG/1
1 POWDER RESPIRATORY (INHALATION) DAILY
Qty: 180 EACH | Refills: 1 | Status: SHIPPED | OUTPATIENT
Start: 2024-12-11 | End: 2025-03-11

## 2024-12-11 ASSESSMENT — ENCOUNTER SYMPTOMS
CHEST TIGHTNESS: 0
ABDOMINAL DISTENTION: 0
COUGH: 1
CHOKING: 0
SHORTNESS OF BREATH: 1

## 2024-12-11 NOTE — PROGRESS NOTES
Neck Circumference -  15  Mallampati - 4    Lung Nodule Screening     [] Qualifies    [x] Does not qualify   [] Declined    [] Completed  
screening  3.  6 month CT recommended  4A.  3 month CT recommended; may consider PET/CT  4B.  Additional diagnostics and/or tissue sampling recommended  4X.  Additional diagnostics and/or tissue sampling recommended       **This report has been created using voice recognition software.  It may contain  minor errors which are inherent in voice recognition technology.**              Specimen Collected: 05/30/24 16:54 EDT             Spirometry:            Assessment / Plan:       Diagnosis Orders   1. Mucopurulent chronic bronchitis (HCC)        2. COPD, severe (HCC)  Spirometry With Bronchodilator      3. Smoker unmotivated to quit        4. Erythrocytosis due to hypoxemia          Stable right lower lobe nodule, likely histoplasma nodule, will continue monitoring, repeat CT chest in 6 months no need for biopsy at this time    Erythrocytosis secondary to smoking and hypoxemia, Emmy has been encouraged to use her supplemental oxygen as recommended with ambulation and to sleep, stop smoking    Continue Trelegy, albuterol, I discussed with Emmy that as long as she continues smoking his lung function will steadily deteriorate, smoked byproducts because airway irritation and chronic bronchitis.               Orders Placed This Encounter   Procedures    Spirometry With Bronchodilator     Standing Status:   Future     Standing Expiration Date:   12/11/2025     Scheduling Instructions:      Spirometry with bronchodilator response before clinic visit.      Orders Placed This Encounter   Medications    TRELEGY ELLIPTA 100-62.5-25 MCG/ACT AEPB inhaler     Sig: Inhale 1 puff into the lungs daily     Dispense:  180 each     Refill:  1

## 2024-12-29 NOTE — PROGRESS NOTES
Emmy Badillo is a 64 y.o. , female who comes for Follow up for Type 2 and Graves' disease  PCP: Shaan Dumas MD  The patient (or guardian, if applicable) and other individuals in attendance with the patient were advised that Artificial Intelligence will be utilized during this visit to record, process the conversation to generate a clinical note, and support improvement of the AI technology. The patient (or guardian, if applicable) and other individuals in attendance at the appointment consented to the use of AI, including the recording.      HPI   This patient was diagnosed with diabetes mellitus 11, years ago.   Current therapy includes Ozempic 2 mg weekly.  Her diet plan consists of portion control.  She is not getting much exercise at this point.  Blood sugars are not available.  Symptoms consist of urine frequency.  Otherwise she denies tingling and numbness of the feet.  No visual blurriness.   This patient has a history of hyperthyroidism but currently is not taking any medications.  Her symptoms include fatigue and frequent bowels.  She does not have any problems with the eyes.  For cholesterol this patient is taking Zetia.  She has had problems with statin therapy.  Her PCP has tried to get her on Repatha but insurance declined.  Data:  A1c was 6.1 on 10/15/2024  LDL was 124 on 9/19/2024  CMP was unremarkable 9/19/2024 with the exception of a slight elevation of alkaline phosphatase at 148 units/L    Past Medical History:   Diagnosis Date    COPD (chronic obstructive pulmonary disease) (HCC)     Degenerative disc disease 1996    Depression 07/2016    Diverticulitis     Graves disease 2011    Hyperlipidemia 2010    Hypertension 2011    Spinal stenosis 04/2015    Type II or unspecified type diabetes mellitus without mention of complication, not stated as uncontrolled 2014    Vitamin D deficiency 2012      Past Surgical History:   Procedure Laterality Date    CERVICAL SPINE SURGERY  1996

## 2024-12-30 ENCOUNTER — OFFICE VISIT (OUTPATIENT)
Age: 64
End: 2024-12-30
Payer: MEDICARE

## 2024-12-30 VITALS
DIASTOLIC BLOOD PRESSURE: 74 MMHG | WEIGHT: 189 LBS | HEART RATE: 77 BPM | HEIGHT: 64 IN | SYSTOLIC BLOOD PRESSURE: 120 MMHG | BODY MASS INDEX: 32.27 KG/M2

## 2024-12-30 DIAGNOSIS — I10 ESSENTIAL HYPERTENSION: ICD-10-CM

## 2024-12-30 DIAGNOSIS — E05.00 GRAVES DISEASE: ICD-10-CM

## 2024-12-30 DIAGNOSIS — E78.00 HYPERCHOLESTEROLEMIA: ICD-10-CM

## 2024-12-30 DIAGNOSIS — E11.9 TYPE 2 DIABETES MELLITUS WITHOUT COMPLICATION, WITHOUT LONG-TERM CURRENT USE OF INSULIN (HCC): Primary | ICD-10-CM

## 2024-12-30 PROCEDURE — 3078F DIAST BP <80 MM HG: CPT | Performed by: INTERNAL MEDICINE

## 2024-12-30 PROCEDURE — 3074F SYST BP LT 130 MM HG: CPT | Performed by: INTERNAL MEDICINE

## 2024-12-30 PROCEDURE — 99214 OFFICE O/P EST MOD 30 MIN: CPT | Performed by: INTERNAL MEDICINE

## 2024-12-30 PROCEDURE — G8417 CALC BMI ABV UP PARAM F/U: HCPCS | Performed by: INTERNAL MEDICINE

## 2024-12-30 PROCEDURE — 3017F COLORECTAL CA SCREEN DOC REV: CPT | Performed by: INTERNAL MEDICINE

## 2024-12-30 PROCEDURE — 2022F DILAT RTA XM EVC RTNOPTHY: CPT | Performed by: INTERNAL MEDICINE

## 2024-12-30 PROCEDURE — G8484 FLU IMMUNIZE NO ADMIN: HCPCS | Performed by: INTERNAL MEDICINE

## 2024-12-30 PROCEDURE — 4004F PT TOBACCO SCREEN RCVD TLK: CPT | Performed by: INTERNAL MEDICINE

## 2024-12-30 PROCEDURE — 3044F HG A1C LEVEL LT 7.0%: CPT | Performed by: INTERNAL MEDICINE

## 2024-12-30 PROCEDURE — G8427 DOCREV CUR MEDS BY ELIG CLIN: HCPCS | Performed by: INTERNAL MEDICINE

## 2024-12-30 RX ORDER — BLOOD-GLUCOSE METER
KIT MISCELLANEOUS
Qty: 1 EACH | Refills: 0 | Status: SHIPPED | OUTPATIENT
Start: 2024-12-30

## 2024-12-30 RX ORDER — LISINOPRIL 10 MG/1
10 TABLET ORAL DAILY
Qty: 90 TABLET | Refills: 0 | Status: CANCELLED | OUTPATIENT
Start: 2024-12-30 | End: 2025-03-30

## 2024-12-30 RX ORDER — EZETIMIBE 10 MG/1
10 TABLET ORAL DAILY
Qty: 90 TABLET | Refills: 1 | Status: CANCELLED | OUTPATIENT
Start: 2024-12-30

## 2025-01-29 ENCOUNTER — LAB (OUTPATIENT)
Dept: LAB | Age: 65
End: 2025-01-29

## 2025-01-29 DIAGNOSIS — E78.00 HYPERCHOLESTEROLEMIA: ICD-10-CM

## 2025-01-29 LAB
CHOLEST SERPL-MCNC: 179 MG/DL (ref 100–199)
HDLC SERPL-MCNC: 38 MG/DL
LDLC SERPL CALC-MCNC: 115 MG/DL
TRIGL SERPL-MCNC: 131 MG/DL (ref 0–199)

## 2025-01-31 ENCOUNTER — LAB (OUTPATIENT)
Dept: LAB | Age: 65
End: 2025-01-31

## 2025-01-31 ENCOUNTER — OFFICE VISIT (OUTPATIENT)
Dept: FAMILY MEDICINE CLINIC | Age: 65
End: 2025-01-31

## 2025-01-31 VITALS
RESPIRATION RATE: 20 BRPM | HEART RATE: 77 BPM | TEMPERATURE: 97.7 F | HEIGHT: 64 IN | DIASTOLIC BLOOD PRESSURE: 86 MMHG | BODY MASS INDEX: 31.82 KG/M2 | SYSTOLIC BLOOD PRESSURE: 136 MMHG | WEIGHT: 186.4 LBS | OXYGEN SATURATION: 94 %

## 2025-01-31 DIAGNOSIS — K92.1 BLOOD IN STOOL: ICD-10-CM

## 2025-01-31 DIAGNOSIS — E11.9 TYPE 2 DIABETES MELLITUS WITHOUT COMPLICATION, WITHOUT LONG-TERM CURRENT USE OF INSULIN (HCC): ICD-10-CM

## 2025-01-31 DIAGNOSIS — M54.50 CHRONIC LOW BACK PAIN, UNSPECIFIED BACK PAIN LATERALITY, UNSPECIFIED WHETHER SCIATICA PRESENT: Primary | ICD-10-CM

## 2025-01-31 DIAGNOSIS — G89.29 CHRONIC LOW BACK PAIN, UNSPECIFIED BACK PAIN LATERALITY, UNSPECIFIED WHETHER SCIATICA PRESENT: Primary | ICD-10-CM

## 2025-01-31 DIAGNOSIS — J44.9 CHRONIC OBSTRUCTIVE PULMONARY DISEASE, UNSPECIFIED COPD TYPE (HCC): ICD-10-CM

## 2025-01-31 DIAGNOSIS — I10 ESSENTIAL HYPERTENSION: ICD-10-CM

## 2025-01-31 LAB
CREAT UR-MCNC: 32.4 MG/DL
DEPRECATED RDW RBC AUTO: 42.7 FL (ref 35–45)
ERYTHROCYTE [DISTWIDTH] IN BLOOD BY AUTOMATED COUNT: 13.1 % (ref 11.5–14.5)
HCT VFR BLD AUTO: 51.8 % (ref 37–47)
HGB BLD-MCNC: 17.1 GM/DL (ref 12–16)
MCH RBC QN AUTO: 29.9 PG (ref 26–33)
MCHC RBC AUTO-ENTMCNC: 33 GM/DL (ref 32.2–35.5)
MCV RBC AUTO: 90.6 FL (ref 81–99)
MICROALBUMIN UR-MCNC: < 1.2 MG/DL
MICROALBUMIN/CREAT RATIO PNL UR: 37 MG/G (ref 0–30)
PLATELET # BLD AUTO: 292 THOU/MM3 (ref 130–400)
PMV BLD AUTO: 10.3 FL (ref 9.4–12.4)
RBC # BLD AUTO: 5.72 MILL/MM3 (ref 4.2–5.4)
WBC # BLD AUTO: 11 THOU/MM3 (ref 4.8–10.8)

## 2025-01-31 RX ORDER — AMLODIPINE BESYLATE 10 MG/1
10 TABLET ORAL DAILY
Qty: 90 TABLET | Refills: 0 | Status: SHIPPED | OUTPATIENT
Start: 2025-01-31 | End: 2025-05-01

## 2025-01-31 SDOH — ECONOMIC STABILITY: FOOD INSECURITY: WITHIN THE PAST 12 MONTHS, YOU WORRIED THAT YOUR FOOD WOULD RUN OUT BEFORE YOU GOT MONEY TO BUY MORE.: NEVER TRUE

## 2025-01-31 SDOH — ECONOMIC STABILITY: FOOD INSECURITY: WITHIN THE PAST 12 MONTHS, THE FOOD YOU BOUGHT JUST DIDN'T LAST AND YOU DIDN'T HAVE MONEY TO GET MORE.: NEVER TRUE

## 2025-01-31 ASSESSMENT — PATIENT HEALTH QUESTIONNAIRE - PHQ9
5. POOR APPETITE OR OVEREATING: NOT AT ALL
9. THOUGHTS THAT YOU WOULD BE BETTER OFF DEAD, OR OF HURTING YOURSELF: NOT AT ALL
SUM OF ALL RESPONSES TO PHQ9 QUESTIONS 1 & 2: 0
4. FEELING TIRED OR HAVING LITTLE ENERGY: NOT AT ALL
SUM OF ALL RESPONSES TO PHQ QUESTIONS 1-9: 0
7. TROUBLE CONCENTRATING ON THINGS, SUCH AS READING THE NEWSPAPER OR WATCHING TELEVISION: NOT AT ALL
6. FEELING BAD ABOUT YOURSELF - OR THAT YOU ARE A FAILURE OR HAVE LET YOURSELF OR YOUR FAMILY DOWN: NOT AT ALL
SUM OF ALL RESPONSES TO PHQ QUESTIONS 1-9: 0
2. FEELING DOWN, DEPRESSED OR HOPELESS: NOT AT ALL
SUM OF ALL RESPONSES TO PHQ QUESTIONS 1-9: 0
3. TROUBLE FALLING OR STAYING ASLEEP: NOT AT ALL
SUM OF ALL RESPONSES TO PHQ QUESTIONS 1-9: 0
1. LITTLE INTEREST OR PLEASURE IN DOING THINGS: NOT AT ALL
8. MOVING OR SPEAKING SO SLOWLY THAT OTHER PEOPLE COULD HAVE NOTICED. OR THE OPPOSITE, BEING SO FIGETY OR RESTLESS THAT YOU HAVE BEEN MOVING AROUND A LOT MORE THAN USUAL: NOT AT ALL

## 2025-01-31 NOTE — PROGRESS NOTES
Patient:Emmy Badillo  YOB: 1960   MRN:[id number]    Subjective   64 y.o. female who presents for the following: Follow-up (Patient in office today for 3 month DM2 follow up with pcp. Discuss ending Ozempic - /Not tolerating Lisinopril well - would like to go back to Amlodipine 10mg. /Swollen gland, sore throat R side. /Discuss sugars. )    HPI  Review of Systems    Wants to change lisinopril back to amlodipine 10mg  Wants to change ozempic back to the Jenuvia  Hx of c diff, feeling bloated constantly now  Blood in the stool Saturday; been going on for 4-5 months every now and then, this time it was the most blood she's seen. Has not happened since Saturday.  No n/v  Sore throat this morning  Dentist appt Feb 13th for for painful tooth  Handicap plaque for truck expires today, make it for more than1 year    PMHx: She has a past medical history of COPD (chronic obstructive pulmonary disease) (HCC), Degenerative disc disease, Depression, Diverticulitis, Graves disease, Hyperlipidemia, Hypertension, Spinal stenosis, Type II or unspecified type diabetes mellitus without mention of complication, not stated as uncontrolled, and Vitamin D deficiency.    Objective     Vitals:    01/31/25 1007   BP: 136/86   Site: Left Upper Arm   Position: Sitting   Cuff Size: Medium Adult   Pulse: 77   Resp: 20   Temp: 97.7 °F (36.5 °C)   TempSrc: Oral   SpO2: 94%   Weight: 84.6 kg (186 lb 6.4 oz)   Height: 1.626 m (5' 4\")   Body mass index is 32 kg/m².    Physical Exam  Most Recent Data:  Lab Results   Component Value Date    WBC 10.6 04/06/2024    HGB 16.3 (H) 04/06/2024    HCT 48.9 (H) 04/06/2024     04/06/2024    CHOL 179 01/29/2025    TRIG 131 01/29/2025    HDL 38 01/29/2025    ALT 11 09/19/2024    AST 14 09/19/2024     11/11/2024     11/11/2024    K 4.1 11/11/2024    CREATININE 0.6 11/11/2024    BUN 9 11/11/2024    CO2 27 11/11/2024    TSH 2.590 09/19/2024    GLUF 93 04/06/2024    LABA1C 6.1

## 2025-01-31 NOTE — PROGRESS NOTES
Patient:Emmy Badillo  YOB: 1960   MRN:[id number]    Subjective   64 y.o. female who presents for the following: Follow-up (Patient in office today for 3 month DM2 follow up with pcp. Discuss ending Ozempic - /Not tolerating Lisinopril well - would like to go back to Amlodipine 10mg. /Swollen gland, sore throat R side. /Discuss sugars. )    Patient is a 64-year-old female who presents to [facility] for 3-month follow-up on chronic medical conditions.    Type II DM following endocrinology, currently on Ozempic, tolerating Ozempic however not having adequate weight loss.  Patient states that she is not physically active secondary to back pain, patient is not interested in physical therapy at this time.  Patient was wondering if she could discontinue Ozempic and going to a different agent such as Januvia or tirzepatide.    History of Graves, is following endocrinology, not on Synthroid at this time, TSH reviewed and within limits.    History of hypertension, on carvedilol, on lisinopril, states that lisinopril is causing a lot of symptoms such as lightheadedness and headaches.  When symptoms does occur patient states that her blood pressure is in the 150s.  States that she has not had any amlodipine since last visit.  However amlodipine was picked up.  Unclear if patient is taking medication.  However patient would like to discontinue lisinopril due to side effects and resume amlodipine 10 mg.    Patient also complains of dental issue, right-sided swelling, right ear pain.  No abscess seen on physical examination, ears do not appear infected, patient will need close follow-up with dentist as scheduled.    Patient also has bleeding in her urine and stool, and declines physical examination at this time.  Of note patient does have a history of internal hemorrhoids.  Does use Preparation H occasionally.  Also states that she has MiraLAX.  However does not routinely use.  Does have bright red

## 2025-01-31 NOTE — PROGRESS NOTES
Attending Physician Note    I have seen and evaluated the patient. I discussed the findings, assessment and plan with the resident and agree with the resident's findings and plan as documented in the resident's note.  GC modifier added.    Brief summary:  HTN - not tolerating lisinopril.  Change to amlodipine 10mg daily.  Pt advised to watch for LE edema.  Cont carvedilol.  DM type 2 - change to tirzepatide.    Tooth pain, ear pain - ?dental issue vs sinusitis.  Trial fluticasone nasal spray.  Has appt with dentist.  If sx are worsening, cover with amoxicillin.    BRBPR - known internal hemorrhoids.  Continue OTC hemorrhoid treatment and work on softening stools/addressing constipation.  Check CBC.

## 2025-02-04 ENCOUNTER — TELEPHONE (OUTPATIENT)
Dept: FAMILY MEDICINE CLINIC | Age: 65
End: 2025-02-04

## 2025-02-04 NOTE — TELEPHONE ENCOUNTER
Yes this is the correct dosage, patient was on Ozempic 2 mg which is maximum dose therefore we will start her on a higher dose of Mounjaro as Ozempic was discontinued.  Thank you for your time.    An electronic signature was used to authenticate this note  - Shaan Dumas MD PGY-2 on 2/4/2025 at 3:06 PM

## 2025-02-04 NOTE — TELEPHONE ENCOUNTER
----- Message from Dr. Shaan Dumas MD sent at 2/1/2025  6:19 PM EST -----  Slightly elevated WBC  Elevated H&H, no evidence of acute blood loss.

## 2025-02-04 NOTE — TELEPHONE ENCOUNTER
Received call from [facility] wanting to verify that patient is supposed to be taking Tirzepatide 10 mg since this is her first time taking it. Please advise. Thank you      [facility]: [phone]

## 2025-02-05 ENCOUNTER — TELEPHONE (OUTPATIENT)
Age: 65
End: 2025-02-05

## 2025-02-05 NOTE — TELEPHONE ENCOUNTER
----- Message from Dr. Ej Castillo MD sent at 2/1/2025  4:37 PM EST -----  Urine microalbumin is better but not yet normal

## 2025-02-06 ENCOUNTER — TELEPHONE (OUTPATIENT)
Dept: FAMILY MEDICINE CLINIC | Age: 65
End: 2025-02-06

## 2025-02-06 NOTE — TELEPHONE ENCOUNTER
Received call from patient stating Dr. Castillo's office gave her results that she has questions about. Please see encounter from Dr. Castillo's office from 2/6/2025. Patient also states that she has always had high white blood cells, but no one has ever checked into it. Patient states she believes she has some kind of infection in her intestines, and she would like PCP to go back through her chart and review past labs. Patient also asked if she should see a kidney specialist. Please advise. Thank you!

## 2025-02-06 NOTE — TELEPHONE ENCOUNTER
----- Message from Tha DENTON sent at 2/6/2025  8:54 AM EST -----  Regarding: ECC Message to Provider  ECC Message to Provider    Relationship to Patient: Self     Additional Information  Patient was calling the office for 2 days in a raw now , but practice lotus noonan her call also patient mentioned that she have sent a message already but did not receive any respond from the office . For medication   --------------------------------------------------------------------------------------------------------------------------    Call Back Information: OK to leave message on voicemail  Preferred Call Back Number: Phone [phone]

## 2025-02-06 NOTE — TELEPHONE ENCOUNTER
Patient was informed and verbalized understanding, she wants you to know her \"Kidneys ache\" and that her white blood cells are elevated from recent blood work.

## 2025-02-06 NOTE — TELEPHONE ENCOUNTER
Attempted to call patient twice to discuss concerns and lab work.  Unable to leave detailed voicemail as patient has voicemail that is not set up.  Per chart review: Patient has proteinuria, was on lisinopril for blood pressure control however was discontinued as patient had intolerance to medication.  Per patient she requested to be on amlodipine for her blood pressure control on 1/31/2025. We can add Farxiga or a similar medication to her regimen for proteinuria.    Elevated WBC and H/H is likely secondary to patient's chronic history of smoking. I highly encourage patient to quit smoking.  I advise patient to make another appointment sooner to discuss her concerns.    An electronic signature was used to authenticate this note  - Shaan Dumas MD PGY-2 on 2/6/2025 at 3:44 PM

## 2025-02-17 ENCOUNTER — TELEPHONE (OUTPATIENT)
Dept: FAMILY MEDICINE CLINIC | Age: 65
End: 2025-02-17

## 2025-02-17 NOTE — TELEPHONE ENCOUNTER
Patient states Mounjaro is making her vomit and she cannot take it. She states the pharmacy is questioning the dose. Please advise

## 2025-02-17 NOTE — TELEPHONE ENCOUNTER
Patient appears to be on the 10 mg dose for Mounjaro, if she tolerated the 7.5 mg dose well, we can reduce her back down to that. Let me know if she would like this. I can also send in Zofran as well for the nausea and vomiting.     Electronically signed by Karely Hopkins DO on 2/17/2025 at 10:57 AM

## 2025-02-18 NOTE — TELEPHONE ENCOUNTER
Patient states this is the first time taking this medication and she wants to take the lowest dose she can because she is so sick. She is now afraid to take it. Please advise

## 2025-02-18 NOTE — TELEPHONE ENCOUNTER
I have re-sent in the 2.5 mg weekly dosing at this time. If she does not tolerate this, we will likely need to change medications.     Electronically signed by Karely Hopkins DO on 2/18/2025 at 12:04 PM

## 2025-02-26 ENCOUNTER — TELEPHONE (OUTPATIENT)
Dept: FAMILY MEDICINE CLINIC | Age: 65
End: 2025-02-26

## 2025-02-26 DIAGNOSIS — E11.9 TYPE 2 DIABETES MELLITUS WITHOUT COMPLICATION, WITHOUT LONG-TERM CURRENT USE OF INSULIN (HCC): Primary | ICD-10-CM

## 2025-02-27 NOTE — TELEPHONE ENCOUNTER
Due to intolerance of tirzepatide, we can go back to low-dose Ozempic and then slowly titrate up if she would prefer this option.    An electronic signature was used to authenticate this note  - Shaan Dumas MD PGY-2 on 2/27/2025 at 8:18 AM

## 2025-02-27 NOTE — TELEPHONE ENCOUNTER
Patient will be started on Ozempic 0.25 mg,  Patient should start medication 7 weeks apart from her last dose of Mounjaro.    An electronic signature was used to authenticate this note  - Shaan Dumas MD PGY-2 on 2/27/2025 at 10:57 AM

## 2025-04-08 ENCOUNTER — OFFICE VISIT (OUTPATIENT)
Dept: FAMILY MEDICINE CLINIC | Age: 65
End: 2025-04-08

## 2025-04-08 VITALS
TEMPERATURE: 98.2 F | DIASTOLIC BLOOD PRESSURE: 84 MMHG | HEIGHT: 64 IN | RESPIRATION RATE: 20 BRPM | SYSTOLIC BLOOD PRESSURE: 130 MMHG | BODY MASS INDEX: 30.7 KG/M2 | WEIGHT: 179.8 LBS | OXYGEN SATURATION: 94 % | HEART RATE: 75 BPM

## 2025-04-08 DIAGNOSIS — F41.9 ANXIETY: ICD-10-CM

## 2025-04-08 DIAGNOSIS — K92.1 BLOOD IN STOOL: ICD-10-CM

## 2025-04-08 DIAGNOSIS — K59.04 CHRONIC IDIOPATHIC CONSTIPATION: ICD-10-CM

## 2025-04-08 DIAGNOSIS — E11.9 TYPE 2 DIABETES MELLITUS WITHOUT COMPLICATION, WITHOUT LONG-TERM CURRENT USE OF INSULIN: ICD-10-CM

## 2025-04-08 DIAGNOSIS — J44.9 CHRONIC OBSTRUCTIVE PULMONARY DISEASE, UNSPECIFIED COPD TYPE (HCC): ICD-10-CM

## 2025-04-08 DIAGNOSIS — N18.32 CHRONIC KIDNEY DISEASE, STAGE 3B (HCC): ICD-10-CM

## 2025-04-08 DIAGNOSIS — I10 ESSENTIAL HYPERTENSION: Primary | ICD-10-CM

## 2025-04-08 DIAGNOSIS — R35.0 URINARY FREQUENCY: ICD-10-CM

## 2025-04-08 DIAGNOSIS — Z72.0 TOBACCO ABUSE: ICD-10-CM

## 2025-04-08 RX ORDER — BUPROPION HYDROCHLORIDE 100 MG/1
100 TABLET, EXTENDED RELEASE ORAL 2 TIMES DAILY
Qty: 60 TABLET | Refills: 1 | Status: SHIPPED | OUTPATIENT
Start: 2025-04-08

## 2025-04-08 RX ORDER — CARVEDILOL 25 MG/1
25 TABLET ORAL 2 TIMES DAILY WITH MEALS
Qty: 180 TABLET | Refills: 1 | Status: SHIPPED | OUTPATIENT
Start: 2025-04-08

## 2025-04-08 RX ORDER — AMLODIPINE BESYLATE 10 MG/1
10 TABLET ORAL DAILY
Qty: 90 TABLET | Refills: 0 | Status: SHIPPED | OUTPATIENT
Start: 2025-04-08 | End: 2025-07-07

## 2025-04-08 SDOH — ECONOMIC STABILITY: FOOD INSECURITY: WITHIN THE PAST 12 MONTHS, YOU WORRIED THAT YOUR FOOD WOULD RUN OUT BEFORE YOU GOT MONEY TO BUY MORE.: NEVER TRUE

## 2025-04-08 SDOH — ECONOMIC STABILITY: FOOD INSECURITY: WITHIN THE PAST 12 MONTHS, THE FOOD YOU BOUGHT JUST DIDN'T LAST AND YOU DIDN'T HAVE MONEY TO GET MORE.: NEVER TRUE

## 2025-04-08 NOTE — PROGRESS NOTES
Health Maintenance Due   Topic Date Due    Diabetic retinal exam  10/08/2016    Shingles vaccine (2 of 2) 02/03/2021    Respiratory Syncytial Virus (RSV) Pregnant or age 60 yrs+ (1 - Risk 60-74 years 1-dose series) Never done    Diabetic foot exam  11/30/2023    Annual Wellness Visit (Medicare)  01/15/2025       
S: 64 y.o. female with   Chief Complaint   Patient presents with    Follow-up     In office for 2 month DM follow up with pcp.      Dm ii -- stable, on ozempic (did not tolerate mounjaro)  -- no statins. On zetia.  Htn -- stable, on amlodipine  Grave's -- stable    Smoking continues. Interested in smoking cessation.    Kidney pain? Urinary frequency. H/o leakage urinary.    Blood in stool \"a lot\" sometimes.  but declines treatment for hemorrhoids. H/o hemorrhoids but declines treatment. Believes diverticulosis contributes to the bleeding. Feels pressure then bleeding will happen.  Not using miralax.  Colonoscopy 2022. S/p EDWARD.    -- we clarify that she has been having BM once a week, she empties on Friday and will have 6 BM in a short period of time.  She may have bleeding then, along with a sore bottom.   - she has tried fiber but appetite issues dont' allow her to eat it that much.    - tried miralax but doesn't work quickly.     Lab Results   Component Value Date    LABA1C 6.1 10/15/2024    LABA1C 6.5 (H) 09/19/2024    LABA1C 6.1 04/06/2024     BP Readings from Last 3 Encounters:   04/08/25 130/84   01/31/25 136/86   12/30/24 120/74     Wt Readings from Last 3 Encounters:   04/08/25 81.6 kg (179 lb 12.8 oz)   01/31/25 84.6 kg (186 lb 6.4 oz)   12/30/24 85.7 kg (189 lb)     Past Surgical History:   Procedure Laterality Date    CERVICAL SPINE SURGERY  1996    COLONOSCOPY  2014, 2018    [facility]    HYSTERECTOMY (CERVIX STATUS UNKNOWN)  1995    complete, bleeding    NECK SURGERY  1996    OTHER SURGICAL HISTORY  1 Dec 2015     Bilateral L4/5 Laminectomies, microdiskectomies (Dr. Catalan)       O: VS:   Vitals:    04/08/25 1021   BP: 130/84   BP Site: Left Upper Arm   Patient Position: Sitting   BP Cuff Size: Medium Adult   Pulse: 75   Resp: 20   Temp: 98.2 °F (36.8 °C)   TempSrc: Oral   SpO2: 94%   Weight: 81.6 kg (179 lb 12.8 oz)   Height: 1.626 m (5' 4\")     Body mass index is 30.86 kg/m².    AAO/NAD, appropriate 
follow-up with ophthalmology    Anxiety will start patient on Wellbutrin    Tobacco cessation will start patient on Wellbutrin and close follow-up.    Increased urinary frequency, states that she has kidney pain however not present at this time, unable to provide a UA is requesting urinalysis in the lab.  Placed orders    Blood in stool, known history of hemorrhoids, very constipated not consistent with fiber supplementation.  Discussed MiraLAX and milk of magnesia, discussed with patient to increase fiber intake as well and increase physical activity    Elevated hemoglobin/hematocrit will repeat CBC, follow-up on results and discuss need for further treatment.  Could be secondary to COPD/long history of smoking.  Or potential JAKE underlying    Return in about 3 months (around 7/8/2025) for Chronic medical conditions .      An electronic signature was used to authenticate this note  - Shaan Dumas MD PGY-2 on 4/8/2025 at 11:17 AM

## 2025-05-02 ENCOUNTER — LAB (OUTPATIENT)
Dept: LAB | Age: 65
End: 2025-05-02

## 2025-05-02 DIAGNOSIS — K92.1 BLOOD IN STOOL: ICD-10-CM

## 2025-05-02 DIAGNOSIS — E11.9 TYPE 2 DIABETES MELLITUS WITHOUT COMPLICATION, WITHOUT LONG-TERM CURRENT USE OF INSULIN (HCC): ICD-10-CM

## 2025-05-02 LAB
CREAT UR-MCNC: 98.2 MG/DL
DEPRECATED RDW RBC AUTO: 44.1 FL (ref 35–45)
ERYTHROCYTE [DISTWIDTH] IN BLOOD BY AUTOMATED COUNT: 13.2 % (ref 11.5–14.5)
HCT VFR BLD AUTO: 47.9 % (ref 37–47)
HGB BLD-MCNC: 15.7 GM/DL (ref 12–16)
MCH RBC QN AUTO: 29.7 PG (ref 26–33)
MCHC RBC AUTO-ENTMCNC: 32.8 GM/DL (ref 32.2–35.5)
MCV RBC AUTO: 90.7 FL (ref 81–99)
MICROALBUMIN UR-MCNC: < 2 MG/DL
MICROALBUMIN/CREAT RATIO PNL UR: 20 MG/G (ref 0–30)
PLATELET # BLD AUTO: 299 THOU/MM3 (ref 130–400)
PMV BLD AUTO: 9.5 FL (ref 9.4–12.4)
RBC # BLD AUTO: 5.28 MILL/MM3 (ref 4.2–5.4)
WBC # BLD AUTO: 10.9 THOU/MM3 (ref 4.8–10.8)

## 2025-05-03 ENCOUNTER — RESULTS FOLLOW-UP (OUTPATIENT)
Dept: FAMILY MEDICINE CLINIC | Age: 65
End: 2025-05-03

## 2025-05-06 NOTE — TELEPHONE ENCOUNTER
----- Message from Dr. Shaan Dumas MD sent at 5/3/2025  6:50 AM EDT -----  Improved WBC  Improved H&H  Improved albumin/creatinine ratio.

## 2025-05-19 ENCOUNTER — TELEPHONE (OUTPATIENT)
Dept: PULMONOLOGY | Age: 65
End: 2025-05-19

## 2025-07-10 ENCOUNTER — HOSPITAL ENCOUNTER (OUTPATIENT)
Dept: PULMONOLOGY | Age: 65
Discharge: HOME | End: 2025-07-10
Attending: INTERNAL MEDICINE
Payer: MEDICARE

## 2025-07-10 DIAGNOSIS — J44.9 COPD, SEVERE (HCC): ICD-10-CM

## 2025-07-10 PROCEDURE — 94060 EVALUATION OF WHEEZING: CPT

## 2025-07-16 NOTE — PROGRESS NOTES
[facility]    Patient: DAFNE QURESHI, 65 y.o.   : 1960      Patient of Dr. Ko     Assessment/Plan      Diagnosis Orders   1. Stage 3 severe COPD by GOLD classification ([facility])  albuterol sulfate HFA (PROAIR HFA) 108 (90 Base) MCG/ACT inhaler    Respiratory Therapy Supplies (NEBULIZER/TUBING/MOUTHPIECE) KIT    albuterol (PROVENTIL) (2.5 MG/3ML) 0.083% nebulizer solution      2. Mucopurulent chronic bronchitis ([facility])  Respiratory Therapy Supplies (NEBULIZER/TUBING/MOUTHPIECE) KIT    albuterol (PROVENTIL) (2.5 MG/3ML) 0.083% nebulizer solution      3. Smoker unmotivated to quit  Respiratory Therapy Supplies (NEBULIZER/TUBING/MOUTHPIECE) KIT    albuterol (PROVENTIL) (2.5 MG/3ML) 0.083% nebulizer solution      4. Chronic respiratory failure with hypoxia ([facility])  Respiratory Therapy Supplies (NEBULIZER/TUBING/MOUTHPIECE) KIT    albuterol (PROVENTIL) (2.5 MG/3ML) 0.083% nebulizer solution      5. Personal history of tobacco use  OH VISIT TO DISCUSS LUNG CA SCREEN W LDCT    CT Lung Screen (Annual)         -Continue Trelegy 100, 1 puff once daily  -Instructed to use Albuterol inhaler 2 puffs Q4h prn or Albuterol 2.5mg nebs Q4h prn (the nebulizer) at one time as rescue medication not both at the same time.   -Nebulizer with supplies and albuterol ordered today  -Smoking cessation highly encouraged  -Discussed with patient smoking cessation techniques including patches and gum patient reports has used in the past and did not help, reinforced to patient the importance of quitting smoking to prevent further damage to lung function, patient verbalized understanding. (Approximately 11 mins)   -If patient is able to wean down on tobacco use, could consider addition of Nucala or Dupixent to help with persistent symptoms as patient does have an elevated eosinophil count on previous CBC with differential  - Continue oxygen as previously ordered with 2 L/min with exertion.

## 2025-07-17 ENCOUNTER — OFFICE VISIT (OUTPATIENT)
Dept: PULMONOLOGY | Age: 65
End: 2025-07-17
Payer: MEDICARE

## 2025-07-17 VITALS
BODY MASS INDEX: 30.32 KG/M2 | SYSTOLIC BLOOD PRESSURE: 116 MMHG | OXYGEN SATURATION: 93 % | HEART RATE: 73 BPM | DIASTOLIC BLOOD PRESSURE: 68 MMHG | HEIGHT: 64 IN | WEIGHT: 177.6 LBS | TEMPERATURE: 98.4 F

## 2025-07-17 DIAGNOSIS — F17.200 SMOKER UNMOTIVATED TO QUIT: ICD-10-CM

## 2025-07-17 DIAGNOSIS — Z87.891 PERSONAL HISTORY OF TOBACCO USE: ICD-10-CM

## 2025-07-17 DIAGNOSIS — J44.9 STAGE 3 SEVERE COPD BY GOLD CLASSIFICATION (HCC): Primary | ICD-10-CM

## 2025-07-17 DIAGNOSIS — J41.1 MUCOPURULENT CHRONIC BRONCHITIS (HCC): ICD-10-CM

## 2025-07-17 DIAGNOSIS — J96.11 CHRONIC RESPIRATORY FAILURE WITH HYPOXIA (HCC): ICD-10-CM

## 2025-07-17 PROCEDURE — G0296 VISIT TO DETERM LDCT ELIG: HCPCS

## 2025-07-17 PROCEDURE — G8427 DOCREV CUR MEDS BY ELIG CLIN: HCPCS

## 2025-07-17 PROCEDURE — 3078F DIAST BP <80 MM HG: CPT

## 2025-07-17 PROCEDURE — 4004F PT TOBACCO SCREEN RCVD TLK: CPT

## 2025-07-17 PROCEDURE — G8417 CALC BMI ABV UP PARAM F/U: HCPCS

## 2025-07-17 PROCEDURE — 3074F SYST BP LT 130 MM HG: CPT

## 2025-07-17 PROCEDURE — 3023F SPIROM DOC REV: CPT

## 2025-07-17 PROCEDURE — 3017F COLORECTAL CA SCREEN DOC REV: CPT

## 2025-07-17 PROCEDURE — 99407 BEHAV CHNG SMOKING > 10 MIN: CPT

## 2025-07-17 PROCEDURE — 99214 OFFICE O/P EST MOD 30 MIN: CPT

## 2025-07-17 RX ORDER — ALBUTEROL SULFATE 0.83 MG/ML
2.5 SOLUTION RESPIRATORY (INHALATION) 4 TIMES DAILY PRN
Qty: 120 EACH | Refills: 3 | Status: SHIPPED | OUTPATIENT
Start: 2025-07-17

## 2025-07-17 RX ORDER — ALBUTEROL SULFATE 90 UG/1
2 INHALANT RESPIRATORY (INHALATION) EVERY 6 HOURS PRN
Qty: 1 EACH | Refills: 11 | Status: SHIPPED | OUTPATIENT
Start: 2025-07-17

## 2025-07-17 RX ORDER — NEBULIZER ACCESSORIES
1 KIT MISCELLANEOUS DAILY PRN
Qty: 1 KIT | Refills: 0 | Status: SHIPPED | OUTPATIENT
Start: 2025-07-17

## 2025-07-17 ASSESSMENT — ENCOUNTER SYMPTOMS
SHORTNESS OF BREATH: 1
SINUS PAIN: 0
CHEST TIGHTNESS: 0
SINUS PRESSURE: 0
COUGH: 1
WHEEZING: 1
RHINORRHEA: 0

## 2025-08-05 ENCOUNTER — OFFICE VISIT (OUTPATIENT)
Dept: FAMILY MEDICINE CLINIC | Age: 65
End: 2025-08-05

## 2025-08-05 VITALS
WEIGHT: 173.4 LBS | HEART RATE: 72 BPM | RESPIRATION RATE: 17 BRPM | DIASTOLIC BLOOD PRESSURE: 84 MMHG | BODY MASS INDEX: 29.6 KG/M2 | SYSTOLIC BLOOD PRESSURE: 128 MMHG | OXYGEN SATURATION: 94 % | HEIGHT: 64 IN

## 2025-08-05 DIAGNOSIS — E78.00 HYPERCHOLESTEROLEMIA: ICD-10-CM

## 2025-08-05 DIAGNOSIS — E05.00 GRAVES DISEASE: ICD-10-CM

## 2025-08-05 DIAGNOSIS — I10 ESSENTIAL HYPERTENSION: ICD-10-CM

## 2025-08-05 DIAGNOSIS — F41.9 ANXIETY: Primary | ICD-10-CM

## 2025-08-05 DIAGNOSIS — E11.9 TYPE 2 DIABETES MELLITUS WITHOUT COMPLICATION, WITHOUT LONG-TERM CURRENT USE OF INSULIN (HCC): ICD-10-CM

## 2025-08-05 RX ORDER — BUSPIRONE HYDROCHLORIDE 5 MG/1
5 TABLET ORAL 3 TIMES DAILY
Qty: 90 TABLET | Refills: 0 | Status: SHIPPED | OUTPATIENT
Start: 2025-08-05 | End: 2025-09-04

## 2025-08-05 RX ORDER — CARVEDILOL 25 MG/1
25 TABLET ORAL 2 TIMES DAILY WITH MEALS
Qty: 180 TABLET | Refills: 1 | Status: SHIPPED | OUTPATIENT
Start: 2025-08-05

## 2025-08-05 RX ORDER — AMLODIPINE BESYLATE 10 MG/1
10 TABLET ORAL DAILY
Qty: 90 TABLET | Refills: 1 | Status: SHIPPED | OUTPATIENT
Start: 2025-08-05 | End: 2026-02-01

## 2025-08-05 RX ORDER — EZETIMIBE 10 MG/1
10 TABLET ORAL DAILY
Qty: 90 TABLET | Refills: 1 | Status: SHIPPED | OUTPATIENT
Start: 2025-08-05

## 2025-09-06 ENCOUNTER — LAB (OUTPATIENT)
Dept: LAB | Age: 65
End: 2025-09-06

## 2025-09-06 DIAGNOSIS — I10 ESSENTIAL HYPERTENSION: ICD-10-CM

## 2025-09-06 DIAGNOSIS — E78.00 HYPERCHOLESTEROLEMIA: ICD-10-CM

## 2025-09-06 DIAGNOSIS — E11.9 TYPE 2 DIABETES MELLITUS WITHOUT COMPLICATION, WITHOUT LONG-TERM CURRENT USE OF INSULIN (HCC): ICD-10-CM

## 2025-09-06 DIAGNOSIS — E05.00 GRAVES DISEASE: ICD-10-CM

## 2025-09-06 LAB
ALBUMIN SERPL BCG-MCNC: 4.2 G/DL (ref 3.4–4.9)
ALP SERPL-CCNC: 115 U/L (ref 38–126)
ALT SERPL W/O P-5'-P-CCNC: 9 U/L (ref 10–35)
ANION GAP SERPL CALC-SCNC: 10 MEQ/L (ref 8–16)
AST SERPL-CCNC: 19 U/L (ref 10–35)
BILIRUB SERPL-MCNC: 0.3 MG/DL (ref 0.3–1.2)
BUN SERPL-MCNC: 7 MG/DL (ref 8–23)
CALCIUM SERPL-MCNC: 9.5 MG/DL (ref 8.5–10.5)
CHLORIDE SERPL-SCNC: 104 MEQ/L (ref 98–111)
CHOLESTEROL, FASTING: 196 MG/DL (ref 100–199)
CO2 SERPL-SCNC: 25 MEQ/L (ref 22–29)
CREAT SERPL-MCNC: 0.8 MG/DL (ref 0.5–0.9)
DEPRECATED MEAN GLUCOSE BLD GHB EST-ACNC: 123 MG/DL (ref 70–126)
DEPRECATED RDW RBC AUTO: 43.7 FL (ref 35–45)
ERYTHROCYTE [DISTWIDTH] IN BLOOD BY AUTOMATED COUNT: 12.8 % (ref 11.5–14.5)
GFR SERPL CREATININE-BSD FRML MDRD: 83 ML/MIN/1.73M2
GLUCOSE SERPL-MCNC: 98 MG/DL (ref 74–109)
HBA1C MFR BLD HPLC: 6.1 % (ref 4–6)
HCT VFR BLD AUTO: 52.6 % (ref 37–47)
HDLC SERPL-MCNC: 44 MG/DL
HGB BLD-MCNC: 16.8 GM/DL (ref 12–16)
LDLC SERPL CALC-MCNC: 127 MG/DL
MCH RBC QN AUTO: 29.9 PG (ref 26–33)
MCHC RBC AUTO-ENTMCNC: 31.9 GM/DL (ref 32.2–35.5)
MCV RBC AUTO: 93.6 FL (ref 81–99)
PLATELET # BLD AUTO: 296 THOU/MM3 (ref 130–400)
PMV BLD AUTO: 9.3 FL (ref 9.4–12.4)
POTASSIUM SERPL-SCNC: 4 MEQ/L (ref 3.5–5.2)
PROT SERPL-MCNC: 7.2 G/DL (ref 6.4–8.3)
RBC # BLD AUTO: 5.62 MILL/MM3 (ref 4.2–5.4)
SODIUM SERPL-SCNC: 139 MEQ/L (ref 135–145)
T4 FREE SERPL-MCNC: 1.2 NG/DL (ref 0.92–1.68)
TRIGLYCERIDE, FASTING: 124 MG/DL (ref 0–199)
TSH SERPL DL<=0.05 MIU/L-ACNC: 1.77 UIU/ML (ref 0.27–4.2)
WBC # BLD AUTO: 8.5 THOU/MM3 (ref 4.8–10.8)